# Patient Record
Sex: MALE | Race: WHITE | NOT HISPANIC OR LATINO | Employment: OTHER | URBAN - METROPOLITAN AREA
[De-identification: names, ages, dates, MRNs, and addresses within clinical notes are randomized per-mention and may not be internally consistent; named-entity substitution may affect disease eponyms.]

---

## 2017-01-10 ENCOUNTER — TELEPHONE (OUTPATIENT)
Dept: ORTHOPEDICS | Facility: CLINIC | Age: 77
End: 2017-01-10

## 2017-01-10 NOTE — TELEPHONE ENCOUNTER
----- Message from Pippa Ordonez sent at 1/10/2017  9:39 AM CST -----  Contact: self@ home   Pt is calling to verify his surgery time/ arrival.

## 2017-01-11 ENCOUNTER — SURGERY (OUTPATIENT)
Age: 77
End: 2017-01-11

## 2017-01-11 ENCOUNTER — ANESTHESIA (OUTPATIENT)
Dept: SURGERY | Facility: HOSPITAL | Age: 77
DRG: 470 | End: 2017-01-11
Payer: MEDICARE

## 2017-01-11 PROBLEM — M16.11 PRIMARY OSTEOARTHRITIS OF RIGHT HIP: Status: ACTIVE | Noted: 2017-01-11

## 2017-01-11 PROCEDURE — 27200688 HC TRAY, SPINAL-HYPER/ ISOBARIC: Performed by: ANESTHESIOLOGY

## 2017-01-11 PROCEDURE — D9220A PRA ANESTHESIA: Mod: CRNA,,, | Performed by: NURSE ANESTHETIST, CERTIFIED REGISTERED

## 2017-01-11 PROCEDURE — 25000003 PHARM REV CODE 250: Performed by: NURSE ANESTHETIST, CERTIFIED REGISTERED

## 2017-01-11 PROCEDURE — D9220A PRA ANESTHESIA: Mod: ANES,,, | Performed by: ANESTHESIOLOGY

## 2017-01-11 PROCEDURE — 27800517 HC TRAY,EPIDURAL-CONTINUOUS: Performed by: ANESTHESIOLOGY

## 2017-01-11 PROCEDURE — 27200710 HC EPIDURAL INFUSION PUMP SET: Performed by: ANESTHESIOLOGY

## 2017-01-11 PROCEDURE — 25000003 PHARM REV CODE 250: Performed by: NURSE PRACTITIONER

## 2017-01-11 PROCEDURE — 63600175 PHARM REV CODE 636 W HCPCS: Performed by: NURSE ANESTHETIST, CERTIFIED REGISTERED

## 2017-01-11 RX ORDER — LIDOCAINE HCL/PF 100 MG/5ML
SYRINGE (ML) INTRAVENOUS
Status: DISCONTINUED | OUTPATIENT
Start: 2017-01-11 | End: 2017-01-11

## 2017-01-11 RX ORDER — PHENYLEPHRINE HYDROCHLORIDE 10 MG/ML
INJECTION INTRAVENOUS
Status: DISCONTINUED | OUTPATIENT
Start: 2017-01-11 | End: 2017-01-11

## 2017-01-11 RX ORDER — FENTANYL CITRATE 50 UG/ML
INJECTION, SOLUTION INTRAMUSCULAR; INTRAVENOUS
Status: DISCONTINUED | OUTPATIENT
Start: 2017-01-11 | End: 2017-01-11

## 2017-01-11 RX ORDER — SODIUM CHLORIDE 9 MG/ML
INJECTION, SOLUTION INTRAVENOUS CONTINUOUS PRN
Status: DISCONTINUED | OUTPATIENT
Start: 2017-01-11 | End: 2017-01-11

## 2017-01-11 RX ORDER — DEXAMETHASONE SODIUM PHOSPHATE 4 MG/ML
INJECTION, SOLUTION INTRA-ARTICULAR; INTRALESIONAL; INTRAMUSCULAR; INTRAVENOUS; SOFT TISSUE
Status: DISCONTINUED | OUTPATIENT
Start: 2017-01-11 | End: 2017-01-11

## 2017-01-11 RX ORDER — MIDAZOLAM HYDROCHLORIDE 1 MG/ML
INJECTION, SOLUTION INTRAMUSCULAR; INTRAVENOUS
Status: DISCONTINUED | OUTPATIENT
Start: 2017-01-11 | End: 2017-01-11

## 2017-01-11 RX ORDER — PROPOFOL 10 MG/ML
VIAL (ML) INTRAVENOUS CONTINUOUS PRN
Status: DISCONTINUED | OUTPATIENT
Start: 2017-01-11 | End: 2017-01-11

## 2017-01-11 RX ORDER — KETAMINE HCL IN 0.9 % NACL 50 MG/5 ML
SYRINGE (ML) INTRAVENOUS
Status: DISCONTINUED | OUTPATIENT
Start: 2017-01-11 | End: 2017-01-11

## 2017-01-11 RX ADMIN — PROPOFOL 100 MCG/KG/MIN: 10 INJECTION, EMULSION INTRAVENOUS at 08:01

## 2017-01-11 RX ADMIN — SODIUM CHLORIDE, SODIUM ACETATE ANHYDROUS, SODIUM GLUCONATE, POTASSIUM CHLORIDE, AND MAGNESIUM CHLORIDE: 526; 222; 502; 37; 30 INJECTION, SOLUTION INTRAVENOUS at 09:01

## 2017-01-11 RX ADMIN — SODIUM CHLORIDE: 0.9 INJECTION, SOLUTION INTRAVENOUS at 07:01

## 2017-01-11 RX ADMIN — Medication 25 MG: at 08:01

## 2017-01-11 RX ADMIN — FENTANYL CITRATE 50 MCG: 50 INJECTION, SOLUTION INTRAMUSCULAR; INTRAVENOUS at 08:01

## 2017-01-11 RX ADMIN — DEXAMETHASONE SODIUM PHOSPHATE 8 MG: 4 INJECTION, SOLUTION INTRAMUSCULAR; INTRAVENOUS at 08:01

## 2017-01-11 RX ADMIN — TRANEXAMIC ACID 3000 MG: 100 INJECTION, SOLUTION INTRAVENOUS at 09:01

## 2017-01-11 RX ADMIN — EPHEDRINE SULFATE 5 MG: 50 INJECTION, SOLUTION INTRAMUSCULAR; INTRAVENOUS; SUBCUTANEOUS at 09:01

## 2017-01-11 RX ADMIN — EPHEDRINE SULFATE 10 MG: 50 INJECTION, SOLUTION INTRAMUSCULAR; INTRAVENOUS; SUBCUTANEOUS at 08:01

## 2017-01-11 RX ADMIN — MIDAZOLAM HYDROCHLORIDE 1 MG: 1 INJECTION, SOLUTION INTRAMUSCULAR; INTRAVENOUS at 07:01

## 2017-01-11 RX ADMIN — PHENYLEPHRINE HYDROCHLORIDE 200 MCG: 10 INJECTION INTRAVENOUS at 09:01

## 2017-01-11 RX ADMIN — Medication 2 G: at 08:01

## 2017-01-11 RX ADMIN — SODIUM CHLORIDE, SODIUM ACETATE ANHYDROUS, SODIUM GLUCONATE, POTASSIUM CHLORIDE, AND MAGNESIUM CHLORIDE: 526; 222; 502; 37; 30 INJECTION, SOLUTION INTRAVENOUS at 08:01

## 2017-01-11 RX ADMIN — EPHEDRINE SULFATE 25 MG: 50 INJECTION, SOLUTION INTRAMUSCULAR; INTRAVENOUS; SUBCUTANEOUS at 08:01

## 2017-01-11 RX ADMIN — PHENYLEPHRINE HYDROCHLORIDE 100 MCG: 10 INJECTION INTRAVENOUS at 09:01

## 2017-01-11 RX ADMIN — LIDOCAINE HYDROCHLORIDE 100 MG: 20 INJECTION, SOLUTION INTRAVENOUS at 08:01

## 2017-01-12 PROCEDURE — 01996 DLY HOSP MGMT EDRL RX ADMIN: CPT | Mod: GC,,, | Performed by: ANESTHESIOLOGY

## 2017-01-13 ENCOUNTER — TELEPHONE (OUTPATIENT)
Dept: ORTHOPEDICS | Facility: CLINIC | Age: 77
End: 2017-01-13

## 2017-01-13 NOTE — TELEPHONE ENCOUNTER
----- Message from Chelsea Patel LPN sent at 1/13/2017  1:51 PM CST -----  Contact: [facility] ([facility])      ----- Message -----     From: Singh Carmona     Sent: 1/13/2017  12:01 PM       To: Gar[facility] Londono Staff    [facility] called to request clarification on wound care orders. [phone]

## 2017-01-13 NOTE — TELEPHONE ENCOUNTER
Returned call to [facility]. Clarified orders for wound care. No need to change the dressing once it's been removed.

## 2017-01-16 ENCOUNTER — PATIENT OUTREACH (OUTPATIENT)
Dept: ADMINISTRATIVE | Facility: CLINIC | Age: 77
End: 2017-01-16
Payer: MEDICARE

## 2017-01-16 NOTE — PROGRESS NOTES
The patient does not have a scheduled HOSFU appointment within 7-14 days post hospital discharge date 1/12/17. Message sent to Physician staff to assist with HOSFU appointment scheduling.

## 2017-01-16 NOTE — PATIENT INSTRUCTIONS
Understanding Osteoarthritis of the Hip    A joint is a place where two bones meet. The hip is a ball-and-socket joint. It is formed where the ball at the top of the thighbone (femur) rests in the socket in the pelvic bone. The hip joint allows the leg to move freely in many directions.  Hip osteoarthritis is a condition where parts of the hip joint wear out. It can lead to pain, stiffness, and limited movement.   What is osteoarthritis?  All joints contain a smooth tissue called cartilage. Cartilage cushions the ends of bones, helping them glide against each other. Hip osteoarthritis occurs when cartilage in the hip joint begins to break down and wear away. The ball and socket bones may then become exposed and rub together. The cartilage may become rough and pitted and may begin to wear away. This prevents smooth movement of the joint and can lead to pain.  Causes of osteoarthritis of the hip  Causes can include:  · Wear and tear from normal use of the hip over time  · Overuse of the hip during sports or work activities  · Being overweight. This increases stress on the hip joint.  · Injury to the hip  · Infection of the hip joint  Symptoms of osteoarthritis of the hip  The main symptom of hip osteoarthritis is pain. The pain is most often felt in the groin area. It may also travel down the leg to the knee or to the back of the hip. The pain usually gets worse with activity, such as walking or climbing stairs. The pain may get better with rest. The joint may also be stiff first thing in the morning or after periods of sitting or lying down. Stiffness usually gets better with movement.  Treating osteoarthritis of the hip  Osteoarthritis is a long-term condition. Treatment usually focuses on managing symptoms. Treatment may include:  · Over-the-counter or prescription medicines taken by mouth to help relieve pain and swelling  · Injections of medicine into the joint to help relieve symptoms for a time  · A  weight-loss plan if you are overweight  · A plan of physical therapy and exercises to improve strength and flexibility around the joint  · Cold or heat packs help relieve pain and stiffness  · Assistive devices that help with movement, such as a cane or a walker  · Assistive devices that make activities of daily life easier, such as raised toilet seats or shower bars  If other treatments dont do enough to relieve severe symptoms, you may need surgery to replace the joint. This surgery replaces the hip joint with an artificial joint. It can help relieve pain and stiffness and improve function of the hip.     When to call your healthcare provider  Call your healthcare provider right away if you have any of these:  · Fever of 100.4°F (38°C) or higher, or as directed  · Symptoms that dont get better with prescribed medicines or get worse  · New symptoms   © 6194-9885 [employer]. [address] [place] [address], [place] [address]. All rights reserved. This information is not intended as a substitute for professional medical care. Always follow your healthcare professional's instructions.

## 2017-01-17 ENCOUNTER — TELEPHONE (OUTPATIENT)
Dept: ORTHOPEDICS | Facility: CLINIC | Age: 77
End: 2017-01-17

## 2017-01-17 NOTE — TELEPHONE ENCOUNTER
----- Message from Annel Garcia sent at 1/17/2017 11:27 AM CST -----  Contact: Pt can be reached at [phone]  Pt is calling to request appt that was accidentally cancelled for 1/20/17 if possible. Please contact pt for confirmation.      Thank you!

## 2017-01-17 NOTE — TELEPHONE ENCOUNTER
Spoke with pt and informed him that he did not need appt on 01/20, due to him having his post op appt on 01/26 with [facility].

## 2017-01-26 ENCOUNTER — HOSPITAL ENCOUNTER (OUTPATIENT)
Dept: RADIOLOGY | Facility: HOSPITAL | Age: 77
Discharge: HOME | End: 2017-01-26
Attending: NURSE PRACTITIONER
Payer: MEDICARE

## 2017-01-26 ENCOUNTER — OFFICE VISIT (OUTPATIENT)
Dept: ORTHOPEDICS | Facility: CLINIC | Age: 77
End: 2017-01-26
Payer: MEDICARE

## 2017-01-26 VITALS
WEIGHT: 214.94 LBS | TEMPERATURE: 97 F | BODY MASS INDEX: 32.58 KG/M2 | HEIGHT: 68 IN | HEART RATE: 70 BPM | SYSTOLIC BLOOD PRESSURE: 129 MMHG | DIASTOLIC BLOOD PRESSURE: 81 MMHG | RESPIRATION RATE: 19 BRPM

## 2017-01-26 DIAGNOSIS — Z96.641 S/P HIP REPLACEMENT, RIGHT: ICD-10-CM

## 2017-01-26 DIAGNOSIS — Z96.641 S/P HIP REPLACEMENT, RIGHT: Primary | ICD-10-CM

## 2017-01-26 PROCEDURE — 99024 POSTOP FOLLOW-UP VISIT: CPT | Mod: S$GLB,,, | Performed by: NURSE PRACTITIONER

## 2017-01-26 PROCEDURE — 99999 PR PBB SHADOW E&M-EST. PATIENT-LVL IV: CPT | Mod: PBBFAC,,, | Performed by: NURSE PRACTITIONER

## 2017-01-26 PROCEDURE — 73502 X-RAY EXAM HIP UNI 2-3 VIEWS: CPT | Mod: 26,RT,, | Performed by: RADIOLOGY

## 2017-01-26 PROCEDURE — 73502 X-RAY EXAM HIP UNI 2-3 VIEWS: CPT | Mod: TC,RT

## 2017-01-26 NOTE — PROGRESS NOTES
Jacksonborosunshine Camarillo presents for initial post-operative visit following a right total hip arthroplasty performed by Dr. Navarro on 1/11/2017.  Tolerating pain medication well.     Exam:  Patient is ambulating well with walker.   Incision is clean and dry without drainage or erythema.      Initial post-operative radiographs reviewed today revealing satisfactory position of the prosthesis.    A/P  2 weeks s/p right total hip replacement   - Patient advised to keep the incision clean and dry for the next 24 hours after which he may wash the area with antibacterial soap in the shower, but is advised not to submerge until the incision is completely healed.  - Continue aspirin for 1 month from surgery.  - Pain medication: no longer taking  - Follow up in 4 weeks with Dr. Navarro. Pt will call clinic immediately with problems/concerns.

## 2017-02-23 ENCOUNTER — OFFICE VISIT (OUTPATIENT)
Dept: ORTHOPEDICS | Facility: CLINIC | Age: 77
End: 2017-02-23
Payer: MEDICARE

## 2017-02-23 ENCOUNTER — HOSPITAL ENCOUNTER (OUTPATIENT)
Dept: RADIOLOGY | Facility: HOSPITAL | Age: 77
Discharge: HOME | End: 2017-02-23
Attending: ORTHOPAEDIC SURGERY
Payer: MEDICARE

## 2017-02-23 VITALS
WEIGHT: 212.19 LBS | RESPIRATION RATE: 18 BRPM | BODY MASS INDEX: 32.16 KG/M2 | HEART RATE: 69 BPM | TEMPERATURE: 97 F | SYSTOLIC BLOOD PRESSURE: 133 MMHG | DIASTOLIC BLOOD PRESSURE: 76 MMHG | HEIGHT: 68 IN

## 2017-02-23 DIAGNOSIS — M25.551 RIGHT HIP PAIN: ICD-10-CM

## 2017-02-23 DIAGNOSIS — M25.551 RIGHT HIP PAIN: Primary | ICD-10-CM

## 2017-02-23 PROCEDURE — 73502 X-RAY EXAM HIP UNI 2-3 VIEWS: CPT | Mod: TC,RT

## 2017-02-23 PROCEDURE — 73502 X-RAY EXAM HIP UNI 2-3 VIEWS: CPT | Mod: 26,RT,, | Performed by: RADIOLOGY

## 2017-02-23 PROCEDURE — 99999 PR PBB SHADOW E&M-EST. PATIENT-LVL III: CPT | Mod: PBBFAC,,, | Performed by: ORTHOPAEDIC SURGERY

## 2017-02-23 PROCEDURE — 99024 POSTOP FOLLOW-UP VISIT: CPT | Mod: S$GLB,,, | Performed by: ORTHOPAEDIC SURGERY

## 2017-02-23 RX ORDER — ASPIRIN 81 MG/1
81 TABLET ORAL DAILY
COMMUNITY
End: 2022-06-17 | Stop reason: HOSPADM

## 2017-02-23 NOTE — PROGRESS NOTES
Herber Camarillo presents for post-operative evaluation.  He is status-post  right Hip arthroplasty.  The wound is healing well with no signs of erythema or warmth.  There is no drainage.  No clinical signs or symptoms of infection are present.  .  ROM full.    Post-operative radiographs were obtained today and show satisfactory position of the prosthesis.    We will continue post-operative physical therapy.    Follow-up in 6 weeks.

## 2017-02-23 NOTE — MR AVS SNAPSHOT
[facility]  [address]  [facility] [address]  Phone: [phone]                  Herber Camarillo   2017 10:00 AM   Office Visit    Description:  Male : 1940   Provider:  Hany Navarro MD   Department:  [facility]           Reason for Visit     Post-op Evaluation           Diagnoses this Visit        Comments    Right hip pain    -  Primary            To Do List           Goals (5 Years of Data)     None      [facility]     [facility] -  Assistance  Registered nurses in the [facility] provide clinical advisement, health education, appointment booking, and other advisory services.  Call for this free service at [phone].             Medications           Message regarding Medications     Verify the changes and/or additions to your medication regime listed below are the same as discussed with your clinician today.  If any of these changes or additions are incorrect, please notify your healthcare provider.        STOP taking these medications     oxycodone-acetaminophen (PERCOCET)  mg per tablet Take 1 tablet by mouth every 4 (four) hours as needed.    oxycodone-acetaminophen (PERCOCET)  mg per tablet Take 1 tablet by mouth every 4 (four) hours as needed for Pain.    promethazine (PHENERGAN) 12.5 MG Tab Take 1 tablet (12.5 mg total) by mouth every 6 (six) hours as needed (Nausea).    docusate sodium (COLACE) 100 MG capsule Take 1 capsule (100 mg total) by mouth 2 (two) times daily as needed for Constipation.    diclofenac (VOLTAREN) 50 MG EC tablet Take 1 tablet (50 mg total) by mouth 2 (two) times daily as needed.           Verify that the below list of medications is an accurate representation of the medications you are currently taking.  If none reported, the list may be blank. If incorrect, please contact your healthcare provider. Carry this list with you in case of emergency.           Current  "Medications     allopurinol (ZYLOPRIM) 300 MG tablet Take 1 tablet (300 mg total) by mouth once daily.    aspirin (ECOTRIN) 81 MG EC tablet Take 81 mg by mouth once daily.    atorvastatin (LIPITOR) 20 MG tablet Take 1 tablet (20 mg total) by mouth once daily.    b complex vitamins tablet Take 1 tablet by mouth once daily.    metoprolol succinate (TOPROL-XL) 25 MG 24 hr tablet Take 1 tablet (25 mg total) by mouth once daily.    nitroGLYCERIN (NITROSTAT) 0.4 MG SL tablet Place 1 tablet (0.4 mg total) under the tongue every 5 (five) minutes as needed for Chest pain. ONE UNDER TONGUE FOR ANGINA MAY REPEAT IN 5 MIN IF IT STILL CONTINUES, CALL 911 AND TAKE AN ASPIRIN    omega-3 fatty acids-vitamin E (FISH OIL) 1,000 mg Cap Take by mouth.    naproxen sodium (ANAPROX) 220 MG tablet Take 220 mg by mouth 2 (two) times daily with meals.           Clinical Reference Information           Your Vitals Were     BP Pulse Temp Resp Height Weight    133/76 (BP Location: Right arm, Patient Position: Sitting, BP Method: Automatic) 69 97.1 °F (36.2 °C) (Oral) 18 5' 8" (1.727 m) 96.2 kg (212 lb 3.1 oz)    BMI                32.26 kg/m2          Blood Pressure          Most Recent Value    BP  133/76      Allergies as of 2/23/2017     Novocain [Procaine]      Immunizations Administered on Date of Encounter - 2/23/2017     None      Orders Placed During Today's Visit     Future Labs/Procedures Expected by Expires    X-Ray Hip 2 View Right  2/23/2017 2/23/2018      Language Assistance Services     ATTENTION: Language assistance services are available, free of charge. Please call [phone].      ATENCIÓN: Si habla bessiehetal, tiene a hackett disposición servicios gratuitos de asistencia lingüística. Llame al [phone].     GIGI Ý: N?u b?n nói Ti?ng Vi?t, có các d?ch v? h? tr? ngôn ng? mi?n phí dành cho b?n. G?i s? [phone].         [facility] complies with applicable Federal civil rights laws and does not discriminate on " the basis of race, color, national origin, age, disability, or sex.

## 2017-03-27 RX ORDER — ALLOPURINOL 300 MG/1
300 TABLET ORAL DAILY
Qty: 90 TABLET | Refills: 3 | Status: SHIPPED | OUTPATIENT
Start: 2017-03-27 | End: 2017-11-21 | Stop reason: SDUPTHER

## 2017-04-06 ENCOUNTER — OFFICE VISIT (OUTPATIENT)
Dept: ORTHOPEDICS | Facility: CLINIC | Age: 77
End: 2017-04-06
Payer: MEDICARE

## 2017-04-06 ENCOUNTER — HOSPITAL ENCOUNTER (OUTPATIENT)
Dept: RADIOLOGY | Facility: HOSPITAL | Age: 77
Discharge: HOME | End: 2017-04-06
Attending: ORTHOPAEDIC SURGERY
Payer: MEDICARE

## 2017-04-06 VITALS — HEIGHT: 68 IN | BODY MASS INDEX: 33.43 KG/M2 | WEIGHT: 220.56 LBS

## 2017-04-06 DIAGNOSIS — M25.551 RIGHT HIP PAIN: ICD-10-CM

## 2017-04-06 PROCEDURE — 73502 X-RAY EXAM HIP UNI 2-3 VIEWS: CPT | Mod: 26,RT,, | Performed by: RADIOLOGY

## 2017-04-06 PROCEDURE — 99999 PR PBB SHADOW E&M-EST. PATIENT-LVL II: CPT | Mod: PBBFAC,,, | Performed by: ORTHOPAEDIC SURGERY

## 2017-04-06 PROCEDURE — 73502 X-RAY EXAM HIP UNI 2-3 VIEWS: CPT | Mod: TC,RT

## 2017-04-06 PROCEDURE — 99024 POSTOP FOLLOW-UP VISIT: CPT | Mod: S$GLB,,, | Performed by: ORTHOPAEDIC SURGERY

## 2017-04-06 RX ORDER — AMOXICILLIN 500 MG/1
2000 TABLET, FILM COATED ORAL ONCE
Qty: 4 TABLET | Refills: 0 | Status: SHIPPED | OUTPATIENT
Start: 2017-04-06 | End: 2017-04-06

## 2017-04-06 NOTE — PROGRESS NOTES
Peters P Marquise presents for post-operative evaluation.  He is status-post  fritz.  The wound is healing well with no signs of erythema or warmth.  There is no drainage.  No clinical signs or symptoms of infection are present. .  ROM near normal. Getting better at tieing shoes.    Post-operative radiographs were obtained today and show satisfactory position of the prosthesis.    We will continue post-operative physical therapy.    Follow-up in 6 weeks.

## 2017-04-20 ENCOUNTER — OFFICE VISIT (OUTPATIENT)
Dept: OPTOMETRY | Facility: CLINIC | Age: 77
End: 2017-04-20
Payer: MEDICARE

## 2017-04-20 DIAGNOSIS — H02.834 DERMATOCHALASIS OF BOTH UPPER EYELIDS: ICD-10-CM

## 2017-04-20 DIAGNOSIS — H52.4 MYOPIA WITH PRESBYOPIA OF BOTH EYES: ICD-10-CM

## 2017-04-20 DIAGNOSIS — H52.13 MYOPIA WITH PRESBYOPIA OF BOTH EYES: ICD-10-CM

## 2017-04-20 DIAGNOSIS — H02.831 DERMATOCHALASIS OF BOTH UPPER EYELIDS: ICD-10-CM

## 2017-04-20 DIAGNOSIS — H25.13 NUCLEAR SCLEROSIS, BILATERAL: Primary | ICD-10-CM

## 2017-04-20 PROCEDURE — 92014 COMPRE OPH EXAM EST PT 1/>: CPT | Mod: S$GLB,,, | Performed by: OPTOMETRIST

## 2017-04-20 PROCEDURE — 99999 PR PBB SHADOW E&M-EST. PATIENT-LVL II: CPT | Mod: PBBFAC,,, | Performed by: OPTOMETRIST

## 2017-04-20 NOTE — PROGRESS NOTES
HPI     Last eye exam was 3/30/15 with Dr. Aguirre.  Patient states decrease in near vision with readers. Wanted to know if he   could use OTC readers vs rx readers. Didn't fill distance rx. Has trouble   keeping eyes open towards then end of the day-lids droopy.  Patient denies diplopia, headaches, flashes/floaters, and pain.         Last edited by Shayna Yanes on 4/20/2017 10:41 AM.     ROS     Positive for: Eyes    Negative for: Constitutional, Gastrointestinal, Neurological, Skin,   Genitourinary, Musculoskeletal, HENT, Endocrine, Cardiovascular,   Respiratory, Psychiatric, Allergic/Imm, Heme/Lymph    Last edited by Afia Aguirre, OD on 4/20/2017 11:52 AM. (History)        Assessment /Plan     For exam results, see Encounter Report.    Nuclear sclerosis, bilateral    Dermatochalasis of both upper eyelids    Myopia with presbyopia of both eyes            1.  Educated on cataracts and affects on vision.  Patient happy with vision.   2.  Patient reports lids bothersome towards the end of the day but does not want surgery.  Monitor.  3.  Educated pt ok to use +1.00 OTC readers.  Eye health normal OU.        RTC 1 year for routine exam.

## 2017-05-19 RX ORDER — METOPROLOL SUCCINATE 25 MG/1
25 TABLET, EXTENDED RELEASE ORAL DAILY
Qty: 90 TABLET | Refills: 3 | Status: SHIPPED | OUTPATIENT
Start: 2017-05-19 | End: 2018-10-05 | Stop reason: SDUPTHER

## 2017-05-23 ENCOUNTER — PATIENT MESSAGE (OUTPATIENT)
Dept: INTERNAL MEDICINE | Facility: CLINIC | Age: 77
End: 2017-05-23

## 2017-05-24 ENCOUNTER — TELEPHONE (OUTPATIENT)
Dept: INTERNAL MEDICINE | Facility: CLINIC | Age: 77
End: 2017-05-24

## 2017-05-25 ENCOUNTER — TELEPHONE (OUTPATIENT)
Dept: INTERNAL MEDICINE | Facility: CLINIC | Age: 77
End: 2017-05-25

## 2017-05-25 ENCOUNTER — PATIENT MESSAGE (OUTPATIENT)
Dept: INTERNAL MEDICINE | Facility: CLINIC | Age: 77
End: 2017-05-25

## 2017-06-02 ENCOUNTER — TELEPHONE (OUTPATIENT)
Dept: ORTHOPEDICS | Facility: CLINIC | Age: 77
End: 2017-06-02

## 2017-06-02 NOTE — TELEPHONE ENCOUNTER
Spoke with ShardaMarquise and informed him that since his sx was January he was passed the post op dian and that his appt that is scheduled on 6/26 is just a post sx f/u. Marquise verbalized understanding.

## 2017-06-02 NOTE — TELEPHONE ENCOUNTER
----- Message from Anna Jerome sent at 6/2/2017  1:26 PM CDT -----  Contact: self   Pt called to schedule an appt with the dr and he wanted to know if this would be a post op appt. His appt is scheduled for June 26th. Please call pt regarding this at [phone]

## 2017-06-26 ENCOUNTER — OFFICE VISIT (OUTPATIENT)
Dept: ORTHOPEDICS | Facility: CLINIC | Age: 77
End: 2017-06-26
Payer: MEDICARE

## 2017-06-26 VITALS — HEIGHT: 68 IN | WEIGHT: 213 LBS | BODY MASS INDEX: 32.28 KG/M2

## 2017-06-26 DIAGNOSIS — M25.551 BILATERAL HIP PAIN: Primary | ICD-10-CM

## 2017-06-26 DIAGNOSIS — M25.552 BILATERAL HIP PAIN: Primary | ICD-10-CM

## 2017-06-26 PROCEDURE — 99999 PR PBB SHADOW E&M-EST. PATIENT-LVL II: CPT | Mod: PBBFAC,,, | Performed by: ORTHOPAEDIC SURGERY

## 2017-06-26 PROCEDURE — 1159F MED LIST DOCD IN RCRD: CPT | Mod: S$GLB,,, | Performed by: ORTHOPAEDIC SURGERY

## 2017-06-26 PROCEDURE — 1125F AMNT PAIN NOTED PAIN PRSNT: CPT | Mod: S$GLB,,, | Performed by: ORTHOPAEDIC SURGERY

## 2017-06-26 PROCEDURE — 99212 OFFICE O/P EST SF 10 MIN: CPT | Mod: S$GLB,,, | Performed by: ORTHOPAEDIC SURGERY

## 2017-06-26 NOTE — PROGRESS NOTES
CC:   3 months out frpm fritz  HPI:  Doing well. Minimal pain. 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     PAST SURGICAL HISTORY:   Past Surgical History:   Procedure Laterality Date    CARDIAC CATHETERIZATION  01/2000    CHOLECYSTECTOMY      HERNIA REPAIR      HIP SURGERY Right 01/11/2017    THR    PROSTATE SURGERY      SHOULDER OPEN ROTATOR CUFF REPAIR      TONSILLECTOMY       FAMILY HISTORY:   Family History   Problem Relation Age of Onset    Heart disease Father     Cancer Sister      Lung    Heart disease Brother     Heart disease Brother     Heart disease Mother     Amblyopia Neg Hx     Blindness Neg Hx     Cataracts Neg Hx     Diabetes Neg Hx     Glaucoma Neg Hx     Hypertension Neg Hx     Macular degeneration Neg Hx     Retinal detachment Neg Hx     Strabismus Neg Hx     Stroke Neg Hx     Thyroid disease Neg Hx     Melanoma Neg Hx     Psoriasis Neg Hx     Lupus Neg Hx     Eczema Neg Hx     Acne Neg Hx     Asthma Neg Hx     Emphysema Neg Hx      SOCIAL HISTORY:   Social History     Social History    Marital status:      Spouse name: N/A    Number of children: N/A    Years of education: N/A     Occupational History    Retired Retired     Social History Main Topics    Smoking status: Never Smoker    Smokeless tobacco: Never Used    Alcohol use No    Drug use: No    Sexual activity: Not on file     Other Topics Concern    Not on file     Social History Narrative    Originally from [place]; 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 Herber Camarillo       MEDICATIONS:   Current Outpatient Prescriptions:     allopurinol (ZYLOPRIM) 300 MG tablet, Take 1 tablet (300 mg total) by mouth once daily., Disp: 90 tablet, Rfl: 3    aspirin (ECOTRIN) 81 MG EC tablet, Take 81 mg by mouth once daily., Disp: , Rfl:     atorvastatin (LIPITOR) 20 MG tablet, Take 1 tablet (20 mg total) by mouth once daily., Disp: 90 tablet, Rfl: 3    b complex vitamins tablet, Take 1 tablet by mouth once daily., Disp: , Rfl:     metoprolol succinate (TOPROL-XL) 25 MG 24 hr tablet, Take 1 tablet (25 mg total) by mouth once daily., Disp: 90 tablet, Rfl: 3    naproxen sodium (ANAPROX) 220 MG tablet, Take 220 mg by mouth 2 (two) times daily with meals., Disp: , Rfl:     nitroGLYCERIN (NITROSTAT) 0.4 MG SL tablet, Place 1 tablet (0.4 mg total) under the tongue every 5 (five) minutes as needed for Chest pain. ONE UNDER TONGUE FOR ANGINA MAY REPEAT IN 5 MIN IF IT STILL CONTINUES, CALL 911 AND TAKE AN ASPIRIN, Disp: 25 tablet, Rfl: 12    omega-3 fatty acids-vitamin E (FISH OIL) 1,000 mg Cap, Take by mouth., Disp: , Rfl:   ALLERGIES:   Review of patient's allergies indicates:   Allergen Reactions    Novocain [procaine] Other (See Comments)     Other reaction(s): shortnes of breath       VITAL SIGNS: Ht 5' 8" (1.727 m)   Wt 96.6 kg (213 lb)   BMI 32.39 kg/m²      Review of Systems   Constitution: Negative. Negative for chills, fever and night sweats.   HENT: Negative for congestion and headaches.    Eyes: Negative for blurred vision, left vision loss and right vision loss.   Cardiovascular: Negative for chest pain and syncope.   Respiratory: Negative for cough and shortness of breath.    Endocrine: Negative for polydipsia, polyphagia and polyuria.   Hematologic/Lymphatic: Negative for bleeding problem. Does not bruise/bleed easily.   Skin: Negative for dry skin, itching and rash.   Musculoskeletal: Negative for falls and muscle weakness. "   Gastrointestinal: Negative for abdominal pain and bowel incontinence.   Genitourinary: Negative for bladder incontinence and nocturia.   Neurological: Negative for disturbances in coordination, loss of balance and seizures.   Psychiatric/Behavioral: Negative for depression. The patient does not have insomnia.    Allergic/Immunologic: Negative for hives and persistent infections.       Physical Exam   Constitutional: Oriented to person, place, and time. Appears well-developed and well-nourished.   HENT:   Head: Normocephalic and atraumatic.   Nose: Nose normal.   Eyes: No scleral icterus.   Neck: Normal range of motion. Neck supple.   Cardiovascular: Normal rate and regular rhythm.    Pulses:       Radial pulses are 2+ on the right side, and 2+ on the left side.   Pulmonary/Chest: Effort normal and breath sounds normal.   Abdominal: Soft.   Neurological: Alert and oriented to person, place, and time.   Skin: Skin is warm.   Psychiatric: Normal mood and affect.     Incisions c/d/i  nvi    Xrays:  Of bilateral hips ordered and reviewed and my interpretation is as follows: He has a well-placed right-sided total hip replacement.  Signs of loosening.  Assessment:  No diagnosis found.    Plan:   continue weightbearing as tolerated.  Follow-up in 6 months.       No Follow-up on file.

## 2017-08-22 RX ORDER — ATORVASTATIN CALCIUM 20 MG/1
20 TABLET, FILM COATED ORAL DAILY
Qty: 90 TABLET | Refills: 3 | Status: SHIPPED | OUTPATIENT
Start: 2017-08-22 | End: 2018-05-15 | Stop reason: SDUPTHER

## 2017-09-15 ENCOUNTER — PATIENT MESSAGE (OUTPATIENT)
Dept: INTERNAL MEDICINE | Facility: CLINIC | Age: 77
End: 2017-09-15

## 2017-09-15 ENCOUNTER — OFFICE VISIT (OUTPATIENT)
Dept: CARDIOLOGY | Facility: CLINIC | Age: 77
End: 2017-09-15
Payer: MEDICARE

## 2017-09-15 VITALS
SYSTOLIC BLOOD PRESSURE: 144 MMHG | BODY MASS INDEX: 33.31 KG/M2 | HEIGHT: 68 IN | DIASTOLIC BLOOD PRESSURE: 71 MMHG | HEART RATE: 59 BPM | WEIGHT: 219.81 LBS

## 2017-09-15 DIAGNOSIS — Z00.00 PHYSICAL EXAM, ANNUAL: Primary | ICD-10-CM

## 2017-09-15 DIAGNOSIS — C61 PROSTATE CANCER: ICD-10-CM

## 2017-09-15 DIAGNOSIS — I25.10 CORONARY ARTERY DISEASE INVOLVING NATIVE CORONARY ARTERY OF NATIVE HEART WITHOUT ANGINA PECTORIS: Primary | ICD-10-CM

## 2017-09-15 DIAGNOSIS — R53.83 FATIGUE, UNSPECIFIED TYPE: ICD-10-CM

## 2017-09-15 DIAGNOSIS — E55.9 VITAMIN D DEFICIENCY DISEASE: ICD-10-CM

## 2017-09-15 DIAGNOSIS — E66.09 NON MORBID OBESITY DUE TO EXCESS CALORIES: ICD-10-CM

## 2017-09-15 DIAGNOSIS — E78.2 MIXED HYPERLIPIDEMIA: ICD-10-CM

## 2017-09-15 DIAGNOSIS — E79.89 URIC ACID CRYSTALLOPATHY: ICD-10-CM

## 2017-09-15 DIAGNOSIS — I70.0 ATHEROSCLEROSIS OF AORTA: ICD-10-CM

## 2017-09-15 DIAGNOSIS — R73.02 GLUCOSE INTOLERANCE (IMPAIRED GLUCOSE TOLERANCE): ICD-10-CM

## 2017-09-15 DIAGNOSIS — Z12.5 ENCOUNTER FOR SCREENING FOR MALIGNANT NEOPLASM OF PROSTATE: ICD-10-CM

## 2017-09-15 DIAGNOSIS — I51.89 DIASTOLIC DYSFUNCTION: ICD-10-CM

## 2017-09-15 PROCEDURE — 99499 UNLISTED E&M SERVICE: CPT | Mod: S$GLB,,, | Performed by: INTERNAL MEDICINE

## 2017-09-15 PROCEDURE — 99999 PR PBB SHADOW E&M-EST. PATIENT-LVL III: CPT | Mod: PBBFAC,,, | Performed by: INTERNAL MEDICINE

## 2017-09-15 PROCEDURE — 1126F AMNT PAIN NOTED NONE PRSNT: CPT | Mod: S$GLB,,, | Performed by: INTERNAL MEDICINE

## 2017-09-15 PROCEDURE — 3008F BODY MASS INDEX DOCD: CPT | Mod: S$GLB,,, | Performed by: INTERNAL MEDICINE

## 2017-09-15 PROCEDURE — 1159F MED LIST DOCD IN RCRD: CPT | Mod: S$GLB,,, | Performed by: INTERNAL MEDICINE

## 2017-09-15 PROCEDURE — 99214 OFFICE O/P EST MOD 30 MIN: CPT | Mod: S$GLB,,, | Performed by: INTERNAL MEDICINE

## 2017-09-15 NOTE — PROGRESS NOTES
Subjective:    Patient ID:  Herber Camarillo is a 77 y.o. male who presents for follow-up of Atherosclerosis of aorta and Coronary Artery Disease      HPI     The patient is a 77 year male followed by Dr Morin with CAD post NSTEME?PCI to LAD 2000. He is post R THR las January. He is now doing well and has no chest or WILKINS.  Lab Results   Component Value Date     01/11/2017    K 4.5 01/11/2017     01/11/2017    CO2 26 01/11/2017    BUN 11 01/11/2017    CREATININE 0.9 01/11/2017     01/11/2017    HGBA1C 5.1 12/16/2016    AST 28 12/16/2016    ALT 33 12/16/2016    ALBUMIN 3.8 12/16/2016    PROT 7.2 12/16/2016    BILITOT 1.2 (H) 12/16/2016    WBC 6.89 12/16/2016    WBC 6.89 12/16/2016    HGB 17.0 12/16/2016    HGB 17.0 12/16/2016    HCT 51.2 12/16/2016    HCT 51.2 12/16/2016    MCV 94 12/16/2016    MCV 94 12/16/2016     12/16/2016     12/16/2016    INR 1.0 12/16/2016    PSA 0.04 12/16/2016    TSH 1.987 12/16/2016         Lab Results   Component Value Date    CHOL 122 12/16/2016    HDL 39 (L) 12/16/2016    TRIG 118 12/16/2016       Lab Results   Component Value Date    LDLCALC 59.4 (L) 12/16/2016       Past Medical History:   Diagnosis Date    Arthritis     CAD (coronary artery disease) 10/30/2012    Cataract     Fever blister     Glucose intolerance (impaired glucose tolerance) 7/24/2013    Heart attack     Hyperlipidemia 10/30/2012    Joint pain     Keloid cicatrix     Lung nodule, solitary 4/30/2015    Non morbid obesity due to excess calories 10/20/2015    Obesity     Overweight(278.02) 9/5/2013    Prostate cancer     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       Current Outpatient Prescriptions:     allopurinol (ZYLOPRIM) 300 MG tablet, Take 1 tablet (300 mg total) by mouth once daily., Disp: 90 tablet, Rfl: 3    aspirin (ECOTRIN) 81 MG EC tablet, Take 81 mg by mouth once daily., Disp: , Rfl:     atorvastatin (LIPITOR) 20 MG tablet, Take 1 tablet (20 mg total) by mouth once  daily., Disp: 90 tablet, Rfl: 3    b complex vitamins tablet, Take 1 tablet by mouth once daily., Disp: , Rfl:     metoprolol succinate (TOPROL-XL) 25 MG 24 hr tablet, Take 1 tablet (25 mg total) by mouth once daily., Disp: 90 tablet, Rfl: 3    nitroGLYCERIN (NITROSTAT) 0.4 MG SL tablet, Place 1 tablet (0.4 mg total) under the tongue every 5 (five) minutes as needed for Chest pain. ONE UNDER TONGUE FOR ANGINA MAY REPEAT IN 5 MIN IF IT STILL CONTINUES, CALL 911 AND TAKE AN ASPIRIN, Disp: 25 tablet, Rfl: 12    omega-3 fatty acids-vitamin E (FISH OIL) 1,000 mg Cap, Take by mouth., Disp: , Rfl:         Review of Systems   Constitution: Negative for decreased appetite, diaphoresis, fever, weakness, malaise/fatigue, weight gain and weight loss.   HENT: Negative for congestion, ear discharge, ear pain and nosebleeds.    Eyes: Negative for blurred vision, double vision and visual disturbance.   Cardiovascular: Negative for chest pain, claudication, cyanosis, dyspnea on exertion, irregular heartbeat, leg swelling, near-syncope, orthopnea, palpitations, paroxysmal nocturnal dyspnea and syncope.   Respiratory: Negative for cough, hemoptysis, shortness of breath, sleep disturbances due to breathing, snoring, sputum production and wheezing.    Endocrine: Negative for polydipsia, polyphagia and polyuria.   Hematologic/Lymphatic: Negative for adenopathy and bleeding problem. Does not bruise/bleed easily.   Skin: Negative for color change, nail changes, poor wound healing and rash.   Musculoskeletal: Negative for muscle cramps and muscle weakness.   Gastrointestinal: Negative for abdominal pain, anorexia, change in bowel habit, hematochezia, nausea and vomiting.   Genitourinary: Negative for dysuria, frequency and hematuria.   Neurological: Negative for brief paralysis, difficulty with concentration, excessive daytime sleepiness, dizziness, focal weakness, headaches, light-headedness, seizures and vertigo.  "  Psychiatric/Behavioral: Negative for altered mental status and depression.   Allergic/Immunologic: Negative for persistent infections.        Objective:BP (!) 144/71 (BP Location: Left arm, Patient Position: Sitting, BP Method: X-Large (Automatic))   Pulse (!) 59   Ht 5' 8" (1.727 m)   Wt 99.7 kg (219 lb 12.8 oz)   BMI 33.42 kg/m²           Physical Exam   Constitutional: He is oriented to person, place, and time. He appears well-developed and well-nourished.   obese   HENT:   Head: Normocephalic.   Right Ear: External ear normal.   Left Ear: External ear normal.   Nose: Nose normal.   Inspection of lips, teeth and gums normal   Eyes: EOM are normal. Pupils are equal, round, and reactive to light. No scleral icterus.   Neck: Normal range of motion. Neck supple. No JVD present. No tracheal deviation present. No thyromegaly present.   Cardiovascular: Normal rate, regular rhythm and intact distal pulses.  Exam reveals no gallop and no friction rub.    No murmur heard.  Pulses:       Dorsalis pedis pulses are 2+ on the right side, and 2+ on the left side.        Posterior tibial pulses are 2+ on the right side, and 2+ on the left side.   Pulmonary/Chest: Effort normal and breath sounds normal.   Abdominal: Bowel sounds are normal. He exhibits no distension. There is no hepatosplenomegaly. There is no tenderness. There is no guarding.   Musculoskeletal: Normal range of motion. He exhibits no edema or tenderness.   Lymphadenopathy:   Palpation of neck and groin lymph nodes normal   Neurological: He is alert and oriented to person, place, and time. No cranial nerve deficit. He exhibits normal muscle tone. Coordination normal.   Skin: Skin is dry.   Palpation of skin normal   Psychiatric: His behavior is normal. Judgment and thought content normal.         Assessment:       1. Coronary artery disease involving native coronary artery of native heart without angina pectoris    2. Diastolic dysfunction    3. Mixed " hyperlipidemia    4. Atherosclerosis of aorta: see CT scan 7/15    5. Non morbid obesity due to excess calories         Plan:       Herber was seen today for atherosclerosis of aorta and coronary artery disease.    Diagnoses and all orders for this visit:    Coronary artery disease involving native coronary artery of native heart without angina pectoris  -     CBC auto differential; Future; Expected date: 09/15/2018  -     Lipid panel; Future; Expected date: 09/15/2018  -     Comprehensive metabolic panel; Future; Expected date: 09/15/2018    Diastolic dysfunction    Mixed hyperlipidemia  -     Lipid panel; Future; Expected date: 09/15/2018    Atherosclerosis of aorta: see CT scan 7/15    Non morbid obesity due to excess calories  -     Comprehensive metabolic panel; Future; Expected date: 09/15/2018

## 2017-11-21 RX ORDER — ALLOPURINOL 300 MG/1
TABLET ORAL
Qty: 90 TABLET | Refills: 3 | Status: SHIPPED | OUTPATIENT
Start: 2017-11-21 | End: 2018-09-14 | Stop reason: SDUPTHER

## 2017-12-04 ENCOUNTER — PATIENT MESSAGE (OUTPATIENT)
Dept: INTERNAL MEDICINE | Facility: CLINIC | Age: 77
End: 2017-12-04

## 2017-12-04 ENCOUNTER — LAB VISIT (OUTPATIENT)
Dept: LAB | Facility: HOSPITAL | Age: 77
End: 2017-12-04
Attending: INTERNAL MEDICINE
Payer: MEDICARE

## 2017-12-04 DIAGNOSIS — R53.83 FATIGUE, UNSPECIFIED TYPE: ICD-10-CM

## 2017-12-04 DIAGNOSIS — E55.9 VITAMIN D DEFICIENCY DISEASE: ICD-10-CM

## 2017-12-04 DIAGNOSIS — E78.2 MIXED HYPERLIPIDEMIA: ICD-10-CM

## 2017-12-04 DIAGNOSIS — Z12.5 ENCOUNTER FOR SCREENING FOR MALIGNANT NEOPLASM OF PROSTATE: ICD-10-CM

## 2017-12-04 DIAGNOSIS — R73.02 GLUCOSE INTOLERANCE (IMPAIRED GLUCOSE TOLERANCE): ICD-10-CM

## 2017-12-04 DIAGNOSIS — E79.89 URIC ACID CRYSTALLOPATHY: ICD-10-CM

## 2017-12-04 DIAGNOSIS — C61 PROSTATE CANCER: ICD-10-CM

## 2017-12-04 LAB
25(OH)D3+25(OH)D2 SERPL-MCNC: 24 NG/ML
ALBUMIN SERPL BCP-MCNC: 3.5 G/DL
ALP SERPL-CCNC: 94 U/L
ALT SERPL W/O P-5'-P-CCNC: 49 U/L
ANION GAP SERPL CALC-SCNC: 5 MMOL/L
AST SERPL-CCNC: 36 U/L
BASOPHILS # BLD AUTO: 0.05 K/UL
BASOPHILS NFR BLD: 0.7 %
BILIRUB SERPL-MCNC: 0.9 MG/DL
BUN SERPL-MCNC: 16 MG/DL
CALCIUM SERPL-MCNC: 9 MG/DL
CHLORIDE SERPL-SCNC: 106 MMOL/L
CHOLEST SERPL-MCNC: 137 MG/DL
CHOLEST/HDLC SERPL: 3.8 {RATIO}
CO2 SERPL-SCNC: 28 MMOL/L
COMPLEXED PSA SERPL-MCNC: 0.03 NG/ML
CREAT SERPL-MCNC: 1 MG/DL
DIFFERENTIAL METHOD: ABNORMAL
EOSINOPHIL # BLD AUTO: 0.1 K/UL
EOSINOPHIL NFR BLD: 1.9 %
ERYTHROCYTE [DISTWIDTH] IN BLOOD BY AUTOMATED COUNT: 12.2 %
EST. GFR  (AFRICAN AMERICAN): >60 ML/MIN/1.73 M^2
EST. GFR  (NON AFRICAN AMERICAN): >60 ML/MIN/1.73 M^2
ESTIMATED AVG GLUCOSE: 94 MG/DL
GLUCOSE SERPL-MCNC: 99 MG/DL
HBA1C MFR BLD HPLC: 4.9 %
HCT VFR BLD AUTO: 48.7 %
HDLC SERPL-MCNC: 36 MG/DL
HDLC SERPL: 26.3 %
HGB BLD-MCNC: 16.7 G/DL
IMM GRANULOCYTES # BLD AUTO: 0.04 K/UL
IMM GRANULOCYTES NFR BLD AUTO: 0.5 %
LDLC SERPL CALC-MCNC: 74.6 MG/DL
LYMPHOCYTES # BLD AUTO: 2.3 K/UL
LYMPHOCYTES NFR BLD: 30.3 %
MCH RBC QN AUTO: 31.8 PG
MCHC RBC AUTO-ENTMCNC: 34.3 G/DL
MCV RBC AUTO: 93 FL
MONOCYTES # BLD AUTO: 0.8 K/UL
MONOCYTES NFR BLD: 10.9 %
NEUTROPHILS # BLD AUTO: 4.2 K/UL
NEUTROPHILS NFR BLD: 55.7 %
NONHDLC SERPL-MCNC: 101 MG/DL
NRBC BLD-RTO: 0 /100 WBC
PLATELET # BLD AUTO: 207 K/UL
PMV BLD AUTO: 10.7 FL
POTASSIUM SERPL-SCNC: 4.3 MMOL/L
PROT SERPL-MCNC: 6.7 G/DL
RBC # BLD AUTO: 5.25 M/UL
SODIUM SERPL-SCNC: 139 MMOL/L
TRIGL SERPL-MCNC: 132 MG/DL
URATE SERPL-MCNC: 6.3 MG/DL
WBC # BLD AUTO: 7.55 K/UL

## 2017-12-04 PROCEDURE — 80053 COMPREHEN METABOLIC PANEL: CPT

## 2017-12-04 PROCEDURE — 82306 VITAMIN D 25 HYDROXY: CPT

## 2017-12-04 PROCEDURE — 80061 LIPID PANEL: CPT

## 2017-12-04 PROCEDURE — 36415 COLL VENOUS BLD VENIPUNCTURE: CPT

## 2017-12-04 PROCEDURE — 84153 ASSAY OF PSA TOTAL: CPT

## 2017-12-04 PROCEDURE — 84550 ASSAY OF BLOOD/URIC ACID: CPT

## 2017-12-04 PROCEDURE — 83036 HEMOGLOBIN GLYCOSYLATED A1C: CPT

## 2017-12-04 PROCEDURE — 85025 COMPLETE CBC W/AUTO DIFF WBC: CPT

## 2017-12-11 ENCOUNTER — PATIENT MESSAGE (OUTPATIENT)
Dept: INTERNAL MEDICINE | Facility: CLINIC | Age: 77
End: 2017-12-11

## 2017-12-11 ENCOUNTER — HOSPITAL ENCOUNTER (OUTPATIENT)
Dept: RADIOLOGY | Facility: HOSPITAL | Age: 77
Discharge: HOME | End: 2017-12-11
Attending: INTERNAL MEDICINE
Payer: MEDICARE

## 2017-12-11 ENCOUNTER — OFFICE VISIT (OUTPATIENT)
Dept: INTERNAL MEDICINE | Facility: CLINIC | Age: 77
End: 2017-12-11
Payer: MEDICARE

## 2017-12-11 VITALS
HEART RATE: 62 BPM | SYSTOLIC BLOOD PRESSURE: 125 MMHG | HEIGHT: 68 IN | BODY MASS INDEX: 33.58 KG/M2 | DIASTOLIC BLOOD PRESSURE: 70 MMHG | OXYGEN SATURATION: 97 % | WEIGHT: 221.56 LBS

## 2017-12-11 DIAGNOSIS — E78.2 MIXED HYPERLIPIDEMIA: ICD-10-CM

## 2017-12-11 DIAGNOSIS — R10.9 RIGHT FLANK PAIN: ICD-10-CM

## 2017-12-11 DIAGNOSIS — M16.11 PRIMARY OSTEOARTHRITIS OF RIGHT HIP: ICD-10-CM

## 2017-12-11 DIAGNOSIS — I70.0 ATHEROSCLEROSIS OF AORTA: ICD-10-CM

## 2017-12-11 DIAGNOSIS — R91.1 LUNG NODULE, SOLITARY: ICD-10-CM

## 2017-12-11 DIAGNOSIS — R74.8 ELEVATED LIVER ENZYMES: ICD-10-CM

## 2017-12-11 DIAGNOSIS — R07.81 PLEURITIC CHEST PAIN: ICD-10-CM

## 2017-12-11 DIAGNOSIS — E55.9 VITAMIN D DEFICIENCY DISEASE: ICD-10-CM

## 2017-12-11 DIAGNOSIS — C61 PROSTATE CANCER: ICD-10-CM

## 2017-12-11 DIAGNOSIS — Z00.00 ANNUAL PHYSICAL EXAM: Primary | ICD-10-CM

## 2017-12-11 DIAGNOSIS — I25.10 CORONARY ARTERY DISEASE INVOLVING NATIVE CORONARY ARTERY OF NATIVE HEART WITHOUT ANGINA PECTORIS: ICD-10-CM

## 2017-12-11 PROCEDURE — 99397 PER PM REEVAL EST PAT 65+ YR: CPT | Mod: S$GLB,,, | Performed by: INTERNAL MEDICINE

## 2017-12-11 PROCEDURE — 99499 UNLISTED E&M SERVICE: CPT | Mod: S$GLB,,, | Performed by: INTERNAL MEDICINE

## 2017-12-11 PROCEDURE — 71100 X-RAY EXAM RIBS UNI 2 VIEWS: CPT | Mod: 26,,, | Performed by: RADIOLOGY

## 2017-12-11 PROCEDURE — 99999 PR PBB SHADOW E&M-EST. PATIENT-LVL V: CPT | Mod: PBBFAC,,, | Performed by: INTERNAL MEDICINE

## 2017-12-11 PROCEDURE — 71100 X-RAY EXAM RIBS UNI 2 VIEWS: CPT | Mod: TC

## 2017-12-11 RX ORDER — VIT C/E/ZN/COPPR/LUTEIN/ZEAXAN 250MG-90MG
2000 CAPSULE ORAL DAILY
Qty: 60 CAPSULE | Refills: 12 | Status: SHIPPED | OUTPATIENT
Start: 2017-12-11 | End: 2018-06-26

## 2017-12-11 NOTE — PROGRESS NOTES
Subjective:       Patient ID: Herber Camarillo is a 77 y.o. male.    Chief Complaint: Annual Exam    Annual exam    Here with his wife.     Got a flu shot in November.  Shingle vaccines reviewed.     Medical problems have been stable.     Recall he has a history of prostate cancer. He was discharged from the urology clinic in 2011.     Lung nodule discussed, no need for follow up- stable and never smoked.   He was seen in pulmonary in 2015 and told no further follow-up needed.  He denies any symptoms whatsoever in terms of cough, wheezing, hemoptysis or shortness of breath.    Some pain in the hip- trochanteric bursitis R.  Has had some PT  times, slightly better.  Had a THR.  Doing well.    Chronic discomfort very irregularly in the right side, margin of lower rib.  No flank pain.  No hematuria.  Occasional bloating.  Does not have a gallbladder.  No fever, chills, sweats, cough, wheezing, hemoptysis or unexplained weight loss.  Has had this for almost 20 years and numerous x-rays have been negative or normal.  Reviewed.  He does not want to pursue another follow-up CT scan for lung nodule but does agree to do some plain x-rays.      Review of Systems   Constitutional: Negative for activity change and unexpected weight change.   HENT: Negative for hearing loss, rhinorrhea and trouble swallowing.    Eyes: Negative for discharge and visual disturbance.   Respiratory: Negative for chest tightness and wheezing.         Some slight pleuritic pain x years R side   Cardiovascular: Positive for chest pain. Negative for palpitations.        See above  Chest wall sx above R posterior ribs- infrequent   Gastrointestinal: Negative for blood in stool, constipation, diarrhea and vomiting.   Endocrine: Negative for polydipsia and polyuria.   Genitourinary: Negative for difficulty urinating, hematuria and urgency.   Musculoskeletal: Positive for arthralgias and joint swelling. Negative for neck pain.        Hip pain better since  surgery   Neurological: Negative for weakness and headaches.   Psychiatric/Behavioral: Negative for confusion and dysphoric mood.       Objective:      Physical Exam   Constitutional: He is oriented to person, place, and time. He appears well-developed and well-nourished.   HENT:   Head: Normocephalic and atraumatic.   Right Ear: External ear normal.   Left Ear: External ear normal.   Eyes: Conjunctivae and EOM are normal.   Neck: Normal range of motion. Neck supple. No thyromegaly present.   Cardiovascular: Normal rate and regular rhythm.    No murmur heard.  Pulmonary/Chest: Effort normal and breath sounds normal. No respiratory distress. He has no wheezes.   Abdominal: Soft. He exhibits no distension. There is no tenderness.   No flank pain  No rash  No masses   Musculoskeletal: He exhibits no edema or tenderness.   Lymphadenopathy:     He has no cervical adenopathy.   Neurological: He is alert and oriented to person, place, and time. No cranial nerve deficit.   Skin: Skin is warm and dry.   Psychiatric: He has a normal mood and affect. His behavior is normal.       Assessment:       1. Annual physical exam    2. Atherosclerosis of aorta: see CT scan 7/15    3. Lung nodule, solitary .7 cm 7/15; stable 12/16    4. Coronary artery disease involving native coronary artery of native heart without angina pectoris    5. Mixed hyperlipidemia    6. Prostate cancer    7. Primary osteoarthritis of right hip    8. Vitamin D deficiency disease    9. Elevated liver enzymes    10. Pleuritic chest pain    11. Right flank pain        Plan:         Annual physical exam    Atherosclerosis of aorta: see CT scan 7/15: Continue regimen    Lung nodule, solitary .7 cm 7/15; stable 12/16  -     Cancel: CT Chest Without Contrast; Future; Expected date: 12/11/2017- he declines    Coronary artery disease involving native coronary artery of native heart without angina pectoris: Keep cardiology follow-up    Mixed hyperlipidemia  -     Lipid  panel; Future; Expected date: 06/13/2018  -     Comprehensive metabolic panel; Future; Expected date: 06/13/2018    Prostate cancer: PSA stable, will monitor.  Discharge from urology    Primary osteoarthritis of right hip  -     Ambulatory referral to Orthopedics    Vitamin D deficiency disease: Over-the-counter vitamin D 2000 units daily  -     Vitamin D; Future; Expected date: 06/09/2018    Elevated liver enzymes: Minimal, may be fatty liver.  Ultrasound and review.  Repeat labs in 6 months.  Weight loss recommendations discussed    Pleuritic chest pain  -     X-Ray Ribs 2 View Right  -     X-Ray Chest PA And Lateral; Future; Expected date: 12/11/2017    Right flank pain  -     US Abdomen Complete; Future; Expected date: 12/11/2017    Other orders  -     cholecalciferol, vitamin D3, 1,000 unit capsule; Take 2 capsules (2,000 Units total) by mouth once daily.  Dispense: 60 capsule; Refill: 12    I will review all studies and determine further tx depending on findings

## 2017-12-11 NOTE — PATIENT INSTRUCTIONS
Prevention Guidelines, Men Ages 65 and Older  Screening tests and vaccines are an important part of managing your health. Health counseling is essential, too. Below are guidelines for these, for men ages 65 and older. Talk with your healthcare provider to make sure youre up-to-date on what you need.  Screening Who needs it How often   Abdominal aortic aneurysm Men ages 65 to 75 who have ever smoked 1 ultrasound   Alcohol misuse All men in this age group At routine exams   Blood pressure All men in this age group Every 2 years if your blood pressure is less than 120/80 mm Hg; yearly if your systolic blood pressure is 120 to 139 mm Hg, or your diastolic blood pressure reading is 80 to 89 mm Hg   Colorectal cancer All men in this age group Flexible sigmoidoscopy every 5 years, or colonoscopy every 10 years, or double-contrast barium enema every 5 years; yearly fecal occult blood test or fecal immunochemical test; or a stool DNA test as often as your healthcare provider advises; talk with your healthcare provider about which tests are best for you and when you no longer need colonoscopies (generally after age 75)   Depression All men in this age group At routine exams   Type 2 diabetes or prediabetes All adults beginning at age 45 and adults without symptoms at any age who are overweight or obese and have 1 or more other risk factors for diabetes At least every 3 years (yearly if your blood sugar has already begun to rise)   Hepatitis C Men at increased risk for infection - talk with your healthcare provider At routine exams   High cholesterol or triglycerides All men in this age group At least every 5 years   HIV Men at increased risk for infection - talk with your healthcare provider At routine exams   Lung cancer Adults ages 55 to 80 who have smoked Yearly screening in smokers with 30 pack-year history of smoking or who quit within 15 years   Obesity All men in this age group At routine exams   Prostate cancer All  men in this age group, talk to healthcare provider about risks and benefits of digital rectal exam (BRONWYN) and prostate-specific antigen (PSA) screening1 At routine exams   Syphilis Men at increased risk for infection - talk with your healthcare provider At routine exams   Tuberculosis Men at increased risk for infection - talk with your healthcare provider Ask your healthcare provider   Vision All men in this age group Every 1 to 2 years; if you have a chronic health condition, ask your healthcare provider if you needs exams more often   Vaccine Who needs it How often   Chickenpox (varicella) All men in this age group who have no record of this infection or vaccine 2 doses; second dose should be given at least 4 weeks after the first dose   Hepatitis A Men at increased risk for infection - talk with your healthcare provider 2 doses given at least 6 months apart   Hepatitis B Men at increased risk for infection - talk with your healthcare provider 3 doses over 6 months; second dose should be given 1 month after the first dose; the third dose should be given at least 2 months after the second dose and at least 4 months after the first dose   Haemophilus influenzae Type B (HIB) Men at increased risk for infection - talk with your healthcare provider 1 to 3 doses   Influenza (flu) All men in this age group  Once a year   Meningococcal Men at increased risk for infection - talk with your healthcare provider 1 or more doses   Pneumococcal conjugate vaccine (PCV13) and pneumococcal polysaccharide vaccine (PPSV23) All men in this age group 1 dose of each vaccine   Tetanus/diphtheria/  pertussis (Td/Tdap) booster All men in this age group Td every 10 years, or Tdap if you will have contact with a child younger than 12 months old   Zoster All men in this age group 1 dose   Counseling Who needs it How often   Diet and exercise Men who are overweight or obese When diagnosed, and then at routine exams   Fall prevention (exercise,  vitamin D supplements) All men in this age group At routine exams   Sexually transmitted infection Men at increased risk for infection - talk with your healthcare provider At routine exams   Use of daily aspirin Men ages 45 to 79 at risk for cardiovascular health problems At routine exams   Use of tobacco and the health effects it can cause All men in this age group Every visit   [address] [place] [address] [facility]   Date Last Reviewed: 2/1/2017  © 8338-6674 [employer]. [address] [place] [address], [place] [address]. All rights reserved. This information is not intended as a substitute for professional medical care. Always follow your healthcare professional's instructions.        GERD (Adult)    The esophagus is a tube that carries food from the mouth to the stomach. A valve at the lower end of the esophagus prevents stomach acid from flowing upward. When this valve doesn't work properly, stomach contents may repeatedly flow back up (reflux) into the esophagus. This is called gastroesophageal reflux disease (GERD). GERD can irritate the esophagus. It can cause problems with swallowing or breathing. In severe cases, GERD can cause recurrent pneumonia or other serious problems.  Symptoms of reflux include burning, pressure or sharp pain in the upper abdomen or mid to lower chest. The pain can spread to the neck, back, or shoulder. There may be belching, an acid taste in the back of the throat, chronic cough, or sore throat or hoarseness. GERD symptoms often occur during the day after a big meal. They can also occur at night when lying down.   Home care  Lifestyle changes can help reduce symptoms. If needed, medicines may be prescribed. Symptoms often improve with treatment, but if treatment is stopped, the symptoms often return after a few months. So most persons with GERD will need to continue treatment.  Lifestyle changes  · Limit or avoid fatty, fried, and spicy foods, as well as coffee, chocolate,  "mint, and foods with high acid content such as tomatoes and citrus fruit and juices (orange, grapefruit, lemon).  · Dont eat large meals, especially at night. Frequent, smaller meals are best. Do not lie down right after eating. And dont eat anything 3 hours before going to bed.  · Avoid drinking alcohol and smoking. As much as possible, stay away from second hand smoke.  · If you are overweight, losing weight will reduce symptoms.   · Avoid wearing tight clothing around your stomach area.  · If your symptoms occur during sleep, use a foam wedge to elevate your upper body (not just your head.) Or, place 4" blocks under the head of your bed.  Medicines  If needed, medicines can help relieve the symptoms of GERD and prevent damage to the esophagus. Discuss a medicine plan with your healthcare provider. This may include one or more of the following medicines:  · Antacids to help neutralize the normal acids in your stomach.  · Acid blockers (H2 blockers) to decrease acid production.  · Acid inhibitors (PPIs) to decrease acid production in a different way than the blockers. They may work better, but can take a little longer to take effect.  Take an antacid 30-60 minutes after eating and at bedtime, but not at the same time as an acid blocker.  Try not to take medicines such as ibuprofen and aspirin. If you are taking aspirin for your heart or other medical reasons, talk to your healthcare provider about stopping it.  Follow-up care  Follow up with your healthcare provider or as advised by our staff.  When to seek medical advice  Call your healthcare provider if any of the following occur:  · Stomach pain gets worse or moves to the lower right abdomen (appendix area)  · Chest pain appears or gets worse, or spreads to the back, neck, shoulder, or arm  · Frequent vomiting (cant keep down liquids)  · Blood in the stool or vomit (red or black in color)  · Feeling weak or dizzy  · Fever of 100.4ºF (38ºC) or higher, or as " directed by your healthcare provider  Date Last Reviewed: 6/23/2015  © 3871-4411 [employer]. [address] [place] [address], [place] [address]. All rights reserved. This information is not intended as a substitute for professional medical care. Always follow your healthcare professional's instructions.

## 2017-12-21 ENCOUNTER — HOSPITAL ENCOUNTER (OUTPATIENT)
Dept: RADIOLOGY | Facility: HOSPITAL | Age: 77
Discharge: HOME | End: 2017-12-21
Attending: INTERNAL MEDICINE
Payer: MEDICARE

## 2017-12-21 DIAGNOSIS — R10.9 RIGHT FLANK PAIN: ICD-10-CM

## 2017-12-21 PROCEDURE — 76700 US EXAM ABDOM COMPLETE: CPT | Mod: 26,,, | Performed by: RADIOLOGY

## 2017-12-21 PROCEDURE — 76700 US EXAM ABDOM COMPLETE: CPT | Mod: TC

## 2017-12-23 ENCOUNTER — PATIENT MESSAGE (OUTPATIENT)
Dept: INTERNAL MEDICINE | Facility: CLINIC | Age: 77
End: 2017-12-23

## 2018-01-09 ENCOUNTER — OFFICE VISIT (OUTPATIENT)
Dept: ORTHOPEDICS | Facility: CLINIC | Age: 78
End: 2018-01-09
Payer: MEDICARE

## 2018-01-09 ENCOUNTER — HOSPITAL ENCOUNTER (OUTPATIENT)
Dept: RADIOLOGY | Facility: HOSPITAL | Age: 78
Discharge: HOME | End: 2018-01-09
Attending: NURSE PRACTITIONER
Payer: MEDICARE

## 2018-01-09 VITALS — HEIGHT: 68 IN | BODY MASS INDEX: 33.95 KG/M2 | WEIGHT: 224 LBS

## 2018-01-09 DIAGNOSIS — M25.562 LEFT KNEE PAIN, UNSPECIFIED CHRONICITY: ICD-10-CM

## 2018-01-09 DIAGNOSIS — M25.551 RIGHT HIP PAIN: ICD-10-CM

## 2018-01-09 DIAGNOSIS — M25.561 RIGHT KNEE PAIN, UNSPECIFIED CHRONICITY: ICD-10-CM

## 2018-01-09 DIAGNOSIS — M17.0 PRIMARY OSTEOARTHRITIS OF BOTH KNEES: ICD-10-CM

## 2018-01-09 DIAGNOSIS — M25.561 RIGHT KNEE PAIN, UNSPECIFIED CHRONICITY: Primary | ICD-10-CM

## 2018-01-09 PROCEDURE — 73562 X-RAY EXAM OF KNEE 3: CPT | Mod: 26,59,RT, | Performed by: RADIOLOGY

## 2018-01-09 PROCEDURE — 99213 OFFICE O/P EST LOW 20 MIN: CPT | Mod: 25,S$GLB,, | Performed by: NURSE PRACTITIONER

## 2018-01-09 PROCEDURE — 73564 X-RAY EXAM KNEE 4 OR MORE: CPT | Mod: TC,LT

## 2018-01-09 PROCEDURE — 73502 X-RAY EXAM HIP UNI 2-3 VIEWS: CPT | Mod: TC,RT

## 2018-01-09 PROCEDURE — 73564 X-RAY EXAM KNEE 4 OR MORE: CPT | Mod: 26,LT,, | Performed by: RADIOLOGY

## 2018-01-09 PROCEDURE — 20610 DRAIN/INJ JOINT/BURSA W/O US: CPT | Mod: 50,S$GLB,, | Performed by: NURSE PRACTITIONER

## 2018-01-09 PROCEDURE — 99999 PR PBB SHADOW E&M-EST. PATIENT-LVL III: CPT | Mod: PBBFAC,,, | Performed by: NURSE PRACTITIONER

## 2018-01-09 PROCEDURE — 73502 X-RAY EXAM HIP UNI 2-3 VIEWS: CPT | Mod: 26,RT,, | Performed by: RADIOLOGY

## 2018-01-09 RX ORDER — TRIAMCINOLONE ACETONIDE 40 MG/ML
80 INJECTION, SUSPENSION INTRA-ARTICULAR; INTRAMUSCULAR
Status: COMPLETED | OUTPATIENT
Start: 2018-01-09 | End: 2018-01-09

## 2018-01-09 RX ADMIN — TRIAMCINOLONE ACETONIDE 80 MG: 40 INJECTION, SUSPENSION INTRA-ARTICULAR; INTRAMUSCULAR at 01:01

## 2018-01-09 NOTE — PROGRESS NOTES
CC: right knee pain    HPI: Pt with bilateral knee pain right>left for the past several months. He had a right hip replacement by Dr. Navarro last year. He has not been seen for his knees in the past. The pain is medial and aching. He likes to work in the yard, but he has had weakness of the knee while working and he is afraid of falling. He has taken otc medication for the pain without relief. He is ambulating without assistive device. There is not a limp.    ROS  General: denies fever and chills  Resp: no c/o sob  CVS: no c/o cp  MSK: c/o bilateral knee pain which is aching and medial and worse with activity. There is some instability, but no reported falls    PE  General: AAOx3, pleasant and cooperative  Resp: respirations even and unlabored  MSK: bilateral knee exam  0 degrees extension  120 degrees flexion  No warmth or erythema   - effusion    Xray:  Reviewed by me: DJD with significant narrowing of the medial tibiofemoral joint space is identified.  Narrowing of the patellofemoral joint space also noted.  No fracture or dislocation.  No bone destruction identified    Assessment:  Bilateral knee djd    Plan:  Cortisone injections to bilateral knees today  RICE  Xray of right hip reveals no fracture or dislocation with well situated prosthesis- f/u in one year  Tylenol prn  F/u as needed    Knee Injection Procedure Note    Pre-operative Diagnosis: bilateral knee degenerative arthritis    Post-operative Diagnosis: same    Indications: bilateral knee pain    Anesthesia: none    Procedure Details     Verbal consent was obtained for the procedure. The injection site was identified and the skin was prepared with alcohol. The bilateral knee was injected from an anterolateral approach with 1 ml of Kenalog and 5 ml Lidocaine under sterile technique using a 22 gauge needle. The needle was removed and the area cleansed and dressed.    Complications:  None; patient tolerated the procedure well.    he was advised to rest  the knee today, using ice and elevation as needed for comfort and swelling. he did receive immediate relief of the knee pain. he was told this would be short lived and is secondary to the lidocaine. he may have an increase in discomfort tonight followed by steady improvement over the next several days. It may take 1-3 weeks following the injection to get the full benefit of the medication.

## 2018-01-23 ENCOUNTER — PES CALL (OUTPATIENT)
Dept: ADMINISTRATIVE | Facility: CLINIC | Age: 78
End: 2018-01-23

## 2018-03-21 ENCOUNTER — PES CALL (OUTPATIENT)
Dept: ADMINISTRATIVE | Facility: CLINIC | Age: 78
End: 2018-03-21

## 2018-05-16 ENCOUNTER — TELEPHONE (OUTPATIENT)
Dept: DERMATOLOGY | Facility: CLINIC | Age: 78
End: 2018-05-16

## 2018-05-16 RX ORDER — ATORVASTATIN CALCIUM 20 MG/1
20 TABLET, FILM COATED ORAL DAILY
Qty: 90 TABLET | Refills: 3 | Status: SHIPPED | OUTPATIENT
Start: 2018-05-16 | End: 2019-03-18 | Stop reason: SDUPTHER

## 2018-05-16 NOTE — TELEPHONE ENCOUNTER
----- Message from Ayden Keller sent at 5/16/2018  3:57 PM CDT -----  Contact: Patient   Patient Requesting Appointment.     Reason:Past pt of Dr Gann's, now considered new, would like to continue care with provider    Name of Caller:pt  Symptoms:requesting skin check   Communication Preference: phone# [phone]  Additional Information:

## 2018-05-31 ENCOUNTER — OFFICE VISIT (OUTPATIENT)
Dept: OPTOMETRY | Facility: CLINIC | Age: 78
End: 2018-05-31
Payer: MEDICARE

## 2018-05-31 DIAGNOSIS — H52.13 MYOPIA WITH PRESBYOPIA OF BOTH EYES: ICD-10-CM

## 2018-05-31 DIAGNOSIS — H52.4 MYOPIA WITH PRESBYOPIA OF BOTH EYES: ICD-10-CM

## 2018-05-31 DIAGNOSIS — H04.123 DRY EYE SYNDROME OF BOTH EYES: ICD-10-CM

## 2018-05-31 DIAGNOSIS — H25.13 NUCLEAR SCLEROSIS, BILATERAL: Primary | ICD-10-CM

## 2018-05-31 PROCEDURE — 99499 UNLISTED E&M SERVICE: CPT | Mod: S$PBB,,, | Performed by: OPTOMETRIST

## 2018-05-31 PROCEDURE — 99999 PR PBB SHADOW E&M-EST. PATIENT-LVL II: CPT | Mod: PBBFAC,,, | Performed by: OPTOMETRIST

## 2018-05-31 PROCEDURE — 92014 COMPRE OPH EXAM EST PT 1/>: CPT | Mod: S$GLB,,, | Performed by: OPTOMETRIST

## 2018-05-31 NOTE — PROGRESS NOTES
HPI     Last eye exam was 4/20/17 with Dr. Aguirre.  Patient states vision has gotten a little worse since last exam. Uses   +3.50 OTC readers for small print.  Not interested in rx glasses-no distance vision complaints. Sometimes   feels like something dragging underneath RUL and makes OD sore-going on   for about a year.   Patient denies diplopia, headaches, flashes/floaters, and pain.        Last edited by Shayna Yanes on 5/31/2018 10:20 AM. (History)            Assessment /Plan     For exam results, see Encounter Report.    Nuclear sclerosis, bilateral    Dry eye syndrome of both eyes    Myopia with presbyopia of both eyes                1.  Educated on cataracts and affects on vision.  Patient happy with vision.  Monitor.  2.  Educated pt foreign body sensation and burning due to dry eye.  Recommend using artificial tears at least 2x/day OU.  3.   Continue w/ current rx.  Recommend +2.00 OTC readers.            RTC 1 year for routine exam.

## 2018-06-05 ENCOUNTER — OFFICE VISIT (OUTPATIENT)
Dept: DERMATOLOGY | Facility: CLINIC | Age: 78
End: 2018-06-05
Payer: MEDICARE

## 2018-06-05 ENCOUNTER — PES CALL (OUTPATIENT)
Dept: ADMINISTRATIVE | Facility: CLINIC | Age: 78
End: 2018-06-05

## 2018-06-05 DIAGNOSIS — L21.9 SEBORRHEA: ICD-10-CM

## 2018-06-05 DIAGNOSIS — Z12.83 SCREENING EXAM FOR SKIN CANCER: ICD-10-CM

## 2018-06-05 DIAGNOSIS — B07.9 VIRAL WARTS, UNSPECIFIED TYPE: ICD-10-CM

## 2018-06-05 DIAGNOSIS — L28.1 PRURIGO NODULARIS: Primary | ICD-10-CM

## 2018-06-05 PROCEDURE — 99213 OFFICE O/P EST LOW 20 MIN: CPT | Mod: 25,S$GLB,, | Performed by: DERMATOLOGY

## 2018-06-05 PROCEDURE — 99999 PR PBB SHADOW E&M-EST. PATIENT-LVL II: CPT | Mod: PBBFAC,,, | Performed by: DERMATOLOGY

## 2018-06-05 PROCEDURE — 17110 DESTRUCTION B9 LES UP TO 14: CPT | Mod: S$GLB,,, | Performed by: DERMATOLOGY

## 2018-06-05 RX ORDER — CLOBETASOL PROPIONATE 0.5 MG/G
OINTMENT TOPICAL
Qty: 60 G | Refills: 1 | Status: SHIPPED | OUTPATIENT
Start: 2018-06-05 | End: 2019-01-15

## 2018-06-05 NOTE — PROGRESS NOTES
Subjective:       Patient ID:  Herber Camarillo is a 77 y.o. male who presents for   Chief Complaint   Patient presents with    Skin Check     Patient presents today for skin check. No history of skin cancer. Denies concerning lesions today. Reports a skin tag on his leg that he would like removed, gets irritated by his leg brace. He also notes some erythema and scale of his scalp, nasolabial folds, and ears. He is using dandruff shampoo and it is helping.          Review of Systems   Skin: Positive for rash and dry skin. Negative for daily sunscreen use and activity-related sunscreen use.   Hematologic/Lymphatic: Bruises/bleeds easily.        Objective:    Physical Exam   Constitutional: He appears well-developed and well-nourished. No distress.   Neurological: He is alert and oriented to person, place, and time. He is not disoriented.   Psychiatric: He has a normal mood and affect.   Skin:   Areas Examined (abnormalities noted in diagram):   Scalp / Hair Palpated and Inspected  Head / Face Inspection Performed  Neck Inspection Performed  Chest / Axilla Inspection Performed  Abdomen Inspection Performed  Back Inspection Performed  RUE Inspected  LUE Inspection Performed  RLE Inspected  LLE Inspection Performed  Nails and Digits Inspection Performed                           Diagram Legend     Erythematous scaling macule/papule c/w actinic keratosis       Vascular papule c/w angioma      Pigmented verrucoid papule/plaque c/w seborrheic keratosis      Yellow umbilicated papule c/w sebaceous hyperplasia      Irregularly shaped tan macule c/w lentigo     1-2 mm smooth white papules consistent with Milia      Movable subcutaneous cyst with punctum c/w epidermal inclusion cyst      Subcutaneous movable cyst c/w pilar cyst      Firm pink to brown papule c/w dermatofibroma      Pedunculated fleshy papule(s) c/w skin tag(s)      Evenly pigmented macule c/w junctional nevus     Mildly variegated pigmented, slightly  irregular-bordered macule c/w mildly atypical nevus      Flesh colored to evenly pigmented papule c/w intradermal nevus       Pink pearly papule/plaque c/w basal cell carcinoma      Erythematous hyperkeratotic cursted plaque c/w SCC      Surgical scar with no sign of skin cancer recurrence      Open and closed comedones      Inflammatory papules and pustules      Verrucoid papule consistent consistent with wart     Erythematous eczematous patches and plaques     Dystrophic onycholytic nail with subungual debris c/w onychomycosis     Umbilicated papule    Erythematous-base heme-crusted tan verrucoid plaque consistent with inflamed seborrheic keratosis     Erythematous Silvery Scaling Plaque c/w Psoriasis     See annotation      Assessment / Plan:        Prurigo nodularis - forearms -   -     clobetasol 0.05% (TEMOVATE) 0.05 % Oint; AAA bid to itchy spots on arms x 2 weeks  Dispense: 60 g; Refill: 1  Repeated picking, rubbing, and scratching of the skin can exacerbate the condition and lead to pigmentary changes and scarring.  The patient was urged to stop these behaviors.    Screening exam for skin cancer  Total body skin examination performed today including at least 12 points as noted in physical examination. No lesions suspicious for malignancy noted.    Seborrhea  -Patient instructed to use an OTC medicated shampoo containing any of the following active ingredients: Selenium sulfide, Zinc Pyrithione, Tar, Salicylic acid, Ketoconazole). Patient should rotate the active ingredients to avoid building tolerance. Patient should allow shampoo to sit on scalp at least 5 minutes prior to rinsing and should wash hair a minimum of 2 times/week.     Viral warts posterior left knee  Cryosurgery procedure note:  Verbal consent from the patient is obtained including, but not limited to, risk of hypopigmentation/hyperpigmentation, scar, recurrence of lesion. Liquid nitrogen cryosurgery is applied to 1 lesions to produce a  freeze injury. The patient is aware that blisters may form and is instructed on wound care with gentle cleansing and use of vaseline ointment to keep moist until healed. The patient is supplied a handout on cryosurgery and is instructed to call if lesions do not completely resolve.             Follow-up if symptoms worsen or fail to improve.

## 2018-06-26 ENCOUNTER — OFFICE VISIT (OUTPATIENT)
Dept: INTERNAL MEDICINE | Facility: CLINIC | Age: 78
End: 2018-06-26
Payer: MEDICARE

## 2018-06-26 VITALS
SYSTOLIC BLOOD PRESSURE: 120 MMHG | DIASTOLIC BLOOD PRESSURE: 70 MMHG | HEIGHT: 68 IN | WEIGHT: 218.25 LBS | BODY MASS INDEX: 33.08 KG/M2

## 2018-06-26 DIAGNOSIS — I51.89 DIASTOLIC DYSFUNCTION: ICD-10-CM

## 2018-06-26 DIAGNOSIS — R91.1 LUNG NODULE, SOLITARY: ICD-10-CM

## 2018-06-26 DIAGNOSIS — E67.8 MULTIPLE VITAMIN EXCESS DISEASE: ICD-10-CM

## 2018-06-26 DIAGNOSIS — I70.0 ATHEROSCLEROSIS OF AORTA: ICD-10-CM

## 2018-06-26 DIAGNOSIS — Z12.5 ENCOUNTER FOR SCREENING FOR MALIGNANT NEOPLASM OF PROSTATE: ICD-10-CM

## 2018-06-26 DIAGNOSIS — C61 PROSTATE CANCER: ICD-10-CM

## 2018-06-26 DIAGNOSIS — H61.23 BILATERAL IMPACTED CERUMEN: ICD-10-CM

## 2018-06-26 DIAGNOSIS — E66.09 NON MORBID OBESITY DUE TO EXCESS CALORIES: ICD-10-CM

## 2018-06-26 DIAGNOSIS — I25.10 CORONARY ARTERY DISEASE INVOLVING NATIVE CORONARY ARTERY OF NATIVE HEART WITHOUT ANGINA PECTORIS: ICD-10-CM

## 2018-06-26 DIAGNOSIS — E78.2 MIXED HYPERLIPIDEMIA: ICD-10-CM

## 2018-06-26 DIAGNOSIS — Z00.00 ANNUAL PHYSICAL EXAM: Primary | ICD-10-CM

## 2018-06-26 DIAGNOSIS — E79.89 URIC ACID CRYSTALLOPATHY: ICD-10-CM

## 2018-06-26 PROCEDURE — 99397 PER PM REEVAL EST PAT 65+ YR: CPT | Mod: S$GLB,,, | Performed by: INTERNAL MEDICINE

## 2018-06-26 PROCEDURE — 99499 UNLISTED E&M SERVICE: CPT | Mod: S$PBB,,, | Performed by: INTERNAL MEDICINE

## 2018-06-26 PROCEDURE — 99999 PR PBB SHADOW E&M-EST. PATIENT-LVL III: CPT | Mod: PBBFAC,,, | Performed by: INTERNAL MEDICINE

## 2018-06-26 NOTE — PATIENT INSTRUCTIONS
Advance Medical Directive    An advance medical directive is a form that lets you plan ahead for the care youd want if you could no longer express your wishes. This statement outlines the medical treatment youd want or names the person youd wish to make healthcare decisions for you. Be aware that laws vary from state to state, and it may be worthwhile to talk with an .  Writing down your wishes  · An advance directive is important whether youre young or old. Injury or illness can strike at any age.  · Decide what is important to you and the kind of treatment youd want, or not want to have.  · Some states allow only one kind of advance directive. Some let you do both a Durable Power of  for Health Care and a Living Will. Some states put both kinds on the same form.  A Durable Power of  for Health Care  · This form lets you name someone else to be your agent.  · This person can decide on treatment for you only when you cant speak for yourself.  · You do not need to be at the end of your life. He or she could speak for you if you were in a coma but were likely to recover.  A Living Will  · This form lets you list the care you want at the end of your life.  · A living will applies only if you wont live without medical treatment. It would apply if you had advanced cancer, a massive stroke, or other serious illness from which you will not recover.  · It takes effect only when you can no longer express your wishes yourself.  Date Last Reviewed: 2/13/2016  © 4586-8443 [employer]. [address] [place] [address], [place] [address]. All rights reserved. This information is not intended as a substitute for professional medical care. Always follow your healthcare professional's instructions.        An Agents Role for Durable Power of     Its impossible to know which medical treatment choices you might face in the future. What if you aren't able to make these decisions for  yourself? A durable power of  for health care lets you name an agent to carry out your wishes. This happens only if you cant express your wishes yourself.  An agents duty  Your agent respects your wishes in the following ways:   · Your agents duty is to see that your wishes are followed.  · If your wishes arent known, your agent should try to decide what you want.  · Your agents choices come before anyone elses wishes for you.  · A durable power of  for health care does not give your agent control over your money. Your agent also cant be made to pay your bills.  Find out what your agent can do  Restrictions on what an agent can and cant do vary by state. Check your state laws. In most states your agent can:  · Choose or refuse life-sustaining and other medical treatment on your behalf.  · Consent to and then stop treatment if your condition doesnt improve.  · Access and release your medical records.  · Request an autopsy and donate your organs, unless youve stated otherwise on your advance directive.  Find out whether your state allows your agent to do the following:  · Refuse or withdraw life-enhancing care.  · Refuse or stop tube feeding or other life-sustaining care--even if you havent stated on your advance directive that you dont want these treatments.  · Order sterilization or .  Date Last Reviewed: 2016  © 5498-0409 "[employer]. [address] [place] [address], [place] [address]. All rights reserved. This information is not intended as a substitute for professional medical care. Always follow your healthcare professional's instructions.        Life Support    If you understand how specific treatments may affect your quality of life, you can decide which ones youd choose or refuse. You may want to talk to your healthcare provider about the possible benefits and risks of treatments. The chance of good results from these therapies varies based on each individual  clinical situation and can be very difficult to predict. Medical treatment, if your life is in danger, falls into three main categories.  Life supporting  · This care keeps your heart and lungs going when they can no longer work on their own.  · CPR restarts your heart and lungs if they stop working.  · A respirator (or ventilator) keeps you breathing. Air is pumped into your lungs through a tube thats put into your windpipe.  Life sustaining  · This care keeps you alive longer when you have an illness that cant be cured.  · Tube feeding or TPN (total parenteral nutrition) provides food and fluids through a tube or IV. It is given if you cant chew or swallow on your own.  · Dialysis is a kidney machine that cleans your blood when your kidneys can no longer work on their own.  Life enhancing  · This care controls pain and discomfort, such as nausea or difficulty breathing. This type of care is not designed to prolong your life, but to enhance quality of life. Nothing is done to keep you alive longer.  · Hospice care is comfort care. It might provide food and fluids by mouth or help with bathing. Hospice care is given during the last stages of a terminal illness.  · Strong pain medicine can be given to help keep you comfortable.  Do Not Resuscitate  Would you want CPR if your heart stops while youre a patient in a hospital or nursing home? If not, talk to your healthcare provider about issuing a DNR (Do-Not-Resuscitate) order.  Be aware  DNRs and advance directives may not apply during anesthesia, in emergency rooms, or when emergency medical teams respond to a 911 call. Ask your healthcare provider how you can make sure your wishes will be followed. Also, a DNR will not prevent you from getting other kinds of needed medical care such as treatment for pain, or bleeding.   Date Last Reviewed: 2/26/2016  © 0433-3023 [employer]. [address] [place] [address], [place] [address]. All rights reserved. This  information is not intended as a substitute for professional medical care. Always follow your healthcare professional's instructions.    EMIGDIO

## 2018-06-26 NOTE — PROGRESS NOTES
"Subjective:       Patient ID: Herber Camarillo is a 77 y.o. male.    Chief Complaint: Annual Exam    Per patient, "annual" again- just seen for same in December.    Here with wife.    Some DJD changes,knee primarily.  Has seen Ortho and gotten shots.    Labs acceptable    D high normal, OK to stop.    Patient Active Problem List:     Uric acid crystallopathy     Mixed hyperlipidemia     Prostate cancer     Diastolic dysfunction     Atherosclerosis of aorta: see CT scan 7/15     Lung nodule, solitary .7 cm 7/15; stable 12/16     Coronary artery disease involving native coronary artery of native heart without angina pectoris     Non morbid obesity due to excess calories     Primary osteoarthritis of right hip        Review of Systems   Constitutional: Negative for activity change and unexpected weight change.   HENT: Negative for hearing loss, rhinorrhea and trouble swallowing.    Eyes: Positive for visual disturbance. Negative for discharge.        Has had an eye exam recently, no findings other than dry eyes   Respiratory: Negative for chest tightness and wheezing.    Cardiovascular: Negative for chest pain and palpitations.   Gastrointestinal: Negative for blood in stool, constipation, diarrhea and vomiting.   Endocrine: Negative for polydipsia and polyuria.   Genitourinary: Negative for difficulty urinating, hematuria and urgency.   Musculoskeletal: Positive for arthralgias. Negative for joint swelling and neck pain.   Neurological: Negative for weakness and headaches.   Psychiatric/Behavioral: Negative for confusion and dysphoric mood.       Objective:      Physical Exam   Constitutional: He is oriented to person, place, and time. He appears well-developed and well-nourished.   HENT:   Head: Normocephalic and atraumatic.   Right Ear: External ear normal.   Left Ear: External ear normal.   Wax both canals   Eyes: Conjunctivae and EOM are normal. Pupils are equal, round, and reactive to light.   Neck: Normal range of " motion. Neck supple. No thyromegaly present.   Cardiovascular: Normal rate and regular rhythm.    No murmur heard.  Pulmonary/Chest: Effort normal and breath sounds normal. No respiratory distress. He has no wheezes.   Abdominal: Soft. He exhibits no distension. There is no tenderness.   Musculoskeletal: He exhibits no edema or tenderness.   Lymphadenopathy:     He has no cervical adenopathy.   Neurological: He is alert and oriented to person, place, and time. No cranial nerve deficit.   Skin: Skin is warm and dry.   Psychiatric: He has a normal mood and affect. His behavior is normal.       Assessment:       1. Annual physical exam    2. Atherosclerosis of aorta: see CT scan 7/15    3. Coronary artery disease involving native coronary artery of native heart without angina pectoris    4. Lung nodule, solitary .7 cm 7/15; stable 12/16    5. Diastolic dysfunction    6. Mixed hyperlipidemia    7. Prostate cancer    8. Non morbid obesity due to excess calories    9. Uric acid crystallopathy    10. Multiple vitamin excess disease    11. Encounter for screening for malignant neoplasm of prostate     12. Bilateral impacted cerumen        Plan:         Annual physical exam    Atherosclerosis of aorta: see CT scan 7/15: no sx; continue regimen  -     Comprehensive metabolic panel; Future; Expected date: 10/24/2018  -     Lipid panel; Future; Expected date: 10/24/2018    Coronary artery disease involving native coronary artery of native heart without angina pectoris  -     Ambulatory referral to Cardiology    Lung nodule, solitary .7 cm 7/15; stable 12/16: never smoker, no sx    Diastolic dysfunction: keep Cardiology follow up; no sx    Mixed hyperlipidemia: continue regimen    Prostate cancer  -     PSA, Screening; Future; Expected date: 10/24/2018  -     CBC auto differential; Future; Expected date: 10/24/2018    Non morbid obesity due to excess calories: Exercise, eating modification and lifestyle issues reviewed.  10-15 #  weight loss in next year recommended.  Patient declines dietician or bariatric appt    Uric acid crystallopathy  -     Uric acid; Future; Expected date: 10/24/2018    Multiple vitamin excess disease  -     Vitamin D; Future; Expected date: 10/24/2018    Encounter for screening for malignant neoplasm of prostate   -     PSA, Screening; Future; Expected date: 10/24/2018    Bilateral impacted cerumen: otc recommendations discussed  -     Ambulatory consult to ENT    Keep DERM follow up    Screenings up to date  No falls, no fall risk  No dementia or depression  Immunizations reviewed- Shingrix when available  Advance directives discussed, handouts given    I will review all studies and determine further tx depending on findings

## 2018-07-19 ENCOUNTER — OFFICE VISIT (OUTPATIENT)
Dept: CARDIOLOGY | Facility: CLINIC | Age: 78
End: 2018-07-19
Payer: MEDICARE

## 2018-07-19 VITALS
SYSTOLIC BLOOD PRESSURE: 126 MMHG | DIASTOLIC BLOOD PRESSURE: 61 MMHG | WEIGHT: 221.13 LBS | HEART RATE: 56 BPM | HEIGHT: 68 IN | BODY MASS INDEX: 33.51 KG/M2

## 2018-07-19 DIAGNOSIS — E66.09 NON MORBID OBESITY DUE TO EXCESS CALORIES: ICD-10-CM

## 2018-07-19 DIAGNOSIS — E78.5 HYPERLIPIDEMIA, UNSPECIFIED HYPERLIPIDEMIA TYPE: ICD-10-CM

## 2018-07-19 DIAGNOSIS — I25.10 CORONARY ARTERY DISEASE INVOLVING NATIVE CORONARY ARTERY OF NATIVE HEART WITHOUT ANGINA PECTORIS: Primary | ICD-10-CM

## 2018-07-19 DIAGNOSIS — I70.0 ATHEROSCLEROSIS OF AORTA: ICD-10-CM

## 2018-07-19 DIAGNOSIS — I51.89 DIASTOLIC DYSFUNCTION: ICD-10-CM

## 2018-07-19 PROCEDURE — 99499 UNLISTED E&M SERVICE: CPT | Mod: S$GLB,,, | Performed by: INTERNAL MEDICINE

## 2018-07-19 PROCEDURE — 99999 PR PBB SHADOW E&M-EST. PATIENT-LVL III: CPT | Mod: PBBFAC,,, | Performed by: INTERNAL MEDICINE

## 2018-07-19 PROCEDURE — 99214 OFFICE O/P EST MOD 30 MIN: CPT | Mod: S$GLB,,, | Performed by: INTERNAL MEDICINE

## 2018-07-19 NOTE — LETTER
July 19, 2018      Fidelina Loyola MD  [address]  [facility] [address]           [facility] - Cardiology  [address]  [facility] [place] [address]  Phone: [phone]          Patient: Herber Camarillo   MR Number: [id number]   YOB: 1940   Date of Visit: 7/19/2018       Dear Dr. Fidelina Loyola:    Thank you for referring Herber Camarillo to me for evaluation. Attached you will find relevant portions of my assessment and plan of care.    If you have questions, please do not hesitate to call me. I look forward to following Herber Camarillo along with you.    Sincerely,    Lauri Nino MD    Enclosure  CC:  No Recipients    If you would like to receive this communication electronically, please contact [email or web address] or [phone] to request more information on [employer] access.    For providers and/or their staff who would like to refer a patient to [facility], please contact us through our one-stop-shop provider referral line, [facility] , at [phone].    If you feel you have received this communication in error or would no longer like to receive these types of communications, please e-mail [email or web address]

## 2018-07-19 NOTE — PROGRESS NOTES
Subjective:    Patient ID:  Herber Camarillo is a 77 y.o. male who presents for follow-up of CAD    HPI     The patient is a 77 year male followed  post NSTEMI [?PCI to LAD] 2000. . He continues to do well and remains active and reports to chest pain or WILKINS  Lab Results   Component Value Date     06/13/2018    K 4.1 06/13/2018     06/13/2018    CO2 26 06/13/2018    BUN 13 06/13/2018    CREATININE 0.89 06/13/2018     06/13/2018    HGBA1C 4.9 12/04/2017    AST 32 06/13/2018    ALT 46 (H) 06/13/2018    ALBUMIN 3.9 06/13/2018    PROT 6.9 06/13/2018    BILITOT 0.9 06/13/2018    WBC 7.55 12/04/2017    HGB 16.7 12/04/2017    HCT 48.7 12/04/2017    MCV 93 12/04/2017     12/04/2017    INR 1.0 12/16/2016    PSA 0.03 12/04/2017    TSH 1.987 12/16/2016         Lab Results   Component Value Date    CHOL 116 (L) 06/13/2018    HDL 34 (L) 06/13/2018    TRIG 111 06/13/2018       Lab Results   Component Value Date    LDLCALC 59.8 (L) 06/13/2018       Past Medical History:   Diagnosis Date    Arthritis     CAD (coronary artery disease) 10/30/2012    Cataract     Dermatochalasis of both upper eyelids     Fever blister     Glucose intolerance (impaired glucose tolerance) 7/24/2013    Heart attack     Hyperlipidemia 10/30/2012    Joint pain     Keloid cicatrix     Lung nodule, solitary 4/30/2015    Non morbid obesity due to excess calories 10/20/2015    Obesity     Overweight(278.02) 9/5/2013    Prostate cancer     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       Current Outpatient Prescriptions:     allopurinol (ZYLOPRIM) 300 MG tablet, TAKE 1 TABLET ONE TIME DAILY, Disp: 90 tablet, Rfl: 3    aspirin (ECOTRIN) 81 MG EC tablet, Take 81 mg by mouth once daily., Disp: , Rfl:     atorvastatin (LIPITOR) 20 MG tablet, Take 1 tablet (20 mg total) by mouth once daily., Disp: 90 tablet, Rfl: 3    b complex vitamins tablet, Take 1 tablet by mouth once daily., Disp: , Rfl:     clobetasol 0.05% (TEMOVATE) 0.05 %  Oint, AAA bid to itchy spots on arms x 2 weeks, Disp: 60 g, Rfl: 1    GLUCOSAMINE HCL ORAL, Take by mouth once daily., Disp: , Rfl:     metoprolol succinate (TOPROL-XL) 25 MG 24 hr tablet, Take 1 tablet (25 mg total) by mouth once daily., Disp: 90 tablet, Rfl: 3    nitroGLYCERIN (NITROSTAT) 0.4 MG SL tablet, Place 1 tablet (0.4 mg total) under the tongue every 5 (five) minutes as needed for Chest pain. ONE UNDER TONGUE FOR ANGINA MAY REPEAT IN 5 MIN IF IT STILL CONTINUES, CALL 911 AND TAKE AN ASPIRIN, Disp: 25 tablet, Rfl: 12    omega-3 fatty acids-vitamin E (FISH OIL) 1,000 mg Cap, Take by mouth., Disp: , Rfl:         Review of Systems   Constitution: Negative for decreased appetite, diaphoresis, fever, weakness, malaise/fatigue, weight gain and weight loss.   HENT: Negative for congestion, ear discharge, ear pain and nosebleeds.    Eyes: Negative for blurred vision, double vision and visual disturbance.   Cardiovascular: Negative for chest pain, claudication, cyanosis, dyspnea on exertion, irregular heartbeat, leg swelling, near-syncope, orthopnea, palpitations, paroxysmal nocturnal dyspnea and syncope.   Respiratory: Negative for cough, hemoptysis, shortness of breath, sleep disturbances due to breathing, snoring, sputum production and wheezing.    Endocrine: Negative for polydipsia, polyphagia and polyuria.   Hematologic/Lymphatic: Negative for adenopathy and bleeding problem. Does not bruise/bleed easily.   Skin: Negative for color change, nail changes, poor wound healing and rash.   Musculoskeletal: Negative for muscle cramps and muscle weakness.   Gastrointestinal: Negative for abdominal pain, anorexia, change in bowel habit, hematochezia, nausea and vomiting.   Genitourinary: Negative for dysuria, frequency and hematuria.   Neurological: Negative for brief paralysis, difficulty with concentration, excessive daytime sleepiness, dizziness, focal weakness, headaches, light-headedness, seizures and vertigo.  "  Psychiatric/Behavioral: Negative for altered mental status and depression.   Allergic/Immunologic: Negative for persistent infections.        Objective:/61 (BP Location: Left arm, Patient Position: Sitting, BP Method: Large (Automatic))   Pulse (!) 56   Ht 5' 8" (1.727 m)   Wt 100.3 kg (221 lb 1.9 oz)   BMI 33.62 kg/m²           Physical Exam   Constitutional: He is oriented to person, place, and time. He appears well-developed and well-nourished.   HENT:   Head: Normocephalic.   Right Ear: External ear normal.   Left Ear: External ear normal.   Nose: Nose normal.   Inspection of lips, teeth and gums normal   Eyes: EOM are normal. Pupils are equal, round, and reactive to light. No scleral icterus.   Neck: Normal range of motion. Neck supple. No JVD present. No tracheal deviation present. No thyromegaly present.   Cardiovascular: Normal rate, regular rhythm and intact distal pulses.  Exam reveals no gallop and no friction rub.    No murmur heard.  Pulses:       Carotid pulses are 2+ on the right side, and 2+ on the left side.       Dorsalis pedis pulses are 2+ on the right side, and 2+ on the left side.        Posterior tibial pulses are 2+ on the right side, and 2+ on the left side.   Pulmonary/Chest: Effort normal and breath sounds normal.   Abdominal: Bowel sounds are normal. He exhibits no distension. There is no hepatosplenomegaly. There is no tenderness. There is no guarding.   Musculoskeletal: Normal range of motion. He exhibits no edema or tenderness.   Lymphadenopathy:   Palpation of neck and groin lymph nodes normal   Neurological: He is alert and oriented to person, place, and time. No cranial nerve deficit. He exhibits normal muscle tone. Coordination normal.   Skin: Skin is dry.   Palpation of skin normal   Psychiatric: His behavior is normal. Judgment and thought content normal.         Assessment:       1. Coronary artery disease involving native coronary artery of native heart without " angina pectoris    2. Diastolic dysfunction    3. Atherosclerosis of aorta: see CT scan 7/15    4. Non morbid obesity due to excess calories    5. Hyperlipidemia, unspecified hyperlipidemia type         Plan:       Herber was seen today for coronary artery disease involving native coronary artery of .    Diagnoses and all orders for this visit:    Coronary artery disease involving native coronary artery of native heart without angina pectoris  -     CBC auto differential; Future; Expected date: 07/19/2019  -     Comprehensive metabolic panel; Future; Expected date: 07/20/2018    Diastolic dysfunction    Atherosclerosis of aorta: see CT scan 7/15    Non morbid obesity due to excess calories    Hyperlipidemia, unspecified hyperlipidemia type  -     Lipid panel; Future; Expected date: 07/19/2019    Other orders  -     GLUCOSAMINE HCL ORAL; Take by mouth once daily.

## 2018-08-08 ENCOUNTER — PATIENT MESSAGE (OUTPATIENT)
Dept: DERMATOLOGY | Facility: CLINIC | Age: 78
End: 2018-08-08

## 2018-08-10 DIAGNOSIS — L60.3 NAIL DYSTROPHY: Primary | ICD-10-CM

## 2018-08-10 RX ORDER — CICLOPIROX 80 MG/ML
SOLUTION TOPICAL DAILY
Qty: 6.6 ML | Refills: 3 | Status: SHIPPED | OUTPATIENT
Start: 2018-08-10 | End: 2019-01-15

## 2018-09-14 ENCOUNTER — TELEPHONE (OUTPATIENT)
Dept: INTERNAL MEDICINE | Facility: CLINIC | Age: 78
End: 2018-09-14

## 2018-09-14 RX ORDER — ALLOPURINOL 300 MG/1
300 TABLET ORAL DAILY
Qty: 90 TABLET | Refills: 3 | Status: SHIPPED | OUTPATIENT
Start: 2018-09-14 | End: 2019-07-01 | Stop reason: SDUPTHER

## 2018-09-17 ENCOUNTER — PATIENT MESSAGE (OUTPATIENT)
Dept: INTERNAL MEDICINE | Facility: CLINIC | Age: 78
End: 2018-09-17

## 2018-09-18 ENCOUNTER — TELEPHONE (OUTPATIENT)
Dept: INTERNAL MEDICINE | Facility: CLINIC | Age: 78
End: 2018-09-18

## 2018-09-18 NOTE — TELEPHONE ENCOUNTER
----- Message from Fadia Gann sent at 9/17/2018  5:35 PM CDT -----  Contact: PT Portal REquest  Appointment Request From: Herber Camarillo    With Provider: Fidelina Loyola MD [[facility] - Internal Medicine]    Preferred Date Range: 1/12/2019 - 1/14/2019    Preferred Times: Any time    Reason for visit: Dr. Goldsmith    Comments:  10 am.

## 2018-09-24 ENCOUNTER — CLINICAL SUPPORT (OUTPATIENT)
Dept: FAMILY MEDICINE | Facility: CLINIC | Age: 78
End: 2018-09-24
Payer: MEDICARE

## 2018-09-24 DIAGNOSIS — Z23 FLU VACCINE NEED: Primary | ICD-10-CM

## 2018-09-24 PROCEDURE — G0008 ADMIN INFLUENZA VIRUS VAC: HCPCS | Mod: ,,, | Performed by: FAMILY MEDICINE

## 2018-09-24 PROCEDURE — 99999 PR PBB SHADOW E&M-EST. PATIENT-LVL I: CPT | Mod: PBBFAC,,,

## 2018-09-24 PROCEDURE — 99211 OFF/OP EST MAY X REQ PHY/QHP: CPT | Mod: PBBFAC,PN

## 2018-09-24 PROCEDURE — G0008 ADMIN INFLUENZA VIRUS VAC: HCPCS | Mod: PBBFAC

## 2018-09-24 PROCEDURE — 90662 IIV NO PRSV INCREASED AG IM: CPT | Mod: PBBFAC,PN

## 2018-10-05 ENCOUNTER — PATIENT MESSAGE (OUTPATIENT)
Dept: CARDIOLOGY | Facility: CLINIC | Age: 78
End: 2018-10-05

## 2018-10-05 RX ORDER — METOPROLOL SUCCINATE 25 MG/1
25 TABLET, EXTENDED RELEASE ORAL DAILY
Qty: 90 TABLET | Refills: 3 | Status: SHIPPED | OUTPATIENT
Start: 2018-10-05 | End: 2019-07-01 | Stop reason: SDUPTHER

## 2018-10-11 ENCOUNTER — PES CALL (OUTPATIENT)
Dept: ADMINISTRATIVE | Facility: CLINIC | Age: 78
End: 2018-10-11

## 2018-11-30 ENCOUNTER — OFFICE VISIT (OUTPATIENT)
Dept: PODIATRY | Facility: CLINIC | Age: 78
End: 2018-11-30
Payer: MEDICARE

## 2018-11-30 VITALS
HEART RATE: 65 BPM | WEIGHT: 221 LBS | SYSTOLIC BLOOD PRESSURE: 130 MMHG | BODY MASS INDEX: 31.64 KG/M2 | HEIGHT: 70 IN | DIASTOLIC BLOOD PRESSURE: 78 MMHG

## 2018-11-30 DIAGNOSIS — M77.52 BURSITIS OF LEFT FOOT: Primary | ICD-10-CM

## 2018-11-30 PROCEDURE — 99999 PR PBB SHADOW E&M-EST. PATIENT-LVL III: CPT | Mod: PBBFAC,,, | Performed by: PODIATRIST

## 2018-11-30 PROCEDURE — 99203 OFFICE O/P NEW LOW 30 MIN: CPT | Mod: 25,S$GLB,, | Performed by: PODIATRIST

## 2018-11-30 PROCEDURE — 1101F PT FALLS ASSESS-DOCD LE1/YR: CPT | Mod: CPTII,S$GLB,, | Performed by: PODIATRIST

## 2018-11-30 PROCEDURE — 20605 DRAIN/INJ JOINT/BURSA W/O US: CPT | Mod: LT,S$GLB,, | Performed by: PODIATRIST

## 2018-11-30 RX ADMIN — DEXAMETHASONE SODIUM PHOSPHATE 4 MG: 4 INJECTION, SOLUTION INTRA-ARTICULAR; INTRALESIONAL; INTRAMUSCULAR; INTRAVENOUS; SOFT TISSUE at 05:11

## 2018-12-04 RX ORDER — DEXAMETHASONE SODIUM PHOSPHATE 4 MG/ML
4 INJECTION, SOLUTION INTRA-ARTICULAR; INTRALESIONAL; INTRAMUSCULAR; INTRAVENOUS; SOFT TISSUE ONCE
Status: COMPLETED | OUTPATIENT
Start: 2018-12-07 | End: 2018-11-30

## 2018-12-04 NOTE — PROGRESS NOTES
Subjective:      Patient ID: Herber Camarillo is a 78 y.o. male.    Chief Complaint: Foot Pain (lt foot)    Herber is a 78 y.o. male who presents to the podiatry clinic  with complaint of  left foot pain. Onset of the symptoms was several days ago. Precipitating event: none known. Current symptoms include: ability to bear weight, but with some pain. Aggravating factors: any weight bearing and shoes. Symptoms have gradually worsened. Patient has had no prior foot problems. Evaluation to date: none. Treatment to date: none. Patients rates pain 6/10 on pain scale.        Review of Systems   Constitution: Negative for chills, fever and malaise/fatigue.   HENT: Negative for hearing loss.    Cardiovascular: Negative for claudication.   Respiratory: Negative for shortness of breath.    Skin: Negative for flushing and rash.   Musculoskeletal: Positive for joint pain. Negative for myalgias.   Neurological: Negative for loss of balance, numbness, paresthesias and sensory change.   Psychiatric/Behavioral: Negative for altered mental status.           Objective:      Physical Exam   Constitutional: He is oriented to person, place, and time. He appears well-developed and well-nourished.   Cardiovascular:   Pulses:       Dorsalis pedis pulses are 2+ on the right side, and 2+ on the left side.        Posterior tibial pulses are 2+ on the right side, and 2+ on the left side.   no edema noted to b/L LEs   Musculoskeletal:        Right knee: He exhibits no swelling and no ecchymosis.        Left knee: He exhibits no swelling and no ecchymosis.        Right ankle: He exhibits normal range of motion, no swelling, no ecchymosis and normal pulse. No lateral malleolus, no medial malleolus and no head of 5th metatarsal tenderness found. Achilles tendon exhibits no pain, no defect and normal Teixeira's test results.        Left ankle: He exhibits normal range of motion, no swelling, no ecchymosis and normal pulse. No lateral malleolus, no medial  malleolus and no head of 5th metatarsal tenderness found. Achilles tendon exhibits no pain and normal Teixeira's test results.        Right lower leg: He exhibits no tenderness, no bony tenderness, no swelling, no edema and no deformity.        Left lower leg: He exhibits no tenderness, no swelling and no edema.        Right foot: There is normal range of motion and no deformity.        Left foot: There is normal range of motion and no deformity.   Prominent 5th met head/Tailor's bunion deformity noted to left lower extremities with fluctuance      Feet:   Right Foot:   Protective Sensation: 5 sites tested. 5 sites sensed.   Left Foot:   Protective Sensation: 5 sites tested. 5 sites sensed.   Neurological: He is alert and oriented to person, place, and time.   Gross sensation intact to b/L lower extremities   Skin: Skin is warm. Capillary refill takes more than 3 seconds. No abrasion, no bruising, no burn and no ecchymosis noted.   No open lesions noted to b/L lower extremities.      Psychiatric: He has a normal mood and affect. His speech is normal and behavior is normal. He is attentive.             Assessment:       Encounter Diagnosis   Name Primary?    Bursitis of left foot Yes         Plan:       Herber was seen today for foot pain.    Diagnoses and all orders for this visit:    Bursitis of left foot    Other orders  -     dexamethasone injection 4 mg      I counseled the patient on his conditions, their implications and medical management.      Pt advised that the has a small bursa surrounding his tailor's bunion.   Shoe modification advised for the pt. Pt was advised to obtain shoes will accommodate foot deformities.   Pt advised on RICE and OTC NSAIDs for associated pain.     Injection consisting of a 1:1 mixture of 1% lidocaine plain and dexamethasone given into the left 5th met head bursa . Pt tolerated injection well and voiced relief afterwards.   Call or return to clinic prn if these symptoms worsen or  fail to improve as anticipated.    .

## 2018-12-18 ENCOUNTER — PES CALL (OUTPATIENT)
Dept: ADMINISTRATIVE | Facility: CLINIC | Age: 78
End: 2018-12-18

## 2019-01-15 ENCOUNTER — HOSPITAL ENCOUNTER (OUTPATIENT)
Dept: RADIOLOGY | Facility: HOSPITAL | Age: 79
Discharge: HOME | End: 2019-01-15
Attending: INTERNAL MEDICINE
Payer: MEDICARE

## 2019-01-15 ENCOUNTER — OFFICE VISIT (OUTPATIENT)
Dept: INTERNAL MEDICINE | Facility: CLINIC | Age: 79
End: 2019-01-15
Payer: MEDICARE

## 2019-01-15 ENCOUNTER — PATIENT MESSAGE (OUTPATIENT)
Dept: INTERNAL MEDICINE | Facility: CLINIC | Age: 79
End: 2019-01-15

## 2019-01-15 VITALS
WEIGHT: 218.25 LBS | BODY MASS INDEX: 32.33 KG/M2 | HEIGHT: 69 IN | DIASTOLIC BLOOD PRESSURE: 80 MMHG | SYSTOLIC BLOOD PRESSURE: 120 MMHG

## 2019-01-15 DIAGNOSIS — I25.10 CORONARY ARTERY DISEASE INVOLVING NATIVE CORONARY ARTERY OF NATIVE HEART WITHOUT ANGINA PECTORIS: ICD-10-CM

## 2019-01-15 DIAGNOSIS — C61 PROSTATE CANCER: ICD-10-CM

## 2019-01-15 DIAGNOSIS — M25.551 PAIN OF RIGHT HIP JOINT: ICD-10-CM

## 2019-01-15 DIAGNOSIS — R10.9 CHRONIC RIGHT FLANK PAIN: ICD-10-CM

## 2019-01-15 DIAGNOSIS — E66.09 NON MORBID OBESITY DUE TO EXCESS CALORIES: ICD-10-CM

## 2019-01-15 DIAGNOSIS — E79.89 URIC ACID CRYSTALLOPATHY: ICD-10-CM

## 2019-01-15 DIAGNOSIS — I70.0 ATHEROSCLEROSIS OF AORTA: ICD-10-CM

## 2019-01-15 DIAGNOSIS — M54.9 BACK PAIN, UNSPECIFIED BACK LOCATION, UNSPECIFIED BACK PAIN LATERALITY, UNSPECIFIED CHRONICITY: ICD-10-CM

## 2019-01-15 DIAGNOSIS — Z12.11 COLON CANCER SCREENING: ICD-10-CM

## 2019-01-15 DIAGNOSIS — R91.1 LUNG NODULE, SOLITARY: ICD-10-CM

## 2019-01-15 DIAGNOSIS — E78.5 HYPERLIPIDEMIA, UNSPECIFIED HYPERLIPIDEMIA TYPE: ICD-10-CM

## 2019-01-15 DIAGNOSIS — G89.29 CHRONIC RIGHT FLANK PAIN: ICD-10-CM

## 2019-01-15 DIAGNOSIS — Z00.00 ANNUAL PHYSICAL EXAM: Primary | ICD-10-CM

## 2019-01-15 LAB
BILIRUB UR QL STRIP: NEGATIVE
CLARITY UR REFRACT.AUTO: CLEAR
COLOR UR AUTO: YELLOW
GLUCOSE UR QL STRIP: NEGATIVE
HGB UR QL STRIP: NEGATIVE
KETONES UR QL STRIP: NEGATIVE
LEUKOCYTE ESTERASE UR QL STRIP: NEGATIVE
NITRITE UR QL STRIP: NEGATIVE
PH UR STRIP: 5 [PH] (ref 5–8)
PROT UR QL STRIP: NEGATIVE
SP GR UR STRIP: 1.02 (ref 1–1.03)
URN SPEC COLLECT METH UR: NORMAL

## 2019-01-15 PROCEDURE — 99499 RISK ADDL DX/OHS AUDIT: ICD-10-PCS | Mod: HCNC,S$GLB,, | Performed by: INTERNAL MEDICINE

## 2019-01-15 PROCEDURE — 73502 X-RAY EXAM HIP UNI 2-3 VIEWS: CPT | Mod: TC,RT

## 2019-01-15 PROCEDURE — 72070 X-RAY EXAM THORAC SPINE 2VWS: CPT | Mod: 26,,, | Performed by: RADIOLOGY

## 2019-01-15 PROCEDURE — 71100 X-RAY EXAM RIBS UNI 2 VIEWS: CPT | Mod: TC,RT

## 2019-01-15 PROCEDURE — 71100 XR RIBS 2 VIEW RIGHT: ICD-10-PCS | Mod: 26,RT,, | Performed by: RADIOLOGY

## 2019-01-15 PROCEDURE — 99499 UNLISTED E&M SERVICE: CPT | Mod: HCNC,S$GLB,, | Performed by: INTERNAL MEDICINE

## 2019-01-15 PROCEDURE — 72070 XR THORACIC SPINE AP LATERAL: ICD-10-PCS | Mod: 26,,, | Performed by: RADIOLOGY

## 2019-01-15 PROCEDURE — 99397 PR PREVENTIVE VISIT,EST,65 & OVER: ICD-10-PCS | Mod: S$GLB,,, | Performed by: INTERNAL MEDICINE

## 2019-01-15 PROCEDURE — 72100 X-RAY EXAM L-S SPINE 2/3 VWS: CPT | Mod: TC

## 2019-01-15 PROCEDURE — 71100 X-RAY EXAM RIBS UNI 2 VIEWS: CPT | Mod: 26,RT,, | Performed by: RADIOLOGY

## 2019-01-15 PROCEDURE — 72100 X-RAY EXAM L-S SPINE 2/3 VWS: CPT | Mod: 26,,, | Performed by: RADIOLOGY

## 2019-01-15 PROCEDURE — 73502 XR HIP 2 VIEW RIGHT: ICD-10-PCS | Mod: 26,RT,, | Performed by: RADIOLOGY

## 2019-01-15 PROCEDURE — 99397 PER PM REEVAL EST PAT 65+ YR: CPT | Mod: S$GLB,,, | Performed by: INTERNAL MEDICINE

## 2019-01-15 PROCEDURE — 81003 URINALYSIS AUTO W/O SCOPE: CPT

## 2019-01-15 PROCEDURE — 99999 PR PBB SHADOW E&M-EST. PATIENT-LVL V: CPT | Mod: PBBFAC,,, | Performed by: INTERNAL MEDICINE

## 2019-01-15 PROCEDURE — 99999 PR PBB SHADOW E&M-EST. PATIENT-LVL V: ICD-10-PCS | Mod: PBBFAC,,, | Performed by: INTERNAL MEDICINE

## 2019-01-15 PROCEDURE — 72100 XR LUMBAR SPINE AP AND LATERAL: ICD-10-PCS | Mod: 26,,, | Performed by: RADIOLOGY

## 2019-01-15 PROCEDURE — 73502 X-RAY EXAM HIP UNI 2-3 VIEWS: CPT | Mod: 26,RT,, | Performed by: RADIOLOGY

## 2019-01-15 PROCEDURE — 72070 X-RAY EXAM THORAC SPINE 2VWS: CPT | Mod: TC

## 2019-01-15 RX ORDER — CELECOXIB 100 MG/1
100 CAPSULE ORAL 2 TIMES DAILY PRN
Qty: 60 CAPSULE | Refills: 0 | Status: SHIPPED | OUTPATIENT
Start: 2019-01-15 | End: 2019-04-03

## 2019-01-15 NOTE — PROGRESS NOTES
"Subjective:       Patient ID: Herber Camarillo is a 78 y.o. male.    Chief Complaint: Annual Exam    Per patient, "annual" again- just seen for same in June 2018.     Here with wife.     Some DJD changes,knee primarily.   Also some ongoing back sx.   We have done several xrays including rib xrays and a bone scan, abdominal u/s unrevealing.  Continued pain along the margin of the ribs on the right, in back to mid axillary area.  Never smoked.      Prostate cancer discharged from Urology 2011.  IFG, CAD, OA.  Lung nodule .7 7/15, stable 12/16.  Uric acid .  Obesity.  ASCVD- [facility] in Cardiology.   R THR;  cholecystectomy    Has seen Ortho and gotten shots for his knees.     Labs acceptable; all reviewed.    Patient Active Problem List:     Uric acid crystallopathy     Hyperlipidemia     Prostate cancer: discharged from the urology clinic in 2011.     Diastolic dysfunction     Atherosclerosis of aorta: see CT scan 7/15     Lung nodule, solitary .7 cm 7/15; stable 12/16     Coronary artery disease involving native coronary artery of native heart without angina pectoris     Non morbid obesity due to excess calories     Primary osteoarthritis of right hip        Review of Systems   Constitutional: Negative for activity change and unexpected weight change.   HENT: Negative for hearing loss, rhinorrhea and trouble swallowing.    Eyes: Negative for discharge and visual disturbance.   Respiratory: Negative for chest tightness and wheezing.    Cardiovascular: Negative for chest pain and palpitations.   Gastrointestinal: Negative for blood in stool, constipation, diarrhea and vomiting.   Endocrine: Negative for polydipsia and polyuria.   Genitourinary: Negative for difficulty urinating, hematuria and urgency.   Musculoskeletal: Positive for arthralgias and back pain. Negative for joint swelling and neck pain.   Neurological: Negative for weakness and headaches.   Psychiatric/Behavioral: Negative for confusion and dysphoric mood.     "   Objective:      Physical Exam   Constitutional: He is oriented to person, place, and time. He appears well-developed and well-nourished.   HENT:   Head: Normocephalic and atraumatic.   Right Ear: External ear normal.   Left Ear: External ear normal.   Eyes: Conjunctivae and EOM are normal.   Neck: Normal range of motion. Neck supple. No thyromegaly present.   Cardiovascular: Normal rate and regular rhythm.   No murmur heard.  Pulmonary/Chest: Effort normal and breath sounds normal. No respiratory distress. He has no wheezes.   Abdominal: Soft. He exhibits no distension. There is no tenderness.   Obese   Musculoskeletal: He exhibits no edema or tenderness.   No pain over cervical, thoracic or lumbar spine  Points to ribs as source of pain although not painful now   Lymphadenopathy:     He has no cervical adenopathy.   Neurological: He is alert and oriented to person, place, and time. No cranial nerve deficit.   Skin: Skin is warm and dry.   Psychiatric: He has a normal mood and affect. His behavior is normal.       Assessment:       1. Annual physical exam    2. Lung nodule, solitary .7 cm 7/15; stable 12/16    3. Atherosclerosis of aorta: see CT scan 7/15    4. Coronary artery disease involving native coronary artery of native heart without angina pectoris    5. Hyperlipidemia, unspecified hyperlipidemia type    6. Non morbid obesity due to excess calories    7. Prostate cancer: discharged from the urology clinic in 2011.    8. Uric acid crystallopathy    9. Back pain, unspecified back location, unspecified back pain laterality, unspecified chronicity    10. Pain of right hip joint    11. Colon cancer screening    12. Chronic right flank pain        Plan:         Herber was seen today for annual exam.    Diagnoses and all orders for this visit:    Annual physical exam    Lung nodule, solitary .7 cm 7/15; stable 12/16  -     CT Chest Without Contrast    Atherosclerosis of aorta: see CT scan 7/15: continue regimen;  Cardiology follow up    Coronary artery disease involving native coronary artery of native heart without angina pectoris: continue regimen; Cardiology follow up    Hyperlipidemia, unspecified hyperlipidemia type: continue regimen; Cardiology follow up    Non morbid obesity due to excess calories:   He is obese.  I recommended exercise, diet and attempt at a 10-15 # weight loss in the next year. He declines dietician appointment.    Prostate cancer: discharged from the urology clinic in 2011.   PSA acceptable 1/19    Uric acid crystallopathy: stable on regimen    Back pain, unspecified back location, unspecified back pain laterality, unspecified chronicity  -     X-Ray Thoracic Spine AP Lateral; Future  -     X-Ray Lumbar Spine Ap And Lateral  -     X-Ray Ribs 2 View Right    Pain of right hip joint  -     X-Ray Hip 2 or 3 views Right; Future    Colon cancer screening  -     Fecal Immunochemical Test (iFOBT); Future    Chronic right flank pain  -     CT Chest Without Contrast  -     Urinalysis    Other orders  -     celecoxib (CELEBREX) 100 MG capsule; Take 1 capsule (100 mg total) by mouth 2 (two) times daily as needed for Pain.    Declines colonoscopy  I will review all studies and determine further tx depending on findings  Shingrix when available

## 2019-01-15 NOTE — PATIENT INSTRUCTIONS
Prevention Guidelines, Men Ages 65 and Older  Screening tests and vaccines are an important part of managing your health. Health counseling is essential, too. Below are guidelines for these, for men ages 65 and older. Talk with your healthcare provider to make sure youre up-to-date on what you need.  Screening Who needs it How often   Abdominal aortic aneurysm Men ages 65 to 75 who have ever smoked 1 ultrasound   Alcohol misuse All men in this age group At routine exams   Blood pressure All men in this age group Every 2 years if your blood pressure is less than 120/80 mm Hg; yearly if your systolic blood pressure is 120 to 139 mm Hg, or your diastolic blood pressure reading is 80 to 89 mm Hg   Colorectal cancer All men in this age group Flexible sigmoidoscopy every 5 years, or colonoscopy every 10 years, or double-contrast barium enema every 5 years; yearly fecal occult blood test or fecal immunochemical test; or a stool DNA test as often as your healthcare provider advises; talk with your healthcare provider about which tests are best for you and when you no longer need colonoscopies (generally after age 75)   Depression All men in this age group At routine exams   Type 2 diabetes or prediabetes All adults beginning at age 45 and adults without symptoms at any age who are overweight or obese and have 1 or more other risk factors for diabetes At least every 3 years (yearly if your blood sugar has already begun to rise)   Hepatitis C Men at increased risk for infection - talk with your healthcare provider At routine exams   High cholesterol or triglycerides All men in this age group At least every 5 years   HIV Men at increased risk for infection - talk with your healthcare provider At routine exams   Lung cancer Adults ages 55 to 80 who have smoked Yearly screening in smokers with 30 pack-year history of smoking or who quit within 15 years   Obesity All men in this age group At routine exams   Prostate cancer All  men in this age group, talk to healthcare provider about risks and benefits of digital rectal exam (BRONWYN) and prostate-specific antigen (PSA) screening1 At routine exams   Syphilis Men at increased risk for infection - talk with your healthcare provider At routine exams   Tuberculosis Men at increased risk for infection - talk with your healthcare provider Ask your healthcare provider   Vision All men in this age group Every 1 to 2 years; if you have a chronic health condition, ask your healthcare provider if you needs exams more often   Vaccine Who needs it How often   Chickenpox (varicella) All men in this age group who have no record of this infection or vaccine 2 doses; second dose should be given at least 4 weeks after the first dose   Hepatitis A Men at increased risk for infection - talk with your healthcare provider 2 doses given at least 6 months apart   Hepatitis B Men at increased risk for infection - talk with your healthcare provider 3 doses over 6 months; second dose should be given 1 month after the first dose; the third dose should be given at least 2 months after the second dose and at least 4 months after the first dose   Haemophilus influenzae Type B (HIB) Men at increased risk for infection - talk with your healthcare provider 1 to 3 doses   Influenza (flu) All men in this age group  Once a year   Meningococcal Men at increased risk for infection - talk with your healthcare provider 1 or more doses   Pneumococcal conjugate vaccine (PCV13) and pneumococcal polysaccharide vaccine (PPSV23) All men in this age group 1 dose of each vaccine   Tetanus/diphtheria/  pertussis (Td/Tdap) booster All men in this age group Td every 10 years, or Tdap if you will have contact with a child younger than 12 months old   Zoster All men in this age group 1 dose   Counseling Who needs it How often   Diet and exercise Men who are overweight or obese When diagnosed, and then at routine exams   Fall prevention (exercise,  vitamin D supplements) All men in this age group At routine exams   Sexually transmitted infection Men at increased risk for infection - talk with your healthcare provider At routine exams   Use of daily aspirin Men ages 45 to 79 at risk for cardiovascular health problems At routine exams   Use of tobacco and the health effects it can cause All men in this age group Every visit   [address] [place] [address] [facility]   Date Last Reviewed: 2/1/2017  © 2790-9730 [employer]. [address] [place] [address], [place] [address]. All rights reserved. This information is not intended as a substitute for professional medical care. Always follow your healthcare professional's instructions.    [employer]

## 2019-01-16 ENCOUNTER — PATIENT MESSAGE (OUTPATIENT)
Dept: INTERNAL MEDICINE | Facility: CLINIC | Age: 79
End: 2019-01-16

## 2019-01-18 ENCOUNTER — LAB VISIT (OUTPATIENT)
Dept: LAB | Facility: HOSPITAL | Age: 79
End: 2019-01-18
Attending: INTERNAL MEDICINE
Payer: MEDICARE

## 2019-01-18 ENCOUNTER — PATIENT MESSAGE (OUTPATIENT)
Dept: INTERNAL MEDICINE | Facility: CLINIC | Age: 79
End: 2019-01-18

## 2019-01-18 DIAGNOSIS — Z12.11 COLON CANCER SCREENING: ICD-10-CM

## 2019-01-18 LAB — HEMOCCULT STL QL IA: NEGATIVE

## 2019-01-18 PROCEDURE — 82274 ASSAY TEST FOR BLOOD FECAL: CPT

## 2019-01-25 ENCOUNTER — TELEPHONE (OUTPATIENT)
Dept: INTERNAL MEDICINE | Facility: CLINIC | Age: 79
End: 2019-01-25

## 2019-01-29 ENCOUNTER — PATIENT MESSAGE (OUTPATIENT)
Dept: INTERNAL MEDICINE | Facility: CLINIC | Age: 79
End: 2019-01-29

## 2019-02-13 ENCOUNTER — PATIENT MESSAGE (OUTPATIENT)
Dept: INTERNAL MEDICINE | Facility: CLINIC | Age: 79
End: 2019-02-13

## 2019-02-13 ENCOUNTER — HOSPITAL ENCOUNTER (OUTPATIENT)
Dept: RADIOLOGY | Facility: HOSPITAL | Age: 79
Discharge: HOME | End: 2019-02-13
Attending: INTERNAL MEDICINE
Payer: MEDICARE

## 2019-02-13 PROCEDURE — 71250 CT CHEST WITHOUT CONTRAST: ICD-10-PCS | Mod: 26,HCNC,, | Performed by: RADIOLOGY

## 2019-02-13 PROCEDURE — 71250 CT THORAX DX C-: CPT | Mod: TC,HCNC

## 2019-02-13 PROCEDURE — 71250 CT THORAX DX C-: CPT | Mod: 26,HCNC,, | Performed by: RADIOLOGY

## 2019-03-04 ENCOUNTER — OFFICE VISIT (OUTPATIENT)
Dept: DERMATOLOGY | Facility: CLINIC | Age: 79
End: 2019-03-04
Payer: MEDICARE

## 2019-03-04 DIAGNOSIS — L82.1 SEBORRHEIC KERATOSES: ICD-10-CM

## 2019-03-04 DIAGNOSIS — D48.9 NEOPLASM OF UNCERTAIN BEHAVIOR: ICD-10-CM

## 2019-03-04 DIAGNOSIS — R22.9 SUBCUTANEOUS NODULE: Primary | ICD-10-CM

## 2019-03-04 DIAGNOSIS — D22.9 MULTIPLE BENIGN NEVI: ICD-10-CM

## 2019-03-04 DIAGNOSIS — L21.9 SEBORRHEIC DERMATITIS: ICD-10-CM

## 2019-03-04 DIAGNOSIS — Z12.83 SCREENING EXAM FOR SKIN CANCER: ICD-10-CM

## 2019-03-04 PROCEDURE — 1101F PR PT FALLS ASSESS DOC 0-1 FALLS W/OUT INJ PAST YR: ICD-10-PCS | Mod: HCNC,CPTII,S$GLB, | Performed by: DERMATOLOGY

## 2019-03-04 PROCEDURE — 99213 OFFICE O/P EST LOW 20 MIN: CPT | Mod: HCNC,S$GLB,, | Performed by: DERMATOLOGY

## 2019-03-04 PROCEDURE — 99213 PR OFFICE/OUTPT VISIT, EST, LEVL III, 20-29 MIN: ICD-10-PCS | Mod: HCNC,S$GLB,, | Performed by: DERMATOLOGY

## 2019-03-04 PROCEDURE — 99999 PR PBB SHADOW E&M-EST. PATIENT-LVL III: ICD-10-PCS | Mod: PBBFAC,HCNC,, | Performed by: DERMATOLOGY

## 2019-03-04 PROCEDURE — 1101F PT FALLS ASSESS-DOCD LE1/YR: CPT | Mod: HCNC,CPTII,S$GLB, | Performed by: DERMATOLOGY

## 2019-03-04 PROCEDURE — 99999 PR PBB SHADOW E&M-EST. PATIENT-LVL III: CPT | Mod: PBBFAC,HCNC,, | Performed by: DERMATOLOGY

## 2019-03-04 RX ORDER — CLOBETASOL PROPIONATE 0.5 MG/G
AEROSOL, FOAM TOPICAL 2 TIMES DAILY
Qty: 100 G | Refills: 1 | Status: SHIPPED | OUTPATIENT
Start: 2019-03-04 | End: 2019-07-26

## 2019-03-04 NOTE — PROGRESS NOTES
Subjective:       Patient ID:  Herber Camarillo is a 78 y.o. male who presents for   Chief Complaint   Patient presents with    Lesion     79 yo man here for evaluation of lesion on left cheek for the last year.  Complains that it feels rough and is pruritic.  No previous treatment.    Also endorses a bump on left lateral nose that has been present for years. Asymptomatic. Not enlarging.   Lesion on left lateral periorbital rim.  Asymptomatic. No prior treatment. Unsure how long it has been present.   Denies history of skin cancer.   Has bump right chin x 5 years, not sure if it is growing. No hx of any oral malignancy. Hx of prostate cancer and lung nodule.            Lesion  - Initial  Affected locations: left cheek  Duration: 1 year  Signs / symptoms: itching and rough  Severity: moderate  Timing: constant  Aggravated by: nothing  Relieving factors/Treatments tried: nothing  Improvement on treatment: no relief        Review of Systems   Constitutional: Negative for fever, chills, fatigue and malaise.   Skin: Positive for wears hat. Negative for daily sunscreen use and recent sunburn.   Hematologic/Lymphatic: Bruises/bleeds easily.        Objective:    Physical Exam   Constitutional: He appears well-developed and well-nourished.   Neurological: He is alert and oriented to person, place, and time.   Psychiatric: He has a normal mood and affect.   Skin:   Areas Examined (abnormalities noted in diagram):   Scalp / Hair Palpated and Inspected  Head / Face Inspection Performed  Neck Inspection Performed  Chest / Axilla Inspection Performed  Abdomen Inspection Performed  Back Inspection Performed  RUE Inspected  LUE Inspection Performed                   Diagram Legend     Erythematous scaling macule/papule c/w actinic keratosis       Vascular papule c/w angioma      Pigmented verrucoid papule/plaque c/w seborrheic keratosis      Yellow umbilicated papule c/w sebaceous hyperplasia      Irregularly shaped tan macule c/w  lentigo     1-2 mm smooth white papules consistent with Milia      Movable subcutaneous cyst with punctum c/w epidermal inclusion cyst      Subcutaneous movable cyst c/w pilar cyst      Firm pink to brown papule c/w dermatofibroma      Pedunculated fleshy papule(s) c/w skin tag(s)      Evenly pigmented macule c/w junctional nevus     Mildly variegated pigmented, slightly irregular-bordered macule c/w mildly atypical nevus      Flesh colored to evenly pigmented papule c/w intradermal nevus       Pink pearly papule/plaque c/w basal cell carcinoma      Erythematous hyperkeratotic cursted plaque c/w SCC      Surgical scar with no sign of skin cancer recurrence      Open and closed comedones      Inflammatory papules and pustules      Verrucoid papule consistent consistent with wart     Erythematous eczematous patches and plaques     Dystrophic onycholytic nail with subungual debris c/w onychomycosis     Umbilicated papule    Erythematous-base heme-crusted tan verrucoid plaque consistent with inflamed seborrheic keratosis     Erythematous Silvery Scaling Plaque c/w Psoriasis     See annotation      Assessment / Plan:        Subcutaneous nodule - right submental area - likely cyst but given area would advise removal to ensure not lymph node -  -     Ambulatory Referral to ENT    Seborrheic dermatitis - scalp   Continue Tgel and OTC selsun blue  -     clobetasol (OLUX) 0.05 % Foam; Apply topically 2 (two) times daily. For itching in scalp and ears  Dispense: 100 g; Refill: 1    Screening exam for skin cancer  Upper body skin examination performed today including at least 6 points as noted in physical examination.lesions suspicious for malignancy noted.    Multiple benign nevi  Discussed ABCDE's of nevi.  Monitor for new mole or moles that are becoming bigger, darker, irritated, or developing irregular borders.     Neoplasm of uncertain behavior  - left lateral canthus - yellow pink papule  I advised biopsy today patient  declines  He will return if still present after several weeks  Discouraged him from picking this area as that could make worse    Seborrheic keratosis left cheek - pt area of concern  These are benign inherited growths without a malignant potential. Reassurance given to patient. No treatment is necessary.              Follow-up in about 4 weeks (around 4/1/2019), or if symptoms worsen or fail to improve.

## 2019-03-19 RX ORDER — ATORVASTATIN CALCIUM 20 MG/1
TABLET, FILM COATED ORAL
Qty: 90 TABLET | Refills: 3 | Status: SHIPPED | OUTPATIENT
Start: 2019-03-19 | End: 2019-07-01 | Stop reason: SDUPTHER

## 2019-04-03 ENCOUNTER — OFFICE VISIT (OUTPATIENT)
Dept: PAIN MEDICINE | Facility: CLINIC | Age: 79
End: 2019-04-03
Payer: MEDICARE

## 2019-04-03 VITALS — SYSTOLIC BLOOD PRESSURE: 116 MMHG | BODY MASS INDEX: 31.9 KG/M2 | DIASTOLIC BLOOD PRESSURE: 74 MMHG | WEIGHT: 216 LBS

## 2019-04-03 DIAGNOSIS — M79.662 PAIN IN BOTH LOWER LEGS: Primary | ICD-10-CM

## 2019-04-03 DIAGNOSIS — M79.661 PAIN IN BOTH LOWER LEGS: Primary | ICD-10-CM

## 2019-04-03 DIAGNOSIS — M79.18 MYOFASCIAL MUSCLE PAIN: ICD-10-CM

## 2019-04-03 PROCEDURE — 99999 PR PBB SHADOW E&M-EST. PATIENT-LVL III: ICD-10-PCS | Mod: PBBFAC,HCNC,, | Performed by: ANESTHESIOLOGY

## 2019-04-03 PROCEDURE — 99999 PR PBB SHADOW E&M-EST. PATIENT-LVL III: CPT | Mod: PBBFAC,HCNC,, | Performed by: ANESTHESIOLOGY

## 2019-04-03 PROCEDURE — 99214 PR OFFICE/OUTPT VISIT, EST, LEVL IV, 30-39 MIN: ICD-10-PCS | Mod: HCNC,S$GLB,, | Performed by: ANESTHESIOLOGY

## 2019-04-03 PROCEDURE — 99214 OFFICE O/P EST MOD 30 MIN: CPT | Mod: HCNC,S$GLB,, | Performed by: ANESTHESIOLOGY

## 2019-04-03 PROCEDURE — 99499 UNLISTED E&M SERVICE: CPT | Mod: HCNC,S$GLB,, | Performed by: ANESTHESIOLOGY

## 2019-04-03 PROCEDURE — 1101F PR PT FALLS ASSESS DOC 0-1 FALLS W/OUT INJ PAST YR: ICD-10-PCS | Mod: HCNC,CPTII,S$GLB, | Performed by: ANESTHESIOLOGY

## 2019-04-03 PROCEDURE — 99499 RISK ADDL DX/OHS AUDIT: ICD-10-PCS | Mod: HCNC,S$GLB,, | Performed by: ANESTHESIOLOGY

## 2019-04-03 PROCEDURE — 1101F PT FALLS ASSESS-DOCD LE1/YR: CPT | Mod: HCNC,CPTII,S$GLB, | Performed by: ANESTHESIOLOGY

## 2019-04-03 RX ORDER — MELOXICAM 7.5 MG/1
7.5 TABLET ORAL DAILY PRN
Qty: 30 TABLET | Refills: 1 | Status: SHIPPED | OUTPATIENT
Start: 2019-04-03 | End: 2019-05-03

## 2019-04-03 NOTE — PATIENT INSTRUCTIONS
Quadriceps Stretch (Flexibility)    1. Stand up straight and hold onto a wall, sturdy chair, railing, or table with your right hand.  2. Bend your left leg at the knee behind you, and grab your ankle with your left hand. Pull your left heel toward your buttocks. Dont arch your back.  3. Hold for 30 to 60 seconds. Repeat 2 times.  4. Switch legs and repeat.  Date Last Reviewed: 5/1/2016  © 3465-0212 [employer]. [address] [place] [address]. All rights reserved. This information is not intended as a substitute for professional medical care. Always follow your healthcare professional's instructions.        Leg Muscle Stretches: Knee Flexion    The following flexibility exercise may be suggested by your therapist. Repeat as many times as instructed. Stop the exercise if it causes pain, and discuss it with your physical therapist or healthcare provider:  · Sit with your legs extended, foot flexed. Place a towel around one heel. Hold one end of the towel in each hand.  · Pull the towel toward you, sliding your heel toward your rear end. Keep your heel in contact with the mat. Be sure the pull is gentle and don't bounce.   · You should feel a stretch across the front of your knee. Hold for 10 to 15 seconds. Then slowly slide your foot back out.  · Repeat 5 times.  Date Last Reviewed: 10/15/2015  © 5199-4833 [employer]. [address] [place] [address]. All rights reserved. This information is not intended as a substitute for professional medical care. Always follow your healthcare professional's instructions.        Hamstring Stretch (with Towel)    To start, lie on your back with your knees bent and feet flat on the floor. Dont press your neck or lower back to the floor. Breathe deeply. You should feel comfortable and relaxed in this position.  · Put a towel behind one knee or calf.  · Use the towel to pull the leg toward your chest, keeping the leg straight or  slightly bent.  · Hold for 30-60 seconds. Then lower the leg.  · Repeat 2 times.  · Switch legs.   For your safety, check with your healthcare provider before starting an exercise program.   Date Last Reviewed: 8/16/2015  © 7087-8796 [employer]. [address] [place] [address]. All rights reserved. This information is not intended as a substitute for professional medical care. Always follow your healthcare professional's instructions.

## 2019-04-03 NOTE — PROGRESS NOTES
Chronic Pain - Established     SUBJECTIVE:    Herber Camarillo is a 79 y/o male with hx of Right LIBBY and CAD who presents to the clinic for the evaluation of leg pain. The pain started 4-6 weeks ago and symptoms have been persistent. No trauma or inciting event. The pain is located in the anterior and posterior aspect of the thighs and calves, bilaterally.  He denies low back pain. The leg pain is described as a stiffness and is rated as 8/10.   Symptoms interfere with daily activity. The pain is exacerbated by prolonged standing.  The pain is mitigated by heat and sitting in recliner. He has tried OTC Motrin with minimal improvement.    Patient denies bowel/bladder incontinence, significant motor weakness and loss of sensations.    Physical Therapy/Home Exercise: no      Pain Disability Index Review:  Last 3 PDI Scores 4/3/2019   Pain Disability Index (PDI) 26       Pain Medications:    - Aleve OTC as needed     report: Reviewed    Imaging:     RIGHT HIP X-RAY (1/2019):    FINDINGS:  There is a right LIBBY in place good alignment and no complication.    THORACIC X-RAY (1/2019):    FINDINGS:  Bones are fairly well mineralized.  Vertebral body heights and alignment is satisfactory.  Anterior osteophytes noted.  No compression fractures.    LUMBAR X-RAY (1/2019):    FINDINGS:  Bones are slightly demineralized.  Anterior osteophytes noted.  No compression fracture.  Facet arthropathy lower lumbar levels.  Vascular calcifications in the aorta.  Postop right hip replacement.    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     Past Surgical History:   Procedure Laterality Date    CARDIAC CATHETERIZATION  01/2000    CHOLECYSTECTOMY      FLUORO GUIDANCE FOR JOINTS/Intra-articular Right hip injection Right 9/23/2016    [facility]    HERNIA REPAIR      HIP SURGERY Right 01/11/2017    [facility]    PROSTATE SURGERY      REPLACEMENT-HIP-TOTAL WITH NAVIGATION Right 1/11/2017    [facility]    SHOULDER OPEN ROTATOR CUFF REPAIR      TONSILLECTOMY       Social History     Socioeconomic History    Marital status:      Spouse name: Not on file    Number of children: Not on file    Years of education: Not on file    Highest education level: Not on file   Occupational History    Occupation: Retired     Employer: RETIRED   Social Needs    Financial resource strain: Not on file    Food insecurity:     Worry: Not on file     Inability: Not on file    Transportation needs:     Medical: Not on file     Non-medical: Not on file   Tobacco Use    Smoking status: Never Smoker    Smokeless tobacco: Never Used   Substance and Sexual Activity    Alcohol use: No     Alcohol/week: 0.0 oz    Drug use: No    Sexual activity: Not on file   Lifestyle    Physical activity:     Days per week: Not on file     Minutes per session: Not on file    Stress: Not on file   Relationships    Social connections:     Talks on phone: Not on file     Gets together: Not on file     Attends Orthodoxy service: Not on file     Active member of club or organization: Not on file     Attends meetings of clubs or organizations: Not on file     Relationship status: Not on file   Other Topics Concern    Not on file   Social History Narrative    Originally from [place]; retired from [employer] in sales; distant cousin of former David Camarillo     Family History   Problem Relation Age of Onset    Heart disease Father      Cancer Sister         Lung    Heart disease Brother     Heart disease Brother     Heart disease Mother     Hyperlipidemia Daughter     No Known Problems Son     No Known Problems Daughter     Amblyopia Neg Hx     Blindness Neg Hx     Cataracts Neg Hx     Diabetes Neg Hx     Glaucoma Neg Hx     Hypertension Neg Hx     Macular degeneration Neg Hx     Retinal detachment Neg Hx     Strabismus Neg Hx     Stroke Neg Hx     Thyroid disease Neg Hx     Melanoma Neg Hx     Psoriasis Neg Hx     Lupus Neg Hx     Eczema Neg Hx     Acne Neg Hx     Asthma Neg Hx     Emphysema Neg Hx        Review of patient's allergies indicates:   Allergen Reactions    Novocain [procaine] Other (See Comments)     Other reaction(s): shortnes of breath       Current Outpatient Medications   Medication Sig    allopurinol (ZYLOPRIM) 300 MG tablet Take 1 tablet (300 mg total) by mouth once daily.    aspirin (ECOTRIN) 81 MG EC tablet Take 81 mg by mouth once daily.    atorvastatin (LIPITOR) 20 MG tablet TAKE 1 TABLET EVERY DAY    b complex vitamins tablet Take 1 tablet by mouth once daily.    metoprolol succinate (TOPROL-XL) 25 MG 24 hr tablet Take 1 tablet (25 mg total) by mouth once daily.    nitroGLYCERIN (NITROSTAT) 0.4 MG SL tablet Place 1 tablet (0.4 mg total) under the tongue every 5 (five) minutes as needed for Chest pain. ONE UNDER TONGUE FOR ANGINA MAY REPEAT IN 5 MIN IF IT STILL CONTINUES, CALL 911 AND TAKE AN ASPIRIN    omega-3 fatty acids-vitamin E (FISH OIL) 1,000 mg Cap Take by mouth.    clobetasol (OLUX) 0.05 % Foam Apply topically 2 (two) times daily. For itching in scalp and ears    meloxicam (MOBIC) 7.5 MG tablet Take 1 tablet (7.5 mg total) by mouth daily as needed for Pain (take with food).     No current facility-administered medications for this visit.        REVIEW OF SYSTEMS:  GENERAL: No weight loss, malaise or fevers.  HEENT: Negative for frequent or significant headaches.  RESPIRATORY:  Negative for wheezing or shortness of breath.  CARDIOVASCULAR: Negative for chest pain or palpitations.  GI: No blood in stools or black stools or change in bowel habits.  : Negative for kidney stones, urinary tract infections, or incontinence.  MUSCULOSKELETAL: See HPI  SKIN: Negative for rash or itching.  PSYCH: Negative for sleep disturbance, mood disorder and recent psychosocial stressors.    HEMATOLOGY/LYMPHOLOGY Negative for prolonged bleeding, bruising easily or swollen nodes.  NEURO:  No history of seizures or tremors.    OBJECTIVE:    /74   Wt 98 kg (216 lb)   BMI 31.90 kg/m²     PHYSICAL EXAMINATION:  GENERAL: Well appearing, in no acute distress.  PSYCH:  Mood and affect is appropriate.  Awake, alert, and oriented x 3.  SKIN: Skin color, texture, turgor normal, no rashes or lesions  HEENT: Normocephalic, atraumatic.  EOM intact.  CV: Posterior tibial pulses are 2+.  RESP:  Respirations are unlabored.  MSK:  No atrophy or tone abnormalities are noted.      Neck: No obvious deformity or signs of trauma.  Normal cervical spine range of motion.    Back: Straight leg raising in the sitting position is negative for radicular pain. No pain to palpation over the lumbar spine and paraspinous muscles.  Negative for pain with facet loading and back extension/rotation. Normal range of motion without pain reproduction.    Buttocks:  No pain to palpation over the PSIS.     Extremities:  Peripheral joint ROM is full and pain free without obvious instability or laxity in all four extremities. No edema or skin discolorations noted. Tenderness to deep palpation throughout the thighs and calf muscles.    Gait:  Gait is normal    NEUR:  No loss of sensation is noted.     Strength testing:    Right hip flexion: 5/5  Left hip flexion: 5/5  Right knee extension: 5/5  Left knee extension: 5/5  Right knee flexion: 5/5  Left knee flexion: 5/5  Right ankle dorsiflexion: 5/5  Left ankle dorsiflexion: 5/5        ASSESSMENT:  78 y.o. male with subacute bilateral leg pain, most consistent with myofascial pain.     1. Pain in both lower legs    2. Myofascial muscle pain          PLAN:     - I have stressed the importance of physical activity and a home exercise plan to help with pain and improve health.  - List of leg stretches provided to the patient.  - Order lower extremity venous U/S to rule out DVT.  - Rx Mobic 7.5 mg daily as needed for pain.  - Return to clinic with any new or worsening symptoms, or if symptoms persist.    The above plan and management options were discussed at length with patient. Patient is in agreement with the above and verbalized understanding. It will be communicated with the referring physician via electronic record, fax, or mail.    Kj Sandhu III  04/03/2019

## 2019-04-23 ENCOUNTER — PATIENT MESSAGE (OUTPATIENT)
Dept: CARDIOLOGY | Facility: CLINIC | Age: 79
End: 2019-04-23

## 2019-05-10 ENCOUNTER — OFFICE VISIT (OUTPATIENT)
Dept: ORTHOPEDICS | Facility: CLINIC | Age: 79
End: 2019-05-10
Payer: MEDICARE

## 2019-05-10 VITALS — WEIGHT: 216.63 LBS | BODY MASS INDEX: 32.08 KG/M2 | HEIGHT: 69 IN

## 2019-05-10 DIAGNOSIS — M17.0 PRIMARY OSTEOARTHRITIS OF BOTH KNEES: Primary | ICD-10-CM

## 2019-05-10 PROCEDURE — 20610 PR DRAIN/INJECT LARGE JOINT/BURSA: ICD-10-PCS | Mod: 50,HCNC,S$GLB, | Performed by: NURSE PRACTITIONER

## 2019-05-10 PROCEDURE — 99999 PR PBB SHADOW E&M-EST. PATIENT-LVL III: ICD-10-PCS | Mod: PBBFAC,HCNC,, | Performed by: NURSE PRACTITIONER

## 2019-05-10 PROCEDURE — 1101F PR PT FALLS ASSESS DOC 0-1 FALLS W/OUT INJ PAST YR: ICD-10-PCS | Mod: HCNC,CPTII,S$GLB, | Performed by: NURSE PRACTITIONER

## 2019-05-10 PROCEDURE — 99213 OFFICE O/P EST LOW 20 MIN: CPT | Mod: 25,HCNC,S$GLB, | Performed by: NURSE PRACTITIONER

## 2019-05-10 PROCEDURE — 99213 PR OFFICE/OUTPT VISIT, EST, LEVL III, 20-29 MIN: ICD-10-PCS | Mod: 25,HCNC,S$GLB, | Performed by: NURSE PRACTITIONER

## 2019-05-10 PROCEDURE — 1101F PT FALLS ASSESS-DOCD LE1/YR: CPT | Mod: HCNC,CPTII,S$GLB, | Performed by: NURSE PRACTITIONER

## 2019-05-10 PROCEDURE — 99999 PR PBB SHADOW E&M-EST. PATIENT-LVL III: CPT | Mod: PBBFAC,HCNC,, | Performed by: NURSE PRACTITIONER

## 2019-05-10 PROCEDURE — 20610 DRAIN/INJ JOINT/BURSA W/O US: CPT | Mod: 50,HCNC,S$GLB, | Performed by: NURSE PRACTITIONER

## 2019-05-10 RX ORDER — TRIAMCINOLONE ACETONIDE 40 MG/ML
80 INJECTION, SUSPENSION INTRA-ARTICULAR; INTRAMUSCULAR ONCE
Status: COMPLETED | OUTPATIENT
Start: 2019-05-10 | End: 2019-05-10

## 2019-05-10 RX ADMIN — TRIAMCINOLONE ACETONIDE 80 MG: 40 INJECTION, SUSPENSION INTRA-ARTICULAR; INTRAMUSCULAR at 03:05

## 2019-05-10 NOTE — PROGRESS NOTES
CC: Pain of the Left Knee and Pain of the Right Knee      HPI: Pt with c/o bilateral knee pain for the past few weeks. The pain is aching and medial and worse with increased activity. He has known djd and has been seen in the past for cortisone injections with good relief. His last injections were in January 2018. He had good relief until now and he would like to repeat injections today. He is very active and ambulates without assistive device. There is not a limp.    ROS  General: denies fever and chills  Resp: no c/o sob  CVS: no c/o cp  MSK: c/o bilateral knee pain    PE  General: AAOx3, pleasant and cooperative  Resp: respirations even and unlabored  MSK: bilateral knee exam  0 degrees extension  120 degrees flexion  No warmth or erythema   - effusion    Assessment:  Bilateral knee djd    Plan:  Cortisone injections bilateral knees today  RICE  nsaids or tylenol prn  F/u as needed    Knee Injection Procedure Note    Diagnosis: bilateral knee degenerative arthritis  Indications: bilateral knee pain  Procedure Details: Verbal consent was obtained for the procedure. The injection site was identified and the skin was prepared with alcohol. The bilateral knee was injected from an anterolateral approach with 1 ml of Kenalog and 4 ml Lidocaine under sterile technique using a 22 gauge needle. The needle was removed and the area cleansed and dressed.  Complications:  Patient tolerated the procedure well.    he was advised to rest the knee today, using ice and elevation as needed for comfort and swelling.Immediate relief of the knee pain may be short lived and secondary to the lidocaine. he may have an increase in discomfort tonight followed by steady improvement over the next several days. It may take 1-2 weeks following the injection to get the full benefit of the medication.

## 2019-05-24 ENCOUNTER — PES CALL (OUTPATIENT)
Dept: ADMINISTRATIVE | Facility: CLINIC | Age: 79
End: 2019-05-24

## 2019-07-01 RX ORDER — ALLOPURINOL 300 MG/1
300 TABLET ORAL DAILY
Qty: 90 TABLET | Refills: 3 | Status: SHIPPED | OUTPATIENT
Start: 2019-07-01 | End: 2020-03-09

## 2019-07-03 RX ORDER — ATORVASTATIN CALCIUM 20 MG/1
20 TABLET, FILM COATED ORAL DAILY
Qty: 90 TABLET | Refills: 3 | Status: SHIPPED | OUTPATIENT
Start: 2019-07-03 | End: 2019-08-13 | Stop reason: SDUPTHER

## 2019-07-03 RX ORDER — METOPROLOL SUCCINATE 25 MG/1
25 TABLET, EXTENDED RELEASE ORAL DAILY
Qty: 90 TABLET | Refills: 3 | Status: SHIPPED | OUTPATIENT
Start: 2019-07-03 | End: 2019-08-13 | Stop reason: SDUPTHER

## 2019-07-26 ENCOUNTER — OFFICE VISIT (OUTPATIENT)
Dept: CARDIOLOGY | Facility: CLINIC | Age: 79
End: 2019-07-26
Payer: MEDICARE

## 2019-07-26 VITALS
HEIGHT: 69 IN | SYSTOLIC BLOOD PRESSURE: 137 MMHG | DIASTOLIC BLOOD PRESSURE: 71 MMHG | WEIGHT: 216.25 LBS | HEART RATE: 58 BPM | BODY MASS INDEX: 32.03 KG/M2

## 2019-07-26 DIAGNOSIS — E66.09 NON MORBID OBESITY DUE TO EXCESS CALORIES: ICD-10-CM

## 2019-07-26 DIAGNOSIS — E78.5 HYPERLIPIDEMIA, UNSPECIFIED HYPERLIPIDEMIA TYPE: ICD-10-CM

## 2019-07-26 DIAGNOSIS — I51.89 DIASTOLIC DYSFUNCTION: ICD-10-CM

## 2019-07-26 DIAGNOSIS — I25.10 CORONARY ARTERY DISEASE INVOLVING NATIVE CORONARY ARTERY OF NATIVE HEART WITHOUT ANGINA PECTORIS: Primary | ICD-10-CM

## 2019-07-26 PROCEDURE — 1101F PR PT FALLS ASSESS DOC 0-1 FALLS W/OUT INJ PAST YR: ICD-10-PCS | Mod: HCNC,CPTII,S$GLB, | Performed by: INTERNAL MEDICINE

## 2019-07-26 PROCEDURE — 99214 OFFICE O/P EST MOD 30 MIN: CPT | Mod: HCNC,S$GLB,, | Performed by: INTERNAL MEDICINE

## 2019-07-26 PROCEDURE — 99499 RISK ADDL DX/OHS AUDIT: ICD-10-PCS | Mod: HCNC,S$GLB,, | Performed by: INTERNAL MEDICINE

## 2019-07-26 PROCEDURE — 99999 PR PBB SHADOW E&M-EST. PATIENT-LVL III: CPT | Mod: PBBFAC,HCNC,, | Performed by: INTERNAL MEDICINE

## 2019-07-26 PROCEDURE — 1101F PT FALLS ASSESS-DOCD LE1/YR: CPT | Mod: HCNC,CPTII,S$GLB, | Performed by: INTERNAL MEDICINE

## 2019-07-26 PROCEDURE — 99214 PR OFFICE/OUTPT VISIT, EST, LEVL IV, 30-39 MIN: ICD-10-PCS | Mod: HCNC,S$GLB,, | Performed by: INTERNAL MEDICINE

## 2019-07-26 PROCEDURE — 99499 UNLISTED E&M SERVICE: CPT | Mod: HCNC,S$GLB,, | Performed by: INTERNAL MEDICINE

## 2019-07-26 PROCEDURE — 99999 PR PBB SHADOW E&M-EST. PATIENT-LVL III: ICD-10-PCS | Mod: PBBFAC,HCNC,, | Performed by: INTERNAL MEDICINE

## 2019-07-26 NOTE — PROGRESS NOTES
Subjective:    Patient ID:  Herber Camarillo is a 79 y.o. male who presents for follow-up of Coronary artery disease involving native coronary artery of  (1 yr fu)      HPI     The patient is a 79 year male followed  post NSTEMI [?PCI to LAD] 2000. He continues to do well and has not chest pain or WILKINS .  Lab Results   Component Value Date     01/08/2019    K 5.1 01/08/2019     01/08/2019    CO2 25 01/08/2019    BUN 15 01/08/2019    CREATININE 1.11 01/08/2019     01/08/2019    HGBA1C 4.9 12/04/2017    AST 32 01/08/2019    ALT 32 01/08/2019    ALBUMIN 3.8 01/08/2019    PROT 6.8 01/08/2019    BILITOT 0.6 01/08/2019    WBC 7.70 07/16/2019    HGB 16.3 07/16/2019    HCT 48.4 07/16/2019    MCV 94 07/16/2019     07/16/2019    INR 1.0 12/16/2016    PSA 0.03 01/08/2019    TSH 1.987 12/16/2016         Lab Results   Component Value Date    CHOL 136 07/16/2019    HDL 39 (L) 07/16/2019    TRIG 106 07/16/2019       Lab Results   Component Value Date    LDLCALC 75.8 07/16/2019       Past Medical History:   Diagnosis Date    Arthritis     CAD (coronary artery disease) 10/30/2012    Cataract     Dermatochalasis of both upper eyelids     Fever blister     Glucose intolerance (impaired glucose tolerance) 7/24/2013    Heart attack     Hyperlipidemia 10/30/2012    Joint pain     Keloid cicatrix     Lung nodule, solitary 4/30/2015    Non morbid obesity due to excess calories 10/20/2015    Obesity     Overweight(278.02) 9/5/2013    Prostate cancer     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       Current Outpatient Medications:     allopurinol (ZYLOPRIM) 300 MG tablet, Take 1 tablet (300 mg total) by mouth once daily., Disp: 90 tablet, Rfl: 3    aspirin (ECOTRIN) 81 MG EC tablet, Take 81 mg by mouth once daily., Disp: , Rfl:     atorvastatin (LIPITOR) 20 MG tablet, Take 1 tablet (20 mg total) by mouth once daily., Disp: 90 tablet, Rfl: 3    b complex vitamins tablet, Take 1 tablet by mouth once daily.,  Disp: , Rfl:     metoprolol succinate (TOPROL-XL) 25 MG 24 hr tablet, Take 1 tablet (25 mg total) by mouth once daily., Disp: 90 tablet, Rfl: 3    nitroGLYCERIN (NITROSTAT) 0.4 MG SL tablet, Place 1 tablet (0.4 mg total) under the tongue every 5 (five) minutes as needed for Chest pain. ONE UNDER TONGUE FOR ANGINA MAY REPEAT IN 5 MIN IF IT STILL CONTINUES, CALL 911 AND TAKE AN ASPIRIN, Disp: 25 tablet, Rfl: 12    omega-3 fatty acids-vitamin E (FISH OIL) 1,000 mg Cap, Take by mouth., Disp: , Rfl:           Review of Systems   Constitution: Negative for decreased appetite, diaphoresis, fever, malaise/fatigue, weight gain and weight loss.   HENT: Negative for congestion, ear discharge, ear pain and nosebleeds.    Eyes: Negative for blurred vision, double vision and visual disturbance.   Cardiovascular: Negative for chest pain, claudication, cyanosis, dyspnea on exertion, irregular heartbeat, leg swelling, near-syncope, orthopnea, palpitations, paroxysmal nocturnal dyspnea and syncope.   Respiratory: Negative for cough, hemoptysis, shortness of breath, sleep disturbances due to breathing, snoring, sputum production and wheezing.    Endocrine: Negative for polydipsia, polyphagia and polyuria.   Hematologic/Lymphatic: Negative for adenopathy and bleeding problem. Does not bruise/bleed easily.   Skin: Negative for color change, nail changes, poor wound healing and rash.   Musculoskeletal: Negative for muscle cramps and muscle weakness.   Gastrointestinal: Negative for abdominal pain, anorexia, change in bowel habit, hematochezia, nausea and vomiting.   Genitourinary: Negative for dysuria, frequency and hematuria.   Neurological: Negative for brief paralysis, difficulty with concentration, excessive daytime sleepiness, dizziness, focal weakness, headaches, light-headedness, seizures, vertigo and weakness.   Psychiatric/Behavioral: Negative for altered mental status and depression.   Allergic/Immunologic: Negative for  "persistent infections.        Objective:/71 (BP Location: Left arm, Patient Position: Sitting, BP Method: Large (Automatic))   Pulse (!) 58   Ht 5' 8.5" (1.74 m)   Wt 98.1 kg (216 lb 4.3 oz)   BMI 32.41 kg/m²             Physical Exam   Constitutional: He is oriented to person, place, and time. He appears well-developed and well-nourished.   HENT:   Head: Normocephalic.   Right Ear: External ear normal.   Left Ear: External ear normal.   Nose: Nose normal.   Inspection of lips, teeth and gums normal   Eyes: Pupils are equal, round, and reactive to light. EOM are normal. No scleral icterus.   Neck: Normal range of motion. Neck supple. No JVD present. No tracheal deviation present. No thyromegaly present.   Cardiovascular: Normal rate, regular rhythm and intact distal pulses. Exam reveals no gallop and no friction rub.   No murmur heard.  Pulses:       Dorsalis pedis pulses are 2+ on the right side, and 2+ on the left side.        Posterior tibial pulses are 2+ on the right side, and 2+ on the left side.   Pulmonary/Chest: Effort normal and breath sounds normal.   Abdominal: Bowel sounds are normal. He exhibits no distension. There is no hepatosplenomegaly. There is no tenderness. There is no guarding.   Musculoskeletal: Normal range of motion. He exhibits no edema or tenderness.   Lymphadenopathy:   Palpation of neck and groin lymph nodes normal   Neurological: He is alert and oriented to person, place, and time. No cranial nerve deficit. He exhibits normal muscle tone. Coordination normal.   Skin: Skin is dry.   Palpation of skin normal   Psychiatric: His behavior is normal. Judgment and thought content normal.         Assessment:       1. Coronary artery disease involving native coronary artery of native heart without angina pectoris    2. Diastolic dysfunction    3. Hyperlipidemia, unspecified hyperlipidemia type    4. Non morbid obesity due to excess calories         Plan:       Herber was seen today for " coronary artery disease involving native coronary artery of .    Diagnoses and all orders for this visit:    Coronary artery disease involving native coronary artery of native heart without angina pectoris  -     Comprehensive metabolic panel; Future; Expected date: 07/25/2020  -     CBC auto differential; Future; Expected date: 07/25/2020  -     Lipid panel; Future; Expected date: 07/25/2020    Diastolic dysfunction  -     Comprehensive metabolic panel; Future; Expected date: 07/25/2020    Hyperlipidemia, unspecified hyperlipidemia type  -     Lipid panel; Future; Expected date: 07/25/2020    Non morbid obesity due to excess calories

## 2019-08-13 RX ORDER — ATORVASTATIN CALCIUM 20 MG/1
20 TABLET, FILM COATED ORAL DAILY
Qty: 90 TABLET | Refills: 3 | Status: SHIPPED | OUTPATIENT
Start: 2019-08-13 | End: 2022-05-15 | Stop reason: SDUPTHER

## 2019-08-13 RX ORDER — METOPROLOL SUCCINATE 25 MG/1
25 TABLET, EXTENDED RELEASE ORAL DAILY
Qty: 90 TABLET | Refills: 3 | Status: SHIPPED | OUTPATIENT
Start: 2019-08-13 | End: 2020-06-02

## 2019-09-09 ENCOUNTER — TELEPHONE (OUTPATIENT)
Dept: INTERNAL MEDICINE | Facility: CLINIC | Age: 79
End: 2019-09-09

## 2019-09-09 NOTE — TELEPHONE ENCOUNTER
----- Message from Jesus Krueger sent at 9/9/2019  1:15 PM CDT -----  Contact: Self [phone]  Caller is requesting a sooner appointment. Caller declined first available appointment listed below. Caller will not accept being placed on the wait list and is requesting a message be sent to the provider.    When is the next available appointment:  1/16/20  Did you offer to schedule the next available appt and put the patient on the wait list?:   yes  What visit type: EPP  Symptoms:  Physical  Patient preference of timeframe to be scheduled:  October 23, 2019  What is the reason the patient is requesting a sooner appointment? (insurance terminating, changing jobs):    Would the patient rather a call back or a response via [facility]?:  Call back  Comments:

## 2019-09-12 ENCOUNTER — PES CALL (OUTPATIENT)
Dept: ADMINISTRATIVE | Facility: CLINIC | Age: 79
End: 2019-09-12

## 2019-09-17 ENCOUNTER — IMMUNIZATION (OUTPATIENT)
Dept: PHARMACY | Facility: CLINIC | Age: 79
End: 2019-09-17
Payer: MEDICARE

## 2019-09-27 ENCOUNTER — OFFICE VISIT (OUTPATIENT)
Dept: ORTHOPEDICS | Facility: CLINIC | Age: 79
End: 2019-09-27
Payer: MEDICARE

## 2019-09-27 VITALS
HEIGHT: 68 IN | HEART RATE: 63 BPM | WEIGHT: 218.13 LBS | SYSTOLIC BLOOD PRESSURE: 138 MMHG | DIASTOLIC BLOOD PRESSURE: 67 MMHG | BODY MASS INDEX: 33.06 KG/M2

## 2019-09-27 DIAGNOSIS — M17.0 PRIMARY OSTEOARTHRITIS OF BOTH KNEES: ICD-10-CM

## 2019-09-27 PROCEDURE — 1101F PR PT FALLS ASSESS DOC 0-1 FALLS W/OUT INJ PAST YR: ICD-10-PCS | Mod: HCNC,CPTII,S$GLB, | Performed by: NURSE PRACTITIONER

## 2019-09-27 PROCEDURE — 99999 PR PBB SHADOW E&M-EST. PATIENT-LVL III: CPT | Mod: PBBFAC,HCNC,, | Performed by: NURSE PRACTITIONER

## 2019-09-27 PROCEDURE — 99213 OFFICE O/P EST LOW 20 MIN: CPT | Mod: HCNC,25,S$GLB, | Performed by: NURSE PRACTITIONER

## 2019-09-27 PROCEDURE — 20610 DRAIN/INJ JOINT/BURSA W/O US: CPT | Mod: 50,HCNC,S$GLB, | Performed by: NURSE PRACTITIONER

## 2019-09-27 PROCEDURE — 99213 PR OFFICE/OUTPT VISIT, EST, LEVL III, 20-29 MIN: ICD-10-PCS | Mod: HCNC,25,S$GLB, | Performed by: NURSE PRACTITIONER

## 2019-09-27 PROCEDURE — 1101F PT FALLS ASSESS-DOCD LE1/YR: CPT | Mod: HCNC,CPTII,S$GLB, | Performed by: NURSE PRACTITIONER

## 2019-09-27 PROCEDURE — 99999 PR PBB SHADOW E&M-EST. PATIENT-LVL III: ICD-10-PCS | Mod: PBBFAC,HCNC,, | Performed by: NURSE PRACTITIONER

## 2019-09-27 PROCEDURE — 20610 PR DRAIN/INJECT LARGE JOINT/BURSA: ICD-10-PCS | Mod: 50,HCNC,S$GLB, | Performed by: NURSE PRACTITIONER

## 2019-09-27 RX ADMIN — TRIAMCINOLONE ACETONIDE 80 MG: 40 INJECTION, SUSPENSION INTRA-ARTICULAR; INTRAMUSCULAR at 03:09

## 2019-09-27 NOTE — PROGRESS NOTES
CC: Injections of the Left Knee and Injections of the Right Knee      HPI: Pt with c/o bilateral knee pain for the past week or so. The pain is aching and located in the medial joint space. The right is greater than the left. He has had cortisone injections in the past with good relief and he would like to repeat those today. His last cortisone injections were in May. He is unable to tolerate nsaids and tylenol only offers minimal relief. He is very active and works in the yard a lot. He is ambulating without assistive device. There is not a limp.    ROS  General: denies fever and chills  Resp: no c/o sob  CVS: no c/o cp  MSK: c/o bilateral knee pain which is aching and medial and worse with increased activity    PE  General: AAOx3, pleasant and cooperative  Resp: respirations even and unlabored  MSK: bilateral knee exam  0 degrees extension  120 degrees flexion  No warmth or erythema   - effusion  + crepitus  5/5 quad strength  + tenderness over the medial joint line    Assessment:  Bilateral knee djd    Plan:  Cortisone injections bilateral knees today  RICE  Tylenol prn  Topical creams prn  F/u as needed    Knee Injection Procedure Note    Diagnosis: bilateral knee degenerative arthritis  Indications: bilateral knee pain  Procedure Details: Verbal consent was obtained for the procedure. The injection site was identified and the skin was prepared with alcohol. The bilateral knee was injected from an anterolateral approach with 1 ml of Kenalog and 4 ml Lidocaine  each under sterile technique using a 22 gauge needle. The needle was removed and the area cleansed and dressed.  Complications:  Patient tolerated the procedure well.    he was advised to rest the knee today, using ice and elevation as needed for comfort and swelling.Immediate relief of the knee pain may be short lived and secondary to the lidocaine. he may have an increase in discomfort tonight followed by steady improvement over the next several days. It  may take 1-2 weeks following the injection to get the full benefit of the medication.

## 2019-09-29 RX ORDER — TRIAMCINOLONE ACETONIDE 40 MG/ML
80 INJECTION, SUSPENSION INTRA-ARTICULAR; INTRAMUSCULAR
Status: COMPLETED | OUTPATIENT
Start: 2019-09-27 | End: 2019-09-27

## 2019-10-03 ENCOUNTER — PATIENT OUTREACH (OUTPATIENT)
Dept: ADMINISTRATIVE | Facility: HOSPITAL | Age: 79
End: 2019-10-03

## 2019-10-17 ENCOUNTER — LAB VISIT (OUTPATIENT)
Dept: LAB | Facility: HOSPITAL | Age: 79
End: 2019-10-17
Attending: INTERNAL MEDICINE
Payer: MEDICARE

## 2019-10-17 ENCOUNTER — OFFICE VISIT (OUTPATIENT)
Dept: INTERNAL MEDICINE | Facility: CLINIC | Age: 79
End: 2019-10-17
Payer: MEDICARE

## 2019-10-17 ENCOUNTER — IMMUNIZATION (OUTPATIENT)
Dept: PHARMACY | Facility: CLINIC | Age: 79
End: 2019-10-17

## 2019-10-17 ENCOUNTER — PATIENT MESSAGE (OUTPATIENT)
Dept: INTERNAL MEDICINE | Facility: CLINIC | Age: 79
End: 2019-10-17

## 2019-10-17 ENCOUNTER — IMMUNIZATION (OUTPATIENT)
Dept: PHARMACY | Facility: CLINIC | Age: 79
End: 2019-10-17
Payer: MEDICARE

## 2019-10-17 VITALS
DIASTOLIC BLOOD PRESSURE: 65 MMHG | SYSTOLIC BLOOD PRESSURE: 118 MMHG | HEIGHT: 71 IN | BODY MASS INDEX: 29.63 KG/M2 | WEIGHT: 211.63 LBS

## 2019-10-17 DIAGNOSIS — R10.9 FLANK PAIN: ICD-10-CM

## 2019-10-17 DIAGNOSIS — I51.89 DIASTOLIC DYSFUNCTION: ICD-10-CM

## 2019-10-17 DIAGNOSIS — C61 PROSTATE CANCER: ICD-10-CM

## 2019-10-17 DIAGNOSIS — E78.5 HYPERLIPIDEMIA, UNSPECIFIED HYPERLIPIDEMIA TYPE: ICD-10-CM

## 2019-10-17 DIAGNOSIS — Z12.11 COLON CANCER SCREENING: ICD-10-CM

## 2019-10-17 DIAGNOSIS — Z12.5 ENCOUNTER FOR SCREENING FOR MALIGNANT NEOPLASM OF PROSTATE: ICD-10-CM

## 2019-10-17 DIAGNOSIS — Z00.00 ANNUAL PHYSICAL EXAM: Primary | ICD-10-CM

## 2019-10-17 DIAGNOSIS — E55.9 VITAMIN D DEFICIENCY DISEASE: ICD-10-CM

## 2019-10-17 DIAGNOSIS — L57.0 ACTINIC KERATOSIS: ICD-10-CM

## 2019-10-17 LAB
BILIRUB UR QL STRIP: NEGATIVE
CLARITY UR REFRACT.AUTO: CLEAR
COLOR UR AUTO: YELLOW
GLUCOSE UR QL STRIP: NEGATIVE
HGB UR QL STRIP: NEGATIVE
KETONES UR QL STRIP: NEGATIVE
LEUKOCYTE ESTERASE UR QL STRIP: NEGATIVE
NITRITE UR QL STRIP: NEGATIVE
PH UR STRIP: 5 [PH] (ref 5–8)
PROT UR QL STRIP: NEGATIVE
SP GR UR STRIP: 1.01 (ref 1–1.03)
URN SPEC COLLECT METH UR: NORMAL

## 2019-10-17 PROCEDURE — 99397 PR PREVENTIVE VISIT,EST,65 & OVER: ICD-10-PCS | Mod: HCNC,S$GLB,, | Performed by: INTERNAL MEDICINE

## 2019-10-17 PROCEDURE — 81003 URINALYSIS AUTO W/O SCOPE: CPT | Mod: HCNC

## 2019-10-17 PROCEDURE — 99999 PR PBB SHADOW E&M-EST. PATIENT-LVL IV: ICD-10-PCS | Mod: PBBFAC,HCNC,, | Performed by: INTERNAL MEDICINE

## 2019-10-17 PROCEDURE — 99397 PER PM REEVAL EST PAT 65+ YR: CPT | Mod: HCNC,S$GLB,, | Performed by: INTERNAL MEDICINE

## 2019-10-17 PROCEDURE — 82274 ASSAY TEST FOR BLOOD FECAL: CPT | Mod: HCNC

## 2019-10-17 PROCEDURE — 99999 PR PBB SHADOW E&M-EST. PATIENT-LVL IV: CPT | Mod: PBBFAC,HCNC,, | Performed by: INTERNAL MEDICINE

## 2019-10-17 NOTE — PROGRESS NOTES
Subjective:       Patient ID: Herber Camarillo is a 79 y.o. male.    Chief Complaint: Annual Exam    Annual exam.     Here with wife.     Some DJD changes,knee primarily.    Got shots recently and this has helped.  Also some ongoing back sx.   We have done several xrays including rib xrays and a bone scan, abdominal u/s, CT of the chest all unrevealing.  Continued pain along the margin of the ribs on the right, in back to mid axillary area.  Bottom rib is sore.  Intermittent sx, not severe.  Never smoked.   Discussed getting another urinalysis and ultrasound considering an MRI of the thoracic spine.  He does not want to pursue higher imaging currently.     Prostate cancer discharged from Urology 2011.  IFG, CAD, OA.  Lung nodule .7 7/15, stable 12/16.  Uric acid .  Obesity.  ASCVD- Morin in Cardiology.   R THR;  cholecystectomy     Has seen Ortho and gotten shots for his knees.     Labs acceptable; all reviewed.    Cardiology July 2019  Dermatology March 2019  He does not want to pursue colonoscopy but agrees to do a fit kit.    Patient Active Problem List:     Uric acid crystallopathy     Hyperlipidemia     Prostate cancer: discharged from the urology clinic in 2011.     Diastolic dysfunction     Atherosclerosis of aorta: see CT scan 7/15     Lung nodule, solitary .7 cm 7/15; stable 12/16     Coronary artery disease involving native coronary artery of native heart without angina pectoris     Non morbid obesity due to excess calories     Primary osteoarthritis of right hip      Review of Systems   Constitutional: Negative for activity change and unexpected weight change.   HENT: Negative for hearing loss, rhinorrhea and trouble swallowing.    Eyes: Negative for discharge and visual disturbance.   Respiratory: Negative for chest tightness and wheezing.    Cardiovascular: Negative for chest pain and palpitations.        See above   Gastrointestinal: Negative for blood in stool, constipation, diarrhea and vomiting.    Endocrine: Negative for polydipsia and polyuria.   Genitourinary: Negative for difficulty urinating, hematuria and urgency.   Musculoskeletal: Positive for arthralgias and neck pain. Negative for joint swelling.        See above   Neurological: Negative for weakness and headaches.   Psychiatric/Behavioral: Negative for confusion and dysphoric mood.       Objective:      Physical Exam   Constitutional: He is oriented to person, place, and time. He appears well-developed and well-nourished.   HENT:   Head: Normocephalic and atraumatic.   Right Ear: External ear normal.   Left Ear: External ear normal.   Eyes: Conjunctivae and EOM are normal.   Neck: Normal range of motion. Neck supple. No thyromegaly present.   Cardiovascular: Normal rate and regular rhythm.   No murmur heard.  Pulmonary/Chest: Effort normal and breath sounds normal. No respiratory distress. He has no wheezes.   Abdominal: Soft. He exhibits no distension. There is no tenderness.   Musculoskeletal: He exhibits no edema or tenderness.   Slight pain when palpating along the margin of the 12th rib in the right side mid back   Lymphadenopathy:     He has no cervical adenopathy.   Neurological: He is alert and oriented to person, place, and time. No cranial nerve deficit.   Skin: Skin is warm and dry.   Actinic changes arms and legs, also face and neck   Psychiatric: He has a normal mood and affect. His behavior is normal.       Assessment:       1. Annual physical exam    2. Prostate cancer: discharged from the urology clinic in 2011.    3. Vitamin D deficiency disease    4. Hyperlipidemia, unspecified hyperlipidemia type    5. Diastolic dysfunction    6. Encounter for screening for malignant neoplasm of prostate    7. Colon cancer screening    8. Flank pain    9. Actinic keratosis        Plan:         Herber was seen today for annual exam.    Diagnoses and all orders for this visit:    Annual physical exam    Prostate cancer: discharged from the urology  clinic in 2011.  No current symptoms    Vitamin D deficiency disease  -     Vitamin D; Future    Hyperlipidemia, unspecified hyperlipidemia type  -     CBC auto differential; Future  -     Comprehensive metabolic panel; Future  -     Lipid panel; Future    Diastolic dysfunction:  Stable without alarm symptoms.  Keep Cardiology follow-up    Encounter for screening for malignant neoplasm of prostate  -     PSA, Screening; Future    Colon cancer screening  -     Fecal Immunochemical Test (iFOBT); Future    Flank pain  -     Urinalysis  -     US Abdomen Complete; Future    Actinic keratosis  -     Ambulatory referral to Dermatology    Flu shot done already  Tdap today  Shingles vaccine recommended  Advanced directives discussed today  Portion control, lifestyle modification of slow and steady weight loss recommended    I will review all studies and determine further tx depending on findings

## 2019-10-17 NOTE — PATIENT INSTRUCTIONS
Prevention Guidelines, Men Ages 65 and Older  Screening tests and vaccines are an important part of managing your health. Health counseling is essential, too. Below are guidelines for these, for men ages 65 and older. Talk with your healthcare provider to make sure youre up-to-date on what you need.  Screening Who needs it How often   Abdominal aortic aneurysm Men ages 65 to 75 who have ever smoked 1 ultrasound   Alcohol misuse All men in this age group At routine exams   Blood pressure All men in this age group Every 2 years if your blood pressure is less than 120/80 mm Hg; yearly if your systolic blood pressure is 120 to 139 mm Hg, or your diastolic blood pressure reading is 80 to 89 mm Hg   Colorectal cancer All men in this age group Flexible sigmoidoscopy every 5 years, or colonoscopy every 10 years, or double-contrast barium enema every 5 years; yearly fecal occult blood test or fecal immunochemical test; or a stool DNA test as often as your healthcare provider advises; talk with your healthcare provider about which tests are best for you and when you no longer need colonoscopies (generally after age 75)   Depression All men in this age group At routine exams   Type 2 diabetes or prediabetes All adults beginning at age 45 and adults without symptoms at any age who are overweight or obese and have 1 or more other risk factors for diabetes At least every 3 years (yearly if your blood sugar has already begun to rise)   Hepatitis C Men at increased risk for infection - talk with your healthcare provider At routine exams   High cholesterol or triglycerides All men in this age group At least every 5 years   HIV Men at increased risk for infection - talk with your healthcare provider At routine exams   Lung cancer Adults ages 55 to 80 who have smoked Yearly screening in smokers with 30 pack-year history of smoking or who quit within 15 years   Obesity All men in this age group At routine exams   Prostate cancer All  men in this age group, talk to healthcare provider about risks and benefits of digital rectal exam (BRONWYN) and prostate-specific antigen (PSA) screening1 At routine exams   Syphilis Men at increased risk for infection - talk with your healthcare provider At routine exams   Tuberculosis Men at increased risk for infection - talk with your healthcare provider Ask your healthcare provider   Vision All men in this age group Every 1 to 2 years; if you have a chronic health condition, ask your healthcare provider if you needs exams more often   Vaccine Who needs it How often   Chickenpox (varicella) All men in this age group who have no record of this infection or vaccine 2 doses; second dose should be given at least 4 weeks after the first dose   Hepatitis A Men at increased risk for infection - talk with your healthcare provider 2 doses given at least 6 months apart   Hepatitis B Men at increased risk for infection - talk with your healthcare provider 3 doses over 6 months; second dose should be given 1 month after the first dose; the third dose should be given at least 2 months after the second dose and at least 4 months after the first dose   Haemophilus influenzae Type B (HIB) Men at increased risk for infection - talk with your healthcare provider 1 to 3 doses   Influenza (flu) All men in this age group  Once a year   Meningococcal Men at increased risk for infection - talk with your healthcare provider 1 or more doses   Pneumococcal conjugate vaccine (PCV13) and pneumococcal polysaccharide vaccine (PPSV23) All men in this age group 1 dose of each vaccine   Tetanus/diphtheria/  pertussis (Td/Tdap) booster All men in this age group Td every 10 years, or Tdap if you will have contact with a child younger than 12 months old   Zoster All men in this age group 1 dose   Counseling Who needs it How often   Diet and exercise Men who are overweight or obese When diagnosed, and then at routine exams   Fall prevention (exercise,  vitamin D supplements) All men in this age group At routine exams   Sexually transmitted infection Men at increased risk for infection - talk with your healthcare provider At routine exams   Use of daily aspirin Men ages 45 to 79 at risk for cardiovascular health problems At routine exams   Use of tobacco and the health effects it can cause All men in this age group Every visit   [address] [place] [address] [facility]   Date Last Reviewed: 2/1/2017  © 6618-7936 [employer]. [address] [place] [address], [place] [address]. All rights reserved. This information is not intended as a substitute for professional medical care. Always follow your healthcare professional's instructions.

## 2019-10-22 ENCOUNTER — PATIENT MESSAGE (OUTPATIENT)
Dept: INTERNAL MEDICINE | Facility: CLINIC | Age: 79
End: 2019-10-22

## 2019-10-25 ENCOUNTER — PATIENT MESSAGE (OUTPATIENT)
Dept: INTERNAL MEDICINE | Facility: CLINIC | Age: 79
End: 2019-10-25

## 2019-10-25 LAB — HEMOCCULT STL QL IA: NEGATIVE

## 2020-01-14 ENCOUNTER — PATIENT MESSAGE (OUTPATIENT)
Dept: INTERNAL MEDICINE | Facility: CLINIC | Age: 80
End: 2020-01-14

## 2020-02-21 ENCOUNTER — IMMUNIZATION (OUTPATIENT)
Dept: PHARMACY | Facility: CLINIC | Age: 80
End: 2020-02-21
Payer: MEDICARE

## 2020-03-06 ENCOUNTER — OFFICE VISIT (OUTPATIENT)
Dept: ORTHOPEDICS | Facility: CLINIC | Age: 80
End: 2020-03-06
Payer: MEDICARE

## 2020-03-06 VITALS
HEIGHT: 71 IN | DIASTOLIC BLOOD PRESSURE: 77 MMHG | HEART RATE: 55 BPM | WEIGHT: 207.88 LBS | BODY MASS INDEX: 29.1 KG/M2 | SYSTOLIC BLOOD PRESSURE: 141 MMHG

## 2020-03-06 DIAGNOSIS — M17.0 PRIMARY OSTEOARTHRITIS OF BOTH KNEES: Primary | ICD-10-CM

## 2020-03-06 PROCEDURE — 1101F PR PT FALLS ASSESS DOC 0-1 FALLS W/OUT INJ PAST YR: ICD-10-PCS | Mod: HCNC,CPTII,S$GLB, | Performed by: NURSE PRACTITIONER

## 2020-03-06 PROCEDURE — 20610 DRAIN/INJ JOINT/BURSA W/O US: CPT | Mod: 50,HCNC,S$GLB, | Performed by: NURSE PRACTITIONER

## 2020-03-06 PROCEDURE — 99999 PR PBB SHADOW E&M-EST. PATIENT-LVL III: ICD-10-PCS | Mod: PBBFAC,HCNC,, | Performed by: NURSE PRACTITIONER

## 2020-03-06 PROCEDURE — 99213 PR OFFICE/OUTPT VISIT, EST, LEVL III, 20-29 MIN: ICD-10-PCS | Mod: 25,HCNC,S$GLB, | Performed by: NURSE PRACTITIONER

## 2020-03-06 PROCEDURE — 1125F PR PAIN SEVERITY QUANTIFIED, PAIN PRESENT: ICD-10-PCS | Mod: HCNC,S$GLB,, | Performed by: NURSE PRACTITIONER

## 2020-03-06 PROCEDURE — 99999 PR PBB SHADOW E&M-EST. PATIENT-LVL III: CPT | Mod: PBBFAC,HCNC,, | Performed by: NURSE PRACTITIONER

## 2020-03-06 PROCEDURE — 99213 OFFICE O/P EST LOW 20 MIN: CPT | Mod: 25,HCNC,S$GLB, | Performed by: NURSE PRACTITIONER

## 2020-03-06 PROCEDURE — 1159F PR MEDICATION LIST DOCUMENTED IN MEDICAL RECORD: ICD-10-PCS | Mod: HCNC,S$GLB,, | Performed by: NURSE PRACTITIONER

## 2020-03-06 PROCEDURE — 1101F PT FALLS ASSESS-DOCD LE1/YR: CPT | Mod: HCNC,CPTII,S$GLB, | Performed by: NURSE PRACTITIONER

## 2020-03-06 PROCEDURE — 1159F MED LIST DOCD IN RCRD: CPT | Mod: HCNC,S$GLB,, | Performed by: NURSE PRACTITIONER

## 2020-03-06 PROCEDURE — 20610 PR DRAIN/INJECT LARGE JOINT/BURSA: ICD-10-PCS | Mod: 50,HCNC,S$GLB, | Performed by: NURSE PRACTITIONER

## 2020-03-06 PROCEDURE — 1125F AMNT PAIN NOTED PAIN PRSNT: CPT | Mod: HCNC,S$GLB,, | Performed by: NURSE PRACTITIONER

## 2020-03-06 RX ADMIN — TRIAMCINOLONE ACETONIDE 80 MG: 40 INJECTION, SUSPENSION INTRA-ARTICULAR; INTRAMUSCULAR at 03:03

## 2020-03-06 NOTE — PROGRESS NOTES
CC: Pain of the Left Knee and Pain of the Right Knee      HPI: Pt with c/o bilateral knee pain for the past week or so. The pain is aching and medial. It is worse with activity. He is retired, but he works in his garden and is very active. He takes tylenol prn, but he gets better relief with cortisone injections. He returns to repeat cortisone injections today. He is ambulating without assistive device. There is not a limp.    ROS  General: denies fever and chills  Resp: no c/o sob  CVS: no c/o cp  MSK: c/o bilateral knee pain which is medial and aching and worse with increased activity    PE  General: AAOx3, pleasant and cooperative  Resp: respirations even and unlabored  MSK: bilateral knee exam  0 degrees extension  120 degrees flexion  No warmth or erythema   - effusion  5/5 quad strength  + tenderness over the medial joint space    Assessment:  Bilateral knee djd    Plan:  Cortisone injections bilateral knees today  Tylenol prn  RICE  F/u as needed    Knee Injection Procedure Note  Diagnosis: bilateral knee degenerative arthritis  Indications: bilateral knee pain  Procedure Details: Verbal consent was obtained for the procedure. The injection site was identified and the skin was prepared with alcohol. The bilateral knee was injected from an anterolateral approach with 1 ml of Kenalog and 4 ml Lidocaine each under sterile technique using a 22 gauge needle. The needle was removed and the area cleansed and dressed.  Complications:  Patient tolerated the procedure well.    he was advised to rest the knee today, using ice and elevation as needed for comfort and swelling.Immediate relief of the knee pain may be short lived and secondary to the lidocaine. he may have an increase in discomfort tonight followed by steady improvement over the next several days. It may take 1-2 weeks following the injection to get the full benefit of the medication.

## 2020-03-08 RX ORDER — TRIAMCINOLONE ACETONIDE 40 MG/ML
80 INJECTION, SUSPENSION INTRA-ARTICULAR; INTRAMUSCULAR
Status: COMPLETED | OUTPATIENT
Start: 2020-03-06 | End: 2020-03-06

## 2020-03-09 RX ORDER — ALLOPURINOL 300 MG/1
300 TABLET ORAL DAILY
Qty: 90 TABLET | Refills: 3 | Status: SHIPPED | OUTPATIENT
Start: 2020-03-09 | End: 2021-03-30

## 2020-06-02 RX ORDER — METOPROLOL SUCCINATE 25 MG/1
TABLET, EXTENDED RELEASE ORAL
Qty: 90 TABLET | Refills: 3 | Status: SHIPPED | OUTPATIENT
Start: 2020-06-02 | End: 2021-05-24 | Stop reason: SDUPTHER

## 2020-06-23 ENCOUNTER — OFFICE VISIT (OUTPATIENT)
Dept: ORTHOPEDICS | Facility: CLINIC | Age: 80
End: 2020-06-23
Payer: MEDICARE

## 2020-06-23 VITALS — BODY MASS INDEX: 28.95 KG/M2 | HEIGHT: 71 IN | WEIGHT: 206.81 LBS

## 2020-06-23 DIAGNOSIS — M17.0 PRIMARY OSTEOARTHRITIS OF BOTH KNEES: Primary | ICD-10-CM

## 2020-06-23 PROCEDURE — 99999 PR PBB SHADOW E&M-EST. PATIENT-LVL III: ICD-10-PCS | Mod: PBBFAC,HCNC,, | Performed by: NURSE PRACTITIONER

## 2020-06-23 PROCEDURE — 1101F PT FALLS ASSESS-DOCD LE1/YR: CPT | Mod: HCNC,CPTII,S$GLB, | Performed by: NURSE PRACTITIONER

## 2020-06-23 PROCEDURE — 99213 PR OFFICE/OUTPT VISIT, EST, LEVL III, 20-29 MIN: ICD-10-PCS | Mod: HCNC,25,S$GLB, | Performed by: NURSE PRACTITIONER

## 2020-06-23 PROCEDURE — 99213 OFFICE O/P EST LOW 20 MIN: CPT | Mod: HCNC,25,S$GLB, | Performed by: NURSE PRACTITIONER

## 2020-06-23 PROCEDURE — 1159F PR MEDICATION LIST DOCUMENTED IN MEDICAL RECORD: ICD-10-PCS | Mod: HCNC,S$GLB,, | Performed by: NURSE PRACTITIONER

## 2020-06-23 PROCEDURE — 20610 PR DRAIN/INJECT LARGE JOINT/BURSA: ICD-10-PCS | Mod: 50,HCNC,S$GLB, | Performed by: NURSE PRACTITIONER

## 2020-06-23 PROCEDURE — 99999 PR PBB SHADOW E&M-EST. PATIENT-LVL III: CPT | Mod: PBBFAC,HCNC,, | Performed by: NURSE PRACTITIONER

## 2020-06-23 PROCEDURE — 1125F AMNT PAIN NOTED PAIN PRSNT: CPT | Mod: HCNC,S$GLB,, | Performed by: NURSE PRACTITIONER

## 2020-06-23 PROCEDURE — 1159F MED LIST DOCD IN RCRD: CPT | Mod: HCNC,S$GLB,, | Performed by: NURSE PRACTITIONER

## 2020-06-23 PROCEDURE — 1125F PR PAIN SEVERITY QUANTIFIED, PAIN PRESENT: ICD-10-PCS | Mod: HCNC,S$GLB,, | Performed by: NURSE PRACTITIONER

## 2020-06-23 PROCEDURE — 1101F PR PT FALLS ASSESS DOC 0-1 FALLS W/OUT INJ PAST YR: ICD-10-PCS | Mod: HCNC,CPTII,S$GLB, | Performed by: NURSE PRACTITIONER

## 2020-06-23 PROCEDURE — 20610 DRAIN/INJ JOINT/BURSA W/O US: CPT | Mod: 50,HCNC,S$GLB, | Performed by: NURSE PRACTITIONER

## 2020-06-23 RX ADMIN — TRIAMCINOLONE ACETONIDE 80 MG: 40 INJECTION, SUSPENSION INTRA-ARTICULAR; INTRAMUSCULAR at 02:06

## 2020-06-23 NOTE — PROGRESS NOTES
CC: Injections of the Right Knee and Injections of the Left Knee      HPI: Pt with c/o bilateral knee pain and stiffness for the past several weeks. The pain does not limit his activity, but he does have more pain after he does more during the day. He is very active and tends to his garden in the yard and works in the garage. He lives at home with his wife who is also very active and likes to sew and work in the kitchen. He has tried otc medication without much relief. He has had cortisone injections with good relief and would like to repeat that today. He is ambulating without assistive device. There is not a limp.    ROS  General: denies fever and chills  Resp: no c/o sob  CVS: no c/o cp  MSK: c/o bilateral knee pain which is global and with associated stiffness    PE  General: AAOx3, pleasant and cooperative  Resp: respirations even and unlabored  MSK: bilateral knee exam  0 degrees extension  120 degrees flexion  No warmth or erythema   - effusion  + crepitus  - instability    Assessment:  Bilateral knee djd    Plan:  Cortisone injections bilateral knees today  RICE  Tylenol prn  F/u as needed    Knee Injection Procedure Note  Diagnosis: bilateral knee degenerative arthritis  Indications: bilateral knee pain  Procedure Details: Verbal consent was obtained for the procedure. The injection site was identified and the skin was prepared with alcohol. The bilateral knee was injected from an anterolateral approach with 1 ml of Kenalog and 4 ml Lidocaine each under sterile technique using a 22 gauge needle. The needle was removed and the area cleansed and dressed.  Complications:  Patient tolerated the procedure well.    he was advised to rest the knee today, using ice and elevation as needed for comfort and swelling.Immediate relief of the knee pain may be short lived and secondary to the lidocaine. he may have an increase in discomfort tonight followed by steady improvement over the next several days. It may take 1-2  weeks following the injection to get the full benefit of the medication.

## 2020-06-24 RX ORDER — TRIAMCINOLONE ACETONIDE 40 MG/ML
80 INJECTION, SUSPENSION INTRA-ARTICULAR; INTRAMUSCULAR
Status: COMPLETED | OUTPATIENT
Start: 2020-06-23 | End: 2020-06-23

## 2020-07-16 ENCOUNTER — OFFICE VISIT (OUTPATIENT)
Dept: OPTOMETRY | Facility: CLINIC | Age: 80
End: 2020-07-16
Payer: MEDICARE

## 2020-07-16 DIAGNOSIS — H02.053 TRICHIASIS OF RIGHT EYE WITHOUT ENTROPION: ICD-10-CM

## 2020-07-16 DIAGNOSIS — H25.13 NUCLEAR SCLEROSIS OF BOTH EYES: Primary | ICD-10-CM

## 2020-07-16 DIAGNOSIS — H52.4 PRESBYOPIA: ICD-10-CM

## 2020-07-16 DIAGNOSIS — H25.043 POSTERIOR SUBCAPSULAR AGE-RELATED CATARACT, BOTH EYES: ICD-10-CM

## 2020-07-16 PROCEDURE — 92014 COMPRE OPH EXAM EST PT 1/>: CPT | Mod: 25,HCNC,S$GLB, | Performed by: OPTOMETRIST

## 2020-07-16 PROCEDURE — 67820 PR REVISE EYELASHES,FORCEPS: ICD-10-PCS | Mod: HCNC,RT,S$GLB, | Performed by: OPTOMETRIST

## 2020-07-16 PROCEDURE — 99999 PR PBB SHADOW E&M-EST. PATIENT-LVL II: ICD-10-PCS | Mod: PBBFAC,HCNC,, | Performed by: OPTOMETRIST

## 2020-07-16 PROCEDURE — 99999 PR PBB SHADOW E&M-EST. PATIENT-LVL II: CPT | Mod: PBBFAC,HCNC,, | Performed by: OPTOMETRIST

## 2020-07-16 PROCEDURE — 67820 REVISE EYELASHES: CPT | Mod: HCNC,RT,S$GLB, | Performed by: OPTOMETRIST

## 2020-07-16 PROCEDURE — 92014 PR EYE EXAM, EST PATIENT,COMPREHESV: ICD-10-PCS | Mod: 25,HCNC,S$GLB, | Performed by: OPTOMETRIST

## 2020-07-16 NOTE — PROGRESS NOTES
HPI     Last eye exam was 5/31/18 with   Pt here for routine eye exam.    Pt sattes he is doing well   Pt states he has cataract sx and would like them removed. Pt would like to   wait on glasses until cataracts are out.  Pt states he has been having a lot of floaters.   Pt states he has a bump on the corner lid of OD, pt states it is   bothersome.         Last edited by Leah Dorsey on 7/16/2020  1:18 PM. (History)            Assessment /Plan     For exam results, see Encounter Report.    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  Educated on cataracts and affects on vision.  Patient unhappy with vision.  Consult Dr. Morin for cataract surgery  3.  Small eyelash on RLL sticking up and poking RUL while blinking.  Used jewelers to remove eyelash without complications.  Apply Erythromycin davy QHS on outer corner x 3 days--skin dry and irritated.  4.   Continue w/ current rx

## 2020-08-19 ENCOUNTER — OFFICE VISIT (OUTPATIENT)
Dept: OPHTHALMOLOGY | Facility: CLINIC | Age: 80
End: 2020-08-19
Payer: MEDICARE

## 2020-08-19 DIAGNOSIS — Z01.818 PRE-OP TESTING: ICD-10-CM

## 2020-08-19 DIAGNOSIS — H25.13 NUCLEAR SCLEROSIS, BILATERAL: Primary | ICD-10-CM

## 2020-08-19 PROCEDURE — 99999 PR PBB SHADOW E&M-EST. PATIENT-LVL III: CPT | Mod: PBBFAC,HCNC,, | Performed by: OPHTHALMOLOGY

## 2020-08-19 PROCEDURE — 99999 PR PBB SHADOW E&M-EST. PATIENT-LVL III: ICD-10-PCS | Mod: PBBFAC,HCNC,, | Performed by: OPHTHALMOLOGY

## 2020-08-19 PROCEDURE — 92014 PR EYE EXAM, EST PATIENT,COMPREHESV: ICD-10-PCS | Mod: HCNC,S$GLB,, | Performed by: OPHTHALMOLOGY

## 2020-08-19 PROCEDURE — 92014 COMPRE OPH EXAM EST PT 1/>: CPT | Mod: HCNC,S$GLB,, | Performed by: OPHTHALMOLOGY

## 2020-08-19 PROCEDURE — 92136 OPHTHALMIC BIOMETRY: CPT | Mod: HCNC,LT,S$GLB, | Performed by: OPHTHALMOLOGY

## 2020-08-19 PROCEDURE — 92136 IOL MASTER - OU - BOTH EYES: ICD-10-PCS | Mod: HCNC,LT,S$GLB, | Performed by: OPHTHALMOLOGY

## 2020-08-19 RX ORDER — TETRACAINE HYDROCHLORIDE 5 MG/ML
1 SOLUTION OPHTHALMIC
Status: CANCELLED | OUTPATIENT
Start: 2020-08-19

## 2020-08-19 RX ORDER — MOXIFLOXACIN 5 MG/ML
1 SOLUTION/ DROPS OPHTHALMIC
Status: CANCELLED | OUTPATIENT
Start: 2020-08-19

## 2020-08-19 RX ORDER — PREDNISOLONE ACETATE-GATIFLOXACIN-BROMFENAC .75; 5; 1 MG/ML; MG/ML; MG/ML
1 SUSPENSION/ DROPS OPHTHALMIC 3 TIMES DAILY
Qty: 5 ML | Refills: 3 | Status: SHIPPED | OUTPATIENT
Start: 2020-08-19 | End: 2022-02-02

## 2020-08-19 RX ORDER — PHENYLEPHRINE HYDROCHLORIDE 25 MG/ML
1 SOLUTION/ DROPS OPHTHALMIC
Status: CANCELLED | OUTPATIENT
Start: 2020-08-19

## 2020-08-19 RX ORDER — SODIUM CHLORIDE 0.9 % (FLUSH) 0.9 %
10 SYRINGE (ML) INJECTION
Status: DISCONTINUED | OUTPATIENT
Start: 2020-08-19 | End: 2022-06-06 | Stop reason: CLARIF

## 2020-08-19 RX ORDER — TROPICAMIDE 10 MG/ML
1 SOLUTION/ DROPS OPHTHALMIC
Status: CANCELLED | OUTPATIENT
Start: 2020-08-19

## 2020-08-19 NOTE — LETTER
August 19, 2020      Afia Aguirre, OD  [address]  [facility] [address]           [facility] [address]  [address]  [facility] [address]  Phone: [phone]  Fax: [phone]          Patient: Herber Camarillo   MR Number: [id number]   YOB: 1940   Date of Visit: 8/19/2020       Dear Dr. Afia Aguirre:    Thank you for referring Herber Camarillo to me for evaluation. Attached you will find relevant portions of my assessment and plan of care.    If you have questions, please do not hesitate to call me. I look forward to following Herber Camarillo along with you.    Sincerely,    Nam Morin MD    Enclosure  CC:  No Recipients    If you would like to receive this communication electronically, please contact [email or web address] or [phone] to request more information on [facility] access.    For providers and/or their staff who would like to refer a patient to [facility], please contact us through our one-stop-shop provider referral line, [facility], at [phone].    If you feel you have received this communication in error or would no longer like to receive these types of communications, please e-mail [email or web address]

## 2020-08-19 NOTE — PROGRESS NOTES
HPI     79 YO male presents today for a cataract eval referred by Dr. Aguirre. He   states that he is doing well, notes that his vision is getting very foggy   OU and he does have floaters.     Last edited by Marlen Johnston, PCT on 8/19/2020  2:21 PM. (History)            Assessment /Plan     For exam results, see Encounter Report.    Nuclear sclerosis, bilateral      Visually Significant Cataract: Patient reports decreased vision consistent with the clinical amount of lenticular opacity, which reaches the level of visual significance and affects activities of daily living. Risks, benefits, and alternatives to cataract surgery were discussed and the consent reviewed. IOL options were discussed, including ATIOLs and the associated side effects and additional patient cost associated with them.   IOL Selections:   Right eye  IOL: TFNT40 16.5 vs 17.0 at 33     Left eye  IOL: TFNT30 16.5 at 133    Pt wishes to have left eye done first.  The patient expresses a desire to reduce spectacle dependence. I reviewed various IOL and LASER refractive surgical options and we will attempt to minimize spectacle dependence by managing astigmatism and optimizing IOL selection. Femtosecond LASER assisted cataract surgery (FLACS) technology was explained to the patient with educational videos and discussion.  The patient voices understanding and wishes to implement this technology during the cataract procedure.  I explained the increased precision of the LASER versus manual techniques, especially as it relates to astigmatism reduction with arcuate incisions.  I emphasized that although our goal is to reduce the need for refractive correction after surgery, there may still be a need for spectacle correction to achieve optimal visual acuity, and that a reasonable range of functional vision should be the expectation.  No guarantees are made about post operative refraction or visual acuity, as the eye may heal in unpredictable ways, and the  standard risks, benefits, and alternatives to cataract surgery were explained.  The patient understands that the refractive portions of this cataract procedure are not covered by insurance, and that there is an out of pocket expense of $2250 per eye. I also explained that even though our pre-operative plan is to utilize advanced refractive technologies during surgery, that I may decide to eliminate part or all of this plan if surgical challenges or complications arise, or I feel that it is not in the patient's best interest. Consent forms and an ABN form were given to the patient to review.    Catalys Parameters:  Right Eye:   LUCY:  12mm   ?Need to milo patient sitting up?: Yes  Capsulotomy: Scanned Capsule   rdGrdrrdarddrderd:rd rd3rd Arcuate:  Toric Milo: at  Axis: 33   Incisions: OFF  Left Eye:   LUCY:  12mm   ?Need to milo patient sitting up?: Yes  Capsulotomy: Scanned Capsule  rdGrdrrdarddrderd:rd rd3rd Arcuate: Toric Milo: at  Axis: 133   Incisions:  OFF

## 2020-08-20 ENCOUNTER — TELEPHONE (OUTPATIENT)
Dept: OPHTHALMOLOGY | Facility: CLINIC | Age: 80
End: 2020-08-20

## 2020-08-20 DIAGNOSIS — H25.11 NUCLEAR SCLEROTIC CATARACT OF RIGHT EYE: Primary | ICD-10-CM

## 2020-08-20 DIAGNOSIS — H25.12 NUCLEAR SCLEROTIC CATARACT OF LEFT EYE: Primary | ICD-10-CM

## 2020-09-10 ENCOUNTER — TELEPHONE (OUTPATIENT)
Dept: OPHTHALMOLOGY | Facility: CLINIC | Age: 80
End: 2020-09-10

## 2020-09-10 NOTE — TELEPHONE ENCOUNTER
Spoke with patient. Told them their surgery arrival time, which is 8am, on 9/14/2020. Nothing to eat or drink after 9pm, the night before surgery. May have water, gatorade, or powerade after 9pm, until leaving home the morning of surgery. Start drops on Saturday, one drop TID into operative eye. 9/10/20 TR

## 2020-09-11 ENCOUNTER — LAB VISIT (OUTPATIENT)
Dept: SURGERY | Facility: CLINIC | Age: 80
End: 2020-09-11
Payer: MEDICARE

## 2020-09-11 DIAGNOSIS — Z01.818 PRE-OP TESTING: ICD-10-CM

## 2020-09-11 LAB — SARS-COV-2 RNA RESP QL NAA+PROBE: NOT DETECTED

## 2020-09-11 PROCEDURE — U0003 INFECTIOUS AGENT DETECTION BY NUCLEIC ACID (DNA OR RNA); SEVERE ACUTE RESPIRATORY SYNDROME CORONAVIRUS 2 (SARS-COV-2) (CORONAVIRUS DISEASE [COVID-19]), AMPLIFIED PROBE TECHNIQUE, MAKING USE OF HIGH THROUGHPUT TECHNOLOGIES AS DESCRIBED BY CMS-2020-01-R: HCPCS | Mod: HCNC

## 2020-09-16 ENCOUNTER — TELEPHONE (OUTPATIENT)
Dept: OPHTHALMOLOGY | Facility: CLINIC | Age: 80
End: 2020-09-16

## 2020-09-16 DIAGNOSIS — Z01.818 PRE-OP TESTING: Primary | ICD-10-CM

## 2020-09-16 NOTE — TELEPHONE ENCOUNTER
----- Message from Mary Frank sent at 9/16/2020  9:46 AM CDT -----  Regarding: Cataract surgery  Contact: Herber Vincent calling to confirm if we were able to reschedule his surgery that was set up this past Monday due to him having to cancel. Please contact him at [phone]

## 2020-09-17 ENCOUNTER — IMMUNIZATION (OUTPATIENT)
Dept: PHARMACY | Facility: CLINIC | Age: 80
End: 2020-09-17
Payer: MEDICARE

## 2020-09-24 ENCOUNTER — TELEPHONE (OUTPATIENT)
Dept: OPHTHALMOLOGY | Facility: CLINIC | Age: 80
End: 2020-09-24

## 2020-09-24 NOTE — TELEPHONE ENCOUNTER
Spoke with patient. Told them their surgery arrival time, which is 8am, on 9/28/2020. Nothing to eat or drink after 9pm, the night before surgery. May have water, gatorade, or powerade after 9pm, until leaving home the morning of surgery. Start drops on Saturday, one drop TID into operative eye. 9/24/20 TR

## 2020-09-25 ENCOUNTER — LAB VISIT (OUTPATIENT)
Dept: SURGERY | Facility: CLINIC | Age: 80
End: 2020-09-25
Payer: MEDICARE

## 2020-09-25 DIAGNOSIS — Z01.818 PRE-OP TESTING: ICD-10-CM

## 2020-09-25 LAB — SARS-COV-2 RNA RESP QL NAA+PROBE: NOT DETECTED

## 2020-09-25 PROCEDURE — U0003 INFECTIOUS AGENT DETECTION BY NUCLEIC ACID (DNA OR RNA); SEVERE ACUTE RESPIRATORY SYNDROME CORONAVIRUS 2 (SARS-COV-2) (CORONAVIRUS DISEASE [COVID-19]), AMPLIFIED PROBE TECHNIQUE, MAKING USE OF HIGH THROUGHPUT TECHNOLOGIES AS DESCRIBED BY CMS-2020-01-R: HCPCS | Mod: HCNC

## 2020-09-28 ENCOUNTER — ANESTHESIA (OUTPATIENT)
Dept: SURGERY | Facility: OTHER | Age: 80
End: 2020-09-28
Payer: MEDICARE

## 2020-09-28 ENCOUNTER — HOSPITAL ENCOUNTER (OUTPATIENT)
Facility: OTHER | Age: 80
Discharge: HOME | End: 2020-09-28
Attending: OPHTHALMOLOGY | Admitting: OPHTHALMOLOGY
Payer: MEDICARE

## 2020-09-28 ENCOUNTER — ANESTHESIA EVENT (OUTPATIENT)
Dept: SURGERY | Facility: OTHER | Age: 80
End: 2020-09-28
Payer: MEDICARE

## 2020-09-28 VITALS
HEIGHT: 68 IN | BODY MASS INDEX: 30.31 KG/M2 | RESPIRATION RATE: 16 BRPM | DIASTOLIC BLOOD PRESSURE: 70 MMHG | TEMPERATURE: 98 F | HEART RATE: 60 BPM | OXYGEN SATURATION: 98 % | SYSTOLIC BLOOD PRESSURE: 136 MMHG | WEIGHT: 200 LBS

## 2020-09-28 DIAGNOSIS — H25.13 NUCLEAR SCLEROSIS, BILATERAL: ICD-10-CM

## 2020-09-28 PROCEDURE — 36000706: Mod: HCNC | Performed by: OPHTHALMOLOGY

## 2020-09-28 PROCEDURE — 37000009 HC ANESTHESIA EA ADD 15 MINS: Mod: HCNC | Performed by: OPHTHALMOLOGY

## 2020-09-28 PROCEDURE — A4216 STERILE WATER/SALINE, 10 ML: HCPCS | Mod: HCNC | Performed by: NURSE ANESTHETIST, CERTIFIED REGISTERED

## 2020-09-28 PROCEDURE — 66984 PR REMOVAL, CATARACT, W/INSRT INTRAOC LENS, W/O ENDO CYCLO: ICD-10-PCS | Mod: HCNC,LT,, | Performed by: OPHTHALMOLOGY

## 2020-09-28 PROCEDURE — 66984 XCAPSL CTRC RMVL W/O ECP: CPT | Mod: HCNC,LT,, | Performed by: OPHTHALMOLOGY

## 2020-09-28 PROCEDURE — 71000015 HC POSTOP RECOV 1ST HR: Mod: HCNC | Performed by: OPHTHALMOLOGY

## 2020-09-28 PROCEDURE — 25000003 PHARM REV CODE 250: Mod: HCNC | Performed by: OPHTHALMOLOGY

## 2020-09-28 PROCEDURE — 66999 UNLISTED PX ANT SEGMENT EYE: CPT | Mod: CSM,HCNC,LT, | Performed by: OPHTHALMOLOGY

## 2020-09-28 PROCEDURE — 25000003 PHARM REV CODE 250: Mod: HCNC | Performed by: NURSE ANESTHETIST, CERTIFIED REGISTERED

## 2020-09-28 PROCEDURE — 37000008 HC ANESTHESIA 1ST 15 MINUTES: Mod: HCNC | Performed by: OPHTHALMOLOGY

## 2020-09-28 PROCEDURE — 66999 PR FEMTO MFIOL: ICD-10-PCS | Mod: CSM,HCNC,LT, | Performed by: OPHTHALMOLOGY

## 2020-09-28 PROCEDURE — 36000707: Mod: HCNC | Performed by: OPHTHALMOLOGY

## 2020-09-28 PROCEDURE — V2788 PRESBYOPIA-CORRECT FUNCTION: HCPCS | Mod: HCNC,WS | Performed by: OPHTHALMOLOGY

## 2020-09-28 PROCEDURE — 63600175 PHARM REV CODE 636 W HCPCS: Mod: HCNC | Performed by: NURSE ANESTHETIST, CERTIFIED REGISTERED

## 2020-09-28 DEVICE — IMPLANTABLE DEVICE: Type: IMPLANTABLE DEVICE | Site: EYE | Status: FUNCTIONAL

## 2020-09-28 RX ORDER — PROPARACAINE HYDROCHLORIDE 5 MG/ML
1 SOLUTION/ DROPS OPHTHALMIC
Status: DISCONTINUED | OUTPATIENT
Start: 2020-09-28 | End: 2020-09-28 | Stop reason: HOSPADM

## 2020-09-28 RX ORDER — PHENYLEPHRINE HYDROCHLORIDE 25 MG/ML
1 SOLUTION/ DROPS OPHTHALMIC
Status: COMPLETED | OUTPATIENT
Start: 2020-09-28 | End: 2020-09-28

## 2020-09-28 RX ORDER — LIDOCAINE HYDROCHLORIDE 40 MG/ML
INJECTION, SOLUTION RETROBULBAR
Status: DISCONTINUED | OUTPATIENT
Start: 2020-09-28 | End: 2020-09-28 | Stop reason: HOSPADM

## 2020-09-28 RX ORDER — MIDAZOLAM HYDROCHLORIDE 1 MG/ML
INJECTION INTRAMUSCULAR; INTRAVENOUS
Status: DISCONTINUED | OUTPATIENT
Start: 2020-09-28 | End: 2020-09-28

## 2020-09-28 RX ORDER — TETRACAINE HYDROCHLORIDE 5 MG/ML
SOLUTION OPHTHALMIC
Status: DISCONTINUED | OUTPATIENT
Start: 2020-09-28 | End: 2020-09-28 | Stop reason: HOSPADM

## 2020-09-28 RX ORDER — MOXIFLOXACIN 5 MG/ML
SOLUTION/ DROPS OPHTHALMIC
Status: DISCONTINUED | OUTPATIENT
Start: 2020-09-28 | End: 2020-09-28 | Stop reason: HOSPADM

## 2020-09-28 RX ORDER — MOXIFLOXACIN 5 MG/ML
1 SOLUTION/ DROPS OPHTHALMIC
Status: COMPLETED | OUTPATIENT
Start: 2020-09-28 | End: 2020-09-28

## 2020-09-28 RX ORDER — PHENYLEPHRINE HYDROCHLORIDE 100 MG/ML
1 SOLUTION/ DROPS OPHTHALMIC
Status: COMPLETED | OUTPATIENT
Start: 2020-09-28 | End: 2020-09-28

## 2020-09-28 RX ORDER — SODIUM CHLORIDE 0.9 % (FLUSH) 0.9 %
SYRINGE (ML) INJECTION
Status: DISCONTINUED | OUTPATIENT
Start: 2020-09-28 | End: 2020-09-28

## 2020-09-28 RX ORDER — ACETAMINOPHEN 325 MG/1
650 TABLET ORAL EVERY 4 HOURS PRN
Status: DISCONTINUED | OUTPATIENT
Start: 2020-09-28 | End: 2020-09-28 | Stop reason: HOSPADM

## 2020-09-28 RX ORDER — TROPICAMIDE 10 MG/ML
1 SOLUTION/ DROPS OPHTHALMIC
Status: COMPLETED | OUTPATIENT
Start: 2020-09-28 | End: 2020-09-28

## 2020-09-28 RX ORDER — TETRACAINE HYDROCHLORIDE 5 MG/ML
1 SOLUTION OPHTHALMIC
Status: COMPLETED | OUTPATIENT
Start: 2020-09-28 | End: 2020-09-28

## 2020-09-28 RX ADMIN — MOXIFLOXACIN 1 DROP: 5 SOLUTION/ DROPS OPHTHALMIC at 10:09

## 2020-09-28 RX ADMIN — PHENYLEPHRINE HYDROCHLORIDE 1 DROP: 25 SOLUTION/ DROPS OPHTHALMIC at 08:09

## 2020-09-28 RX ADMIN — TETRACAINE HYDROCHLORIDE 1 DROP: 5 SOLUTION OPHTHALMIC at 08:09

## 2020-09-28 RX ADMIN — TROPICAMIDE 1 DROP: 10 SOLUTION/ DROPS OPHTHALMIC at 07:09

## 2020-09-28 RX ADMIN — MOXIFLOXACIN 1 DROP: 5 SOLUTION/ DROPS OPHTHALMIC at 08:09

## 2020-09-28 RX ADMIN — TROPICAMIDE 1 DROP: 10 SOLUTION/ DROPS OPHTHALMIC at 08:09

## 2020-09-28 RX ADMIN — TETRACAINE HYDROCHLORIDE 1 DROP: 5 SOLUTION OPHTHALMIC at 07:09

## 2020-09-28 RX ADMIN — MIDAZOLAM HYDROCHLORIDE 1 MG: 1 INJECTION, SOLUTION INTRAMUSCULAR; INTRAVENOUS at 09:09

## 2020-09-28 RX ADMIN — MOXIFLOXACIN 1 DROP: 5 SOLUTION/ DROPS OPHTHALMIC at 07:09

## 2020-09-28 RX ADMIN — PHENYLEPHRINE HYDROCHLORIDE 1 DROP: 25 SOLUTION/ DROPS OPHTHALMIC at 07:09

## 2020-09-28 RX ADMIN — SODIUM CHLORIDE, PRESERVATIVE FREE 10 ML: 5 INJECTION INTRAVENOUS at 09:09

## 2020-09-28 NOTE — ANESTHESIA PREPROCEDURE EVALUATION
09/28/2020  Herber Caamrillo is a 80 y.o., male.    Anesthesia Evaluation       I have reviewed the Medications.     Review of Systems  Anesthesia Hx:  Denies Family Hx of Anesthesia complications.   Social:  Non-Smoker    Hematology/Oncology:  Hematology Normal       -- Cancer in past history (prostate):    EENT/Dental:EENT/Dental Normal   Cardiovascular:   Past MI (20 years ago) CAD asymptomatic CABG/stent (stent)     Pulmonary:  Pulmonary Normal    Renal/:  Renal/ Normal     Hepatic/GI:  Hepatic/GI Normal    Musculoskeletal:   Arthritis     Neurological:  Neurology Normal    Endocrine:  Endocrine Normal    Dermatological:  Skin Normal    Psych:  Psychiatric Normal           Physical Exam  General:  Well nourished    Airway/Jaw/Neck:  Airway Findings: Mouth Opening: Normal Tongue: Normal  General Airway Assessment: Adult  Jaw/Neck Findings:  Neck ROM: Extension Decreased, Mild, Extension Painful      Dental:  Dental Findings: lower partial dentures, Upper Dentures        Mental Status:  Mental Status Findings:  Cooperative, Alert and Oriented         Anesthesia Plan  Type of Anesthesia, risks & benefits discussed:  Anesthesia Type:  MAC  Patient's Preference:   Intra-op Monitoring Plan: standard ASA monitors  Intra-op Monitoring Plan Comments:   Post Op Pain Control Plan: per primary service following discharge from PACU  Post Op Pain Control Plan Comments:   Induction:    Beta Blocker:         Informed Consent: Patient understands risks and agrees with Anesthesia plan.  Questions answered. Anesthesia consent signed with patient.  ASA Score: 3     Day of Surgery Review of History & Physical:    H&P update referred to the surgeon.         Ready For Surgery From Anesthesia Perspective.

## 2020-09-28 NOTE — OP NOTE
SURGEON:  Nam Morin M.D.    PREOPERATIVE DIAGNOSIS:    Nuclear Sclerotic Cataract Left Eye    POSTOPERATIVE DIAGNOSIS:    Nuclear Sclerotic Cataract Left Eye    PROCEDURES:    Phacoemulsification with  intraocular lens, Left eye (17413)  With Femtosecond LASER assist    DATE OF SURGERY: 09/28/2020    IMPLANT: TFNT30 16.5    ANESTHESIA:  MAC with topical Lidocaine    COMPLICATIONS:  None    ESTIMATED BLOOD LOSS: None    SPECIMENS: None    INDICATIONS:    The patient has a history of painless progressive visual loss and  difficulty with activities of daily living secondary to cataract formation.  After a thorough discussion of the risks, benefits, and alternatives to cataract surgery, including, but not limited to, the rare risks of infection, retinal detachment, hemorrhage, need for additional surgery, loss of vision, and even loss of the eye, the patient voices understanding and desires to proceed.    DESCRIPTION OF PROCEDURE:    After verification of consent and marking of the operative eye, the patient was positioned under the femtosecond LASER. Topical anesthetic drops were administered. A surgical timeout was initiated with verification of patient identifiers and the laser surgical plan. The eye was docked securely and the laser portion of the cataract procedure was carried out without complication.  The patient was returned to the pre-operative area to await the intraocular surgical portion of the cataract procedure.  The patients IOL calculations were reviewed, and the lens selection confirmed.   After verification and marking of the proper eye in the preop holding area, the patient was brought to the operating room in supine position where the eye was prepped and draped in standard sterile fashion with 5% Betadine and a lid speculum placed in the eye.   Topical 4% Lidocaine was used in addition to the preoperative anesthesia and the procedure was begun by the creation of a paracentesis incision through  which viscoelastic was used to fill the anterior chamber.  Next, a keratome blade was used to create a triplanar temporal clear corneal incision and a cystotome and Utrata forceps used to fashion a continuous curvilinear capsulorrhexis.  Hydrodissection was carried out using the Ellington hydrodissection cannula and the nucleus was found to be mobile.  Phacoemulsification of the nucleus was carried out using a quick chop technique, and all remaining epinuclear and cortical material was removed.  The eye was then reformed with Viscoelastic and the  intraocular lens was implanted into the capsular bag.  All remaining viscoelastics were removed from the eye and at the end of the case the pupil was round, the lens was well-centered within the capsular bag and all wounds were found to be water tight.  Drops of Vigamox and Pred Forte were instilled and a shield was placed over the eye. The patient will follow up with Dr. Morin in the morning.

## 2020-09-28 NOTE — DISCHARGE INSTRUCTIONS
Anesthesia: Monitored Anesthesia Care (MAC)  Anesthesia safety  Tips for anesthesia safety include the following:   · Follow all instructions you are given for how long not to eat or drink before your procedure.  · Be sure your healthcare provider knows what medicines you take, especially any anti-inflammatory medicine or blood thinners. This includes aspirin and any other over-the-counter medicines, herbs, and supplements.  · Have an adult family member or friend drive you home after the procedure.  · For the first 24 hours after your surgery:  ¨ Do not drive or use heavy equipment.  ¨ Do not make important decisions or sign documents.  ¨ Avoid alcohol.  ¨ Have someone stay with you, if possible. They can watch for problems and help keep you safe.  Date Last Reviewed: 12/1/2016 © 2000-2017 [employer]. [address] [place] [address], [place] [address]. All rights reserved. This information is not intended as a substitute for professional medical care. Always follow your healthcare professional's instructions.

## 2020-09-28 NOTE — DISCHARGE SUMMARY
Outcome: Successful outpatient ophthalmic surgical procedure  Preprinted Instructions given to patient.  Regular diet.  Activity: No restrictions  Meds: see Med Rec  Condition: stable  Follow up: 1 day with Dr Morin  Disposition: Home  Diagnosis: s/p eye surgery   No

## 2020-09-28 NOTE — PLAN OF CARE
Herber Camarillo has met all discharge criteria from Phase II. Vital Signs are stable, ambulating  without difficulty. Discharge instructions given, patient verbalized understanding. Discharged from facility via wheelchair in stable condition.

## 2020-09-29 ENCOUNTER — OFFICE VISIT (OUTPATIENT)
Dept: OPHTHALMOLOGY | Facility: CLINIC | Age: 80
End: 2020-09-29
Attending: OPHTHALMOLOGY
Payer: MEDICARE

## 2020-09-29 DIAGNOSIS — Z98.890 POST-OPERATIVE STATE: Primary | ICD-10-CM

## 2020-09-29 DIAGNOSIS — H25.13 NUCLEAR SCLEROSIS, BILATERAL: ICD-10-CM

## 2020-09-29 PROCEDURE — 99999 PR PBB SHADOW E&M-EST. PATIENT-LVL II: ICD-10-PCS | Mod: PBBFAC,HCNC,, | Performed by: OPHTHALMOLOGY

## 2020-09-29 PROCEDURE — 99024 PR POST-OP FOLLOW-UP VISIT: ICD-10-PCS | Mod: HCNC,S$GLB,, | Performed by: OPHTHALMOLOGY

## 2020-09-29 PROCEDURE — 99024 POSTOP FOLLOW-UP VISIT: CPT | Mod: HCNC,S$GLB,, | Performed by: OPHTHALMOLOGY

## 2020-09-29 PROCEDURE — 99999 PR PBB SHADOW E&M-EST. PATIENT-LVL II: CPT | Mod: PBBFAC,HCNC,, | Performed by: OPHTHALMOLOGY

## 2020-09-29 NOTE — PROGRESS NOTES
HPI     POD1 Panoptix OS   Vision awesome    Last edited by Nam Morin MD on 9/29/2020  8:16 AM. (History)            Assessment /Plan     For exam results, see Encounter Report.    Post-operative state    Nuclear sclerosis, bilateral      Slit lamp exam:  L/L: nl  K: clear, wound sealed  AC: 1+ cell  Lens: IOL centered and stable    POD1 s/p Phaco/IOL  Appropriate precautions and post op medications reviewed.  Patient instructed to call or come in if symptoms of redness, decreased vision, or pain are experienced.  Excellent Panotpix result

## 2020-10-07 ENCOUNTER — OFFICE VISIT (OUTPATIENT)
Dept: OPHTHALMOLOGY | Facility: CLINIC | Age: 80
End: 2020-10-07
Payer: MEDICARE

## 2020-10-07 DIAGNOSIS — Z98.890 POST-OPERATIVE STATE: ICD-10-CM

## 2020-10-07 DIAGNOSIS — H25.13 NUCLEAR SCLEROSIS, BILATERAL: Primary | ICD-10-CM

## 2020-10-07 PROCEDURE — 92136 IOL MASTER - OU - BOTH EYES: ICD-10-PCS | Mod: 26,HCNC,RT,S$GLB | Performed by: OPHTHALMOLOGY

## 2020-10-07 PROCEDURE — 99999 PR PBB SHADOW E&M-EST. PATIENT-LVL III: CPT | Mod: PBBFAC,HCNC,, | Performed by: OPHTHALMOLOGY

## 2020-10-07 PROCEDURE — 99024 POSTOP FOLLOW-UP VISIT: CPT | Mod: HCNC,S$GLB,, | Performed by: OPHTHALMOLOGY

## 2020-10-07 PROCEDURE — 99024 PR POST-OP FOLLOW-UP VISIT: ICD-10-PCS | Mod: HCNC,S$GLB,, | Performed by: OPHTHALMOLOGY

## 2020-10-07 PROCEDURE — 99999 PR PBB SHADOW E&M-EST. PATIENT-LVL III: ICD-10-PCS | Mod: PBBFAC,HCNC,, | Performed by: OPHTHALMOLOGY

## 2020-10-07 PROCEDURE — 92136 OPHTHALMIC BIOMETRY: CPT | Mod: 26,HCNC,RT,S$GLB | Performed by: OPHTHALMOLOGY

## 2020-10-07 RX ORDER — TROPICAMIDE 10 MG/ML
1 SOLUTION/ DROPS OPHTHALMIC
Status: CANCELLED | OUTPATIENT
Start: 2020-10-07

## 2020-10-07 RX ORDER — MOXIFLOXACIN 5 MG/ML
1 SOLUTION/ DROPS OPHTHALMIC
Status: CANCELLED | OUTPATIENT
Start: 2020-10-07

## 2020-10-07 RX ORDER — PHENYLEPHRINE HYDROCHLORIDE 25 MG/ML
1 SOLUTION/ DROPS OPHTHALMIC
Status: CANCELLED | OUTPATIENT
Start: 2020-10-07

## 2020-10-07 RX ORDER — TETRACAINE HYDROCHLORIDE 5 MG/ML
1 SOLUTION OPHTHALMIC
Status: CANCELLED | OUTPATIENT
Start: 2020-10-07

## 2020-10-07 RX ORDER — SODIUM CHLORIDE 0.9 % (FLUSH) 0.9 %
10 SYRINGE (ML) INJECTION
Status: DISCONTINUED | OUTPATIENT
Start: 2020-10-07 | End: 2022-06-06 | Stop reason: CLARIF

## 2020-10-07 NOTE — PROGRESS NOTES
HPI     POW1 Panoptix OS   Vision awesome    Drops:   PGB TID OS     small floater OS         Last edited by Jovan Tai on 10/7/2020 10:18 AM. (History)            Assessment /Plan     For exam results, see Encounter Report.    Nuclear sclerosis, bilateral    Post-operative state      Slit lamp exam:  L/L: nl  K: clear, wound sealed  AC: trace cell  Iris/Lens: IOL centered and stable    POW1 s/p phaco: Surgery healing well with no signs of infection or abnormal inflammation.    Patient wishes to proceed with surgery in the second eye. Risks, benefits, alternatives reviewed. IOL selection reviewed.     Right eye  IOL: TFNT40 16.5 vs 17.0 at 33  Good correlation OS  The patient expresses a desire to reduce spectacle dependence. I reviewed various IOL and LASER refractive surgical options and we will attempt to minimize spectacle dependence by managing astigmatism and optimizing IOL selection. Femtosecond LASER assisted cataract surgery (FLACS) technology was explained to the patient with educational videos and discussion.  The patient voices understanding and wishes to implement this technology during the cataract procedure.  I explained the increased precision of the LASER versus manual techniques, especially as it relates to astigmatism reduction with arcuate incisions.  I emphasized that although our goal is to reduce the need for refractive correction after surgery, there may still be a need for spectacle correction to achieve optimal visual acuity, and that a reasonable range of functional vision should be the expectation.  No guarantees are made about post operative refraction or visual acuity, as the eye may heal in unpredictable ways, and the standard risks, benefits, and alternatives to cataract surgery were explained.  The patient understands that the refractive portions of this cataract procedure are not covered by insurance, and that there is an out of pocket expense of $2250 per eye. I also explained  that even though our pre-operative plan is to utilize advanced refractive technologies during surgery, that I may decide to eliminate part or all of this plan if surgical challenges or complications arise, or I feel that it is not in the patient's best interest. Consent forms and an ABN form were given to the patient to review.    Catalys Parameters:  Right Eye:   LUCY:  12mm   ?Need to milo patient sitting up?: Yes  Capsulotomy: Scanned Capsule   rdGrdrrdarddrderd:rd rd3rd Arcuate:  Toric Milo: at  Axis: 33   Incisions: OFF

## 2020-10-07 NOTE — H&P (VIEW-ONLY)
HPI     POW1 Panoptix OS   Vision awesome    Drops:   PGB TID OS     small floater OS         Last edited by Jovan Tai on 10/7/2020 10:18 AM. (History)            Assessment /Plan     For exam results, see Encounter Report.    Nuclear sclerosis, bilateral    Post-operative state      Slit lamp exam:  L/L: nl  K: clear, wound sealed  AC: trace cell  Iris/Lens: IOL centered and stable    POW1 s/p phaco: Surgery healing well with no signs of infection or abnormal inflammation.    Patient wishes to proceed with surgery in the second eye. Risks, benefits, alternatives reviewed. IOL selection reviewed.     Right eye  IOL: TFNT40 16.5 vs 17.0 at 33  Good correlation OS  The patient expresses a desire to reduce spectacle dependence. I reviewed various IOL and LASER refractive surgical options and we will attempt to minimize spectacle dependence by managing astigmatism and optimizing IOL selection. Femtosecond LASER assisted cataract surgery (FLACS) technology was explained to the patient with educational videos and discussion.  The patient voices understanding and wishes to implement this technology during the cataract procedure.  I explained the increased precision of the LASER versus manual techniques, especially as it relates to astigmatism reduction with arcuate incisions.  I emphasized that although our goal is to reduce the need for refractive correction after surgery, there may still be a need for spectacle correction to achieve optimal visual acuity, and that a reasonable range of functional vision should be the expectation.  No guarantees are made about post operative refraction or visual acuity, as the eye may heal in unpredictable ways, and the standard risks, benefits, and alternatives to cataract surgery were explained.  The patient understands that the refractive portions of this cataract procedure are not covered by insurance, and that there is an out of pocket expense of $2250 per eye. I also explained  that even though our pre-operative plan is to utilize advanced refractive technologies during surgery, that I may decide to eliminate part or all of this plan if surgical challenges or complications arise, or I feel that it is not in the patient's best interest. Consent forms and an ABN form were given to the patient to review.    Catalys Parameters:  Right Eye:   LUCY:  12mm   ?Need to milo patient sitting up?: Yes  Capsulotomy: Scanned Capsule   stGstrstastdstest:st st1st Arcuate:  Toric Milo: at  Axis: 33   Incisions: OFF

## 2020-10-08 ENCOUNTER — LAB VISIT (OUTPATIENT)
Dept: SURGERY | Facility: CLINIC | Age: 80
End: 2020-10-08
Payer: MEDICARE

## 2020-10-08 ENCOUNTER — TELEPHONE (OUTPATIENT)
Dept: OPHTHALMOLOGY | Facility: CLINIC | Age: 80
End: 2020-10-08

## 2020-10-08 DIAGNOSIS — Z01.818 PRE-OP TESTING: ICD-10-CM

## 2020-10-08 LAB — SARS-COV-2 RNA RESP QL NAA+PROBE: NOT DETECTED

## 2020-10-08 PROCEDURE — U0003 INFECTIOUS AGENT DETECTION BY NUCLEIC ACID (DNA OR RNA); SEVERE ACUTE RESPIRATORY SYNDROME CORONAVIRUS 2 (SARS-COV-2) (CORONAVIRUS DISEASE [COVID-19]), AMPLIFIED PROBE TECHNIQUE, MAKING USE OF HIGH THROUGHPUT TECHNOLOGIES AS DESCRIBED BY CMS-2020-01-R: HCPCS | Mod: HCNC

## 2020-10-08 NOTE — TELEPHONE ENCOUNTER
Spoke with patients wife.Told them their surgery arrival time, which is 8am, on 10/12/2020. Nothing to eat or drink after 9pm, the night before surgery. May have water, gatorade, or powerade after 9pm, until leaving home the morning of surgery. Start drops on Saturday, one drop TID into operative eye. 10/08/2020 TR

## 2020-10-11 ENCOUNTER — NURSE TRIAGE (OUTPATIENT)
Dept: ADMINISTRATIVE | Facility: CLINIC | Age: 80
End: 2020-10-11

## 2020-10-11 NOTE — TELEPHONE ENCOUNTER
Denies pain or fever, states he goes in, in a couple of days to have other eye done.  Able to speak in complete sentences without difficulty & denies needs.    Additional Information   Negative: Sounds like a life-threatening emergency to the triager   Negative: Discharged in past month from the hospital with a diagnosis of chronic obstructive pulmonic disease (COPD)   Negative: Discharged in past month from the hospital with a diagnosis of congestive heart failure (CHF) or heart failure (HF)   Negative: Discharged in past month from the hospital with a diagnosis of heart attack (myocardial infarction)   Negative: Discharged in past month from the hospital with a diagnosis of pneumonia   Negative: [1] Post-op AND [2] incision symptoms or questions   Negative: [1] Post-op AND [2] general symptoms or questions   Negative: Pain, redness, swelling, or pus at IV Site   Negative: IV not running or running slowly   Negative: Symptoms arising from use of a urinary catheter (James or Coude)   Negative: Medication question   Negative: [1] New symptom AND [2] not a possible complication of hospitalized condition   Negative: Patient sounds very sick or weak to the triager   Negative: Sounds like a serious complication to the triager   Negative: [1] Caller has URGENT question AND [2] triager unable to answer question   Negative: [1] Discharged from hospital within this past week AND [2] taking Coumadin (warfarin) AND [3] no INR testing performed within 5 days of discharge   Negative: [1] Caller has NON-URGENT question AND [2] triager unable to answer question   Negative: [1] Patient is improved AND [2] caller has simple question that triager can answer    Protocols used: POST-HOSPITALIZATION FOLLOW-UP CALL-A-

## 2020-10-12 ENCOUNTER — ANESTHESIA EVENT (OUTPATIENT)
Dept: SURGERY | Facility: OTHER | Age: 80
End: 2020-10-12
Payer: MEDICARE

## 2020-10-12 ENCOUNTER — HOSPITAL ENCOUNTER (OUTPATIENT)
Facility: OTHER | Age: 80
Discharge: HOME | End: 2020-10-12
Attending: OPHTHALMOLOGY | Admitting: OPHTHALMOLOGY
Payer: MEDICARE

## 2020-10-12 ENCOUNTER — ANESTHESIA (OUTPATIENT)
Dept: SURGERY | Facility: OTHER | Age: 80
End: 2020-10-12
Payer: MEDICARE

## 2020-10-12 VITALS
SYSTOLIC BLOOD PRESSURE: 136 MMHG | HEART RATE: 59 BPM | TEMPERATURE: 98 F | RESPIRATION RATE: 18 BRPM | DIASTOLIC BLOOD PRESSURE: 75 MMHG | WEIGHT: 200 LBS | OXYGEN SATURATION: 99 % | BODY MASS INDEX: 30.31 KG/M2 | HEIGHT: 68 IN

## 2020-10-12 DIAGNOSIS — H25.13 NUCLEAR SCLEROSIS, BILATERAL: ICD-10-CM

## 2020-10-12 PROCEDURE — V2788 PRESBYOPIA-CORRECT FUNCTION: HCPCS | Mod: HCNC,WS | Performed by: OPHTHALMOLOGY

## 2020-10-12 PROCEDURE — 66984 XCAPSL CTRC RMVL W/O ECP: CPT | Mod: 79,HCNC,RT, | Performed by: OPHTHALMOLOGY

## 2020-10-12 PROCEDURE — 66999 UNLISTED PX ANT SEGMENT EYE: CPT | Mod: CSM,HCNC,RT, | Performed by: OPHTHALMOLOGY

## 2020-10-12 PROCEDURE — 66984 PR REMOVAL, CATARACT, W/INSRT INTRAOC LENS, W/O ENDO CYCLO: ICD-10-PCS | Mod: 79,HCNC,RT, | Performed by: OPHTHALMOLOGY

## 2020-10-12 PROCEDURE — 37000008 HC ANESTHESIA 1ST 15 MINUTES: Mod: HCNC | Performed by: OPHTHALMOLOGY

## 2020-10-12 PROCEDURE — 36000707: Mod: HCNC | Performed by: OPHTHALMOLOGY

## 2020-10-12 PROCEDURE — 36000706: Mod: HCNC | Performed by: OPHTHALMOLOGY

## 2020-10-12 PROCEDURE — 25000003 PHARM REV CODE 250: Mod: HCNC | Performed by: OPHTHALMOLOGY

## 2020-10-12 PROCEDURE — 63600175 PHARM REV CODE 636 W HCPCS: Mod: HCNC | Performed by: NURSE ANESTHETIST, CERTIFIED REGISTERED

## 2020-10-12 PROCEDURE — 37000009 HC ANESTHESIA EA ADD 15 MINS: Mod: HCNC | Performed by: OPHTHALMOLOGY

## 2020-10-12 PROCEDURE — 71000015 HC POSTOP RECOV 1ST HR: Mod: HCNC | Performed by: OPHTHALMOLOGY

## 2020-10-12 PROCEDURE — 66999 PR FEMTO MFIOL: ICD-10-PCS | Mod: CSM,HCNC,RT, | Performed by: OPHTHALMOLOGY

## 2020-10-12 RX ORDER — LIDOCAINE HYDROCHLORIDE 40 MG/ML
INJECTION, SOLUTION RETROBULBAR
Status: DISCONTINUED | OUTPATIENT
Start: 2020-10-12 | End: 2020-10-12 | Stop reason: HOSPADM

## 2020-10-12 RX ORDER — TROPICAMIDE 10 MG/ML
1 SOLUTION/ DROPS OPHTHALMIC
Status: COMPLETED | OUTPATIENT
Start: 2020-10-12 | End: 2020-10-12

## 2020-10-12 RX ORDER — MIDAZOLAM HYDROCHLORIDE 1 MG/ML
INJECTION INTRAMUSCULAR; INTRAVENOUS
Status: DISCONTINUED | OUTPATIENT
Start: 2020-10-12 | End: 2020-10-12

## 2020-10-12 RX ORDER — MOXIFLOXACIN 5 MG/ML
SOLUTION/ DROPS OPHTHALMIC
Status: DISCONTINUED | OUTPATIENT
Start: 2020-10-12 | End: 2020-10-12 | Stop reason: HOSPADM

## 2020-10-12 RX ORDER — ACETAMINOPHEN 325 MG/1
650 TABLET ORAL EVERY 4 HOURS PRN
Status: DISCONTINUED | OUTPATIENT
Start: 2020-10-12 | End: 2020-10-12 | Stop reason: HOSPADM

## 2020-10-12 RX ORDER — MOXIFLOXACIN 5 MG/ML
1 SOLUTION/ DROPS OPHTHALMIC
Status: COMPLETED | OUTPATIENT
Start: 2020-10-12 | End: 2020-10-12

## 2020-10-12 RX ORDER — PROPARACAINE HYDROCHLORIDE 5 MG/ML
1 SOLUTION/ DROPS OPHTHALMIC
Status: DISCONTINUED | OUTPATIENT
Start: 2020-10-12 | End: 2020-10-12 | Stop reason: HOSPADM

## 2020-10-12 RX ORDER — TETRACAINE HYDROCHLORIDE 5 MG/ML
1 SOLUTION OPHTHALMIC
Status: COMPLETED | OUTPATIENT
Start: 2020-10-12 | End: 2020-10-12

## 2020-10-12 RX ORDER — PHENYLEPHRINE HYDROCHLORIDE 100 MG/ML
1 SOLUTION/ DROPS OPHTHALMIC ONCE
Status: COMPLETED | OUTPATIENT
Start: 2020-10-12 | End: 2020-10-12

## 2020-10-12 RX ORDER — TETRACAINE HYDROCHLORIDE 5 MG/ML
SOLUTION OPHTHALMIC
Status: DISCONTINUED | OUTPATIENT
Start: 2020-10-12 | End: 2020-10-12 | Stop reason: HOSPADM

## 2020-10-12 RX ORDER — PHENYLEPHRINE HYDROCHLORIDE 25 MG/ML
1 SOLUTION/ DROPS OPHTHALMIC
Status: COMPLETED | OUTPATIENT
Start: 2020-10-12 | End: 2020-10-12

## 2020-10-12 RX ADMIN — PHENYLEPHRINE HYDROCHLORIDE 1 DROP: 25 SOLUTION/ DROPS OPHTHALMIC at 07:10

## 2020-10-12 RX ADMIN — MIDAZOLAM HYDROCHLORIDE 2 MG: 1 INJECTION, SOLUTION INTRAMUSCULAR; INTRAVENOUS at 09:10

## 2020-10-12 RX ADMIN — TROPICAMIDE 1 DROP: 10 SOLUTION/ DROPS OPHTHALMIC at 07:10

## 2020-10-12 RX ADMIN — PHENYLEPHRINE HYDROCHLORIDE 1 DROP: 25 SOLUTION/ DROPS OPHTHALMIC at 08:10

## 2020-10-12 RX ADMIN — MOXIFLOXACIN 1 DROP: 5 SOLUTION/ DROPS OPHTHALMIC at 09:10

## 2020-10-12 RX ADMIN — MOXIFLOXACIN 1 DROP: 5 SOLUTION/ DROPS OPHTHALMIC at 08:10

## 2020-10-12 RX ADMIN — TROPICAMIDE 1 DROP: 10 SOLUTION/ DROPS OPHTHALMIC at 08:10

## 2020-10-12 RX ADMIN — TETRACAINE HYDROCHLORIDE 1 DROP: 5 SOLUTION OPHTHALMIC at 07:10

## 2020-10-12 RX ADMIN — MOXIFLOXACIN 1 DROP: 5 SOLUTION/ DROPS OPHTHALMIC at 07:10

## 2020-10-12 RX ADMIN — TETRACAINE HYDROCHLORIDE 1 DROP: 5 SOLUTION OPHTHALMIC at 08:10

## 2020-10-12 NOTE — OP NOTE
SURGEON:  Nam Morin M.D.    PREOPERATIVE DIAGNOSIS:    Nuclear Sclerotic Cataract Right Eye    POSTOPERATIVE DIAGNOSIS:    Nuclear Sclerotic Cataract Right Eye    PROCEDURES:    Phacoemulsification with  intraocular lens, Right eye (40834)  With Femtosecond LASER assist    DATE OF SURGERY: 10/12/2020    IMPLANT: TFNT30 16.5    ANESTHESIA:  MAC with topical Lidocaine    COMPLICATIONS:  None    ESTIMATED BLOOD LOSS: None    SPECIMENS: None    INDICATIONS:    The patient has a history of painless progressive visual loss and  difficulty with activities of daily living secondary to cataract formation.  After a thorough discussion of the risks, benefits, and alternatives to cataract surgery, including, but not limited to, the rare risks of infection, retinal detachment, hemorrhage, need for additional surgery, loss of vision, and even loss of the eye, the patient voices understanding and desires to proceed.    DESCRIPTION OF PROCEDURE:    After verification of consent and marking of the operative eye, the patient was positioned under the femtosecond LASER. Topical anesthetic drops were administered. A surgical timeout was initiated with verification of patient identifiers and the laser surgical plan. The eye was docked securely and the laser portion of the cataract procedure was carried out without complication.  The patient was returned to the pre-operative area to await the intraocular surgical portion of the cataract procedure.  The patients IOL calculations were reviewed, and the lens selection confirmed.   After verification and marking of the proper eye in the preop holding area, the patient was brought to the operating room in supine position where the eye was prepped and draped in standard sterile fashion with 5% Betadine and a lid speculum placed in the eye.   Topical 4% Lidocaine was used in addition to the preoperative anesthesia and the procedure was begun by the creation of a paracentesis incision through  which viscoelastic was used to fill the anterior chamber.  Next, a keratome blade was used to create a triplanar temporal clear corneal incision and a cystotome and Utrata forceps used to fashion a continuous curvilinear capsulorrhexis.  Hydrodissection was carried out using the Ellington hydrodissection cannula and the nucleus was found to be mobile.  Phacoemulsification of the nucleus was carried out using a quick chop technique, and all remaining epinuclear and cortical material was removed.  The eye was then reformed with Viscoelastic and the  intraocular lens was implanted into the capsular bag.  All remaining viscoelastics were removed from the eye and at the end of the case the pupil was round, the lens was well-centered within the capsular bag and all wounds were found to be water tight.  Drops of Vigamox and Pred Forte were instilled and a shield was placed over the eye. The patient will follow up with Dr. Morin in the morning.

## 2020-10-12 NOTE — ANESTHESIA POSTPROCEDURE EVALUATION
Anesthesia Post Evaluation    Patient: Herber Camarillo    Procedure(s) Performed: Procedure(s) (LRB):  EXTRACTION, CATARACT, WITH IOL INSERTION (Right)    Final Anesthesia Type: MAC    Patient location during evaluation: [facility]  Patient participation: Yes- Able to Participate  Level of consciousness: awake and alert  Post-procedure vital signs: reviewed and stable  Pain management: adequate  Airway patency: patent    PONV status at discharge: No PONV  Anesthetic complications: no      Cardiovascular status: stable  Respiratory status: room air, spontaneous ventilation and unassisted  Hydration status: euvolemic  Follow-up not needed.          Vitals Value Taken Time   /71 10/12/20 0759   Temp 36.5 °C (97.7 °F) 10/12/20 0759   Pulse 63 10/12/20 0759   Resp 18 10/12/20 0759   SpO2 98 % 10/12/20 0759         No case tracking events are documented in the log.      Pain/Robin Score: No data recorded

## 2020-10-12 NOTE — DISCHARGE SUMMARY
Outcome: Successful outpatient ophthalmic surgical procedure  Preprinted Instructions given to patient.  Regular diet.  Activity: No restrictions  Meds: see Med Rec  Condition: stable  Follow up: 1 day with Dr Morin  Disposition: Home  Diagnosis: s/p eye surgery

## 2020-10-12 NOTE — ANESTHESIA PREPROCEDURE EVALUATION
10/12/2020  Herber Camarillo is a 80 y.o., male.    Pre-op Assessment    I have reviewed the Patient Summary Reports.     I have reviewed the Nursing Notes. I have reviewed the NPO Status.   I have reviewed the Medications.     Review of Systems  Anesthesia Hx:  Denies Family Hx of Anesthesia complications.    Social:  Non-Smoker    Hematology/Oncology:  Hematology Normal       -- Cancer in past history (prostate):    EENT/Dental:EENT/Dental Normal   Cardiovascular:   Past MI (20 years ago) CAD asymptomatic CABG/stent (stent)     Pulmonary:  Pulmonary Normal    Renal/:  Renal/ Normal     Hepatic/GI:  Hepatic/GI Normal    Musculoskeletal:   Arthritis     Neurological:  Neurology Normal    Endocrine:  Endocrine Normal    Dermatological:  Skin Normal    Psych:  Psychiatric Normal           Physical Exam  General:  Well nourished    Airway/Jaw/Neck:  Airway Findings: Mouth Opening: Normal Tongue: Normal  General Airway Assessment: Adult  Jaw/Neck Findings:  Neck ROM: Extension Decreased, Mild, Extension Painful      Dental:  Dental Findings: lower partial dentures, Upper Dentures        Mental Status:  Mental Status Findings:  Cooperative, Alert and Oriented         Anesthesia Plan  Type of Anesthesia, risks & benefits discussed:  Anesthesia Type:  MAC  Patient's Preference:   Intra-op Monitoring Plan: standard ASA monitors  Intra-op Monitoring Plan Comments:   Post Op Pain Control Plan: per primary service following discharge from PACU  Post Op Pain Control Plan Comments:   Induction:    Beta Blocker:         Informed Consent: Patient understands risks and agrees with Anesthesia plan.  Questions answered. Anesthesia consent signed with patient.  ASA Score: 3     Day of Surgery Review of History & Physical:    H&P update referred to the surgeon.         Ready For Surgery From Anesthesia Perspective.

## 2020-10-12 NOTE — DISCHARGE INSTRUCTIONS
Anesthesia: Monitored Anesthesia Care (MAC)  Anesthesia safety  Tips for anesthesia safety include the following:   · Follow all instructions you are given for how long not to eat or drink before your procedure.  · Be sure your healthcare provider knows what medicines you take, especially any anti-inflammatory medicine or blood thinners. This includes aspirin and any other over-the-counter medicines, herbs, and supplements.  · Have an adult family member or friend drive you home after the procedure.  · For the first 24 hours after your surgery:  ¨ Do not drive or use heavy equipment.  ¨ Do not make important decisions or sign documents.  ¨ Avoid alcohol.  ¨ Have someone stay with you, if possible. They can watch for problems and help keep you safe.  Date Last Reviewed: 12/1/2016 © 2000-2017 [employer]. [address] [place] [address], [place] [address]. All rights reserved. This information is not intended as a substitute for professional medical care. Always follow your healthcare professional's instructions.

## 2020-10-13 ENCOUNTER — OFFICE VISIT (OUTPATIENT)
Dept: OPHTHALMOLOGY | Facility: CLINIC | Age: 80
End: 2020-10-13
Attending: OPHTHALMOLOGY
Payer: MEDICARE

## 2020-10-13 DIAGNOSIS — Z98.890 POST-OPERATIVE STATE: Primary | ICD-10-CM

## 2020-10-13 PROCEDURE — 99024 PR POST-OP FOLLOW-UP VISIT: ICD-10-PCS | Mod: HCNC,S$GLB,, | Performed by: OPHTHALMOLOGY

## 2020-10-13 PROCEDURE — 99999 PR PBB SHADOW E&M-EST. PATIENT-LVL III: ICD-10-PCS | Mod: PBBFAC,HCNC,, | Performed by: OPHTHALMOLOGY

## 2020-10-13 PROCEDURE — 99024 POSTOP FOLLOW-UP VISIT: CPT | Mod: HCNC,S$GLB,, | Performed by: OPHTHALMOLOGY

## 2020-10-13 PROCEDURE — 99999 PR PBB SHADOW E&M-EST. PATIENT-LVL III: CPT | Mod: PBBFAC,HCNC,, | Performed by: OPHTHALMOLOGY

## 2020-10-13 NOTE — PROGRESS NOTES
HPI     POD1 CE w/IOL OD. Patient states that he is doing well, notes some   photophobia.     PGB OU TID     Last edited by Marlen Johnston PCT on 10/13/2020  8:40 AM. (History)            Assessment /Plan     For exam results, see Encounter Report.    Post-operative state      Slit lamp exam:  L/L: nl  K: clear, wound sealed  AC: 1+ cell  Lens: IOL centered and stable    POD1 s/p Phaco/IOL  Appropriate precautions and post op medications reviewed.  Patient instructed to call or come in if symptoms of redness, decreased vision, or pain are experienced.  Excellent result [employer]ix

## 2020-10-25 ENCOUNTER — NURSE TRIAGE (OUTPATIENT)
Dept: ADMINISTRATIVE | Facility: CLINIC | Age: 80
End: 2020-10-25

## 2020-10-25 NOTE — TELEPHONE ENCOUNTER
"Patient on [facility]'s post procedure call back symptom tracker.  Spoke with wife Ana.   Patient's identity verified by stating first and last names and .  Patient's wife stated patient was doing "good" and denied any Covid 19 symptoms for patient  "

## 2020-11-03 ENCOUNTER — HOSPITAL ENCOUNTER (OUTPATIENT)
Dept: RADIOLOGY | Facility: HOSPITAL | Age: 80
Discharge: HOME | End: 2020-11-03
Attending: NURSE PRACTITIONER
Payer: MEDICARE

## 2020-11-03 ENCOUNTER — OFFICE VISIT (OUTPATIENT)
Dept: ORTHOPEDICS | Facility: CLINIC | Age: 80
End: 2020-11-03
Payer: MEDICARE

## 2020-11-03 DIAGNOSIS — M25.551 RIGHT HIP PAIN: Primary | ICD-10-CM

## 2020-11-03 DIAGNOSIS — M17.0 PRIMARY OSTEOARTHRITIS OF BOTH KNEES: ICD-10-CM

## 2020-11-03 DIAGNOSIS — M25.551 RIGHT HIP PAIN: ICD-10-CM

## 2020-11-03 PROCEDURE — 99999 PR PBB SHADOW E&M-EST. PATIENT-LVL III: CPT | Mod: PBBFAC,HCNC,, | Performed by: NURSE PRACTITIONER

## 2020-11-03 PROCEDURE — 73502 XR HIP 2 VIEW RIGHT: ICD-10-PCS | Mod: 26,HCNC,RT, | Performed by: RADIOLOGY

## 2020-11-03 PROCEDURE — 99213 PR OFFICE/OUTPT VISIT, EST, LEVL III, 20-29 MIN: ICD-10-PCS | Mod: HCNC,25,S$GLB, | Performed by: NURSE PRACTITIONER

## 2020-11-03 PROCEDURE — 73502 X-RAY EXAM HIP UNI 2-3 VIEWS: CPT | Mod: TC,HCNC,RT

## 2020-11-03 PROCEDURE — 20610 DRAIN/INJ JOINT/BURSA W/O US: CPT | Mod: 50,HCNC,S$GLB, | Performed by: NURSE PRACTITIONER

## 2020-11-03 PROCEDURE — 99999 PR PBB SHADOW E&M-EST. PATIENT-LVL III: ICD-10-PCS | Mod: PBBFAC,HCNC,, | Performed by: NURSE PRACTITIONER

## 2020-11-03 PROCEDURE — 20610 PR DRAIN/INJECT LARGE JOINT/BURSA: ICD-10-PCS | Mod: 50,HCNC,S$GLB, | Performed by: NURSE PRACTITIONER

## 2020-11-03 PROCEDURE — 1159F MED LIST DOCD IN RCRD: CPT | Mod: HCNC,S$GLB,, | Performed by: NURSE PRACTITIONER

## 2020-11-03 PROCEDURE — 1125F PR PAIN SEVERITY QUANTIFIED, PAIN PRESENT: ICD-10-PCS | Mod: HCNC,S$GLB,, | Performed by: NURSE PRACTITIONER

## 2020-11-03 PROCEDURE — 99213 OFFICE O/P EST LOW 20 MIN: CPT | Mod: HCNC,25,S$GLB, | Performed by: NURSE PRACTITIONER

## 2020-11-03 PROCEDURE — 73502 X-RAY EXAM HIP UNI 2-3 VIEWS: CPT | Mod: 26,HCNC,RT, | Performed by: RADIOLOGY

## 2020-11-03 PROCEDURE — 1101F PR PT FALLS ASSESS DOC 0-1 FALLS W/OUT INJ PAST YR: ICD-10-PCS | Mod: HCNC,CPTII,S$GLB, | Performed by: NURSE PRACTITIONER

## 2020-11-03 PROCEDURE — 1125F AMNT PAIN NOTED PAIN PRSNT: CPT | Mod: HCNC,S$GLB,, | Performed by: NURSE PRACTITIONER

## 2020-11-03 PROCEDURE — 1159F PR MEDICATION LIST DOCUMENTED IN MEDICAL RECORD: ICD-10-PCS | Mod: HCNC,S$GLB,, | Performed by: NURSE PRACTITIONER

## 2020-11-03 PROCEDURE — 1101F PT FALLS ASSESS-DOCD LE1/YR: CPT | Mod: HCNC,CPTII,S$GLB, | Performed by: NURSE PRACTITIONER

## 2020-11-03 RX ORDER — TRIAMCINOLONE ACETONIDE 40 MG/ML
80 INJECTION, SUSPENSION INTRA-ARTICULAR; INTRAMUSCULAR
Status: DISCONTINUED | OUTPATIENT
Start: 2020-11-03 | End: 2020-11-06

## 2020-11-03 RX ADMIN — TRIAMCINOLONE ACETONIDE 80 MG: 40 INJECTION, SUSPENSION INTRA-ARTICULAR; INTRAMUSCULAR at 09:11

## 2020-11-05 NOTE — PROGRESS NOTES
CC: Pain and Injections of the Right Knee, Pain and Injections of the Left Knee, and Pain of the Right Hip      HPI: Pt with c/o bilateral knee pain and right hip pain for the past few weeks. The knee pain is aching and medial and worse with increased activity. He has been seen in the past and has known bilateral knee arthritis. He has had cortisone injections in his knees with good relief in the past and he would like to repeat that today.   The pain in his right hip starts in his buttock and radiates down his leg to the foot. It is a sharp, shooting pain. It is worse after working in the yard. He had a right hip replacement in the past with no complications.  He is ambulating without assistive device. There is not a limp.    ROS  General: denies fever and chills  Resp: no c/o sob  CVS: no c/o cp  MSK: c/o bilateral knee pain and right hip pain    PE  General: AAOx3, pleasant and cooperative  Resp: respirations even and unlabored  MSK: bilateral knee exam  0 degrees extension  120 degrees flexion  No warmth or erythema   - effusion  + crepitus  + tenderness over the medial joint line    [facility]  - pain with internal and external rotation  - straight leg raise    Xray:  Reviewed by me with the patient and his wife: Previous right hip arthroplasty with metallic components in good position.  I see no acute fracture.  Moderate osteophyte formation and joint space narrowing left hip.  Surgical clips project over the pelvis    Assessment:  Bilateral knee djd  Sciatica, right    Plan:  Cortisone injections bilateral knees today  F/u for gel injections once approved by insurance  Medrol dose pack for sciatica  RICE  F/u as needed    Knee Injection Procedure Note  Diagnosis: bilateral knee degenerative arthritis  Indications: bilateral knee pain  Procedure Details: Verbal consent was obtained for the procedure. The injection site was identified and the skin was prepared with alcohol. The bilateral knee  was injected from an anterolateral approach with 1 ml of Kenalog and 4 ml Lidocaine each under sterile technique using a 22 gauge needle. The needle was removed and the area cleansed and dressed.  Complications:  Patient tolerated the procedure well.    he was advised to rest the knee today, using ice and elevation as needed for comfort and swelling.Immediate relief of the knee pain may be short lived and secondary to the lidocaine. he may have an increase in discomfort tonight followed by steady improvement over the next several days. It may take 1-2 weeks following the injection to get the full benefit of the medication.

## 2020-11-06 RX ORDER — TRIAMCINOLONE ACETONIDE 40 MG/ML
80 INJECTION, SUSPENSION INTRA-ARTICULAR; INTRAMUSCULAR
Status: COMPLETED | OUTPATIENT
Start: 2020-11-03 | End: 2020-11-03

## 2020-11-17 ENCOUNTER — OFFICE VISIT (OUTPATIENT)
Dept: ORTHOPEDICS | Facility: CLINIC | Age: 80
End: 2020-11-17
Payer: MEDICARE

## 2020-11-17 DIAGNOSIS — M17.0 PRIMARY OSTEOARTHRITIS OF BOTH KNEES: ICD-10-CM

## 2020-11-17 PROCEDURE — 3288F FALL RISK ASSESSMENT DOCD: CPT | Mod: HCNC,CPTII,S$GLB, | Performed by: NURSE PRACTITIONER

## 2020-11-17 PROCEDURE — 1101F PT FALLS ASSESS-DOCD LE1/YR: CPT | Mod: HCNC,CPTII,S$GLB, | Performed by: NURSE PRACTITIONER

## 2020-11-17 PROCEDURE — 1125F PR PAIN SEVERITY QUANTIFIED, PAIN PRESENT: ICD-10-PCS | Mod: HCNC,S$GLB,, | Performed by: NURSE PRACTITIONER

## 2020-11-17 PROCEDURE — 20610 DRAIN/INJ JOINT/BURSA W/O US: CPT | Mod: 50,HCNC,S$GLB, | Performed by: NURSE PRACTITIONER

## 2020-11-17 PROCEDURE — 99499 NO LOS: ICD-10-PCS | Mod: HCNC,S$GLB,, | Performed by: NURSE PRACTITIONER

## 2020-11-17 PROCEDURE — 3288F PR FALLS RISK ASSESSMENT DOCUMENTED: ICD-10-PCS | Mod: HCNC,CPTII,S$GLB, | Performed by: NURSE PRACTITIONER

## 2020-11-17 PROCEDURE — 99499 UNLISTED E&M SERVICE: CPT | Mod: HCNC,S$GLB,, | Performed by: NURSE PRACTITIONER

## 2020-11-17 PROCEDURE — 99999 PR PBB SHADOW E&M-EST. PATIENT-LVL III: CPT | Mod: PBBFAC,HCNC,, | Performed by: NURSE PRACTITIONER

## 2020-11-17 PROCEDURE — 99999 PR PBB SHADOW E&M-EST. PATIENT-LVL III: ICD-10-PCS | Mod: PBBFAC,HCNC,, | Performed by: NURSE PRACTITIONER

## 2020-11-17 PROCEDURE — 1125F AMNT PAIN NOTED PAIN PRSNT: CPT | Mod: HCNC,S$GLB,, | Performed by: NURSE PRACTITIONER

## 2020-11-17 PROCEDURE — 20610 PR DRAIN/INJECT LARGE JOINT/BURSA: ICD-10-PCS | Mod: 50,HCNC,S$GLB, | Performed by: NURSE PRACTITIONER

## 2020-11-17 PROCEDURE — 1101F PR PT FALLS ASSESS DOC 0-1 FALLS W/OUT INJ PAST YR: ICD-10-PCS | Mod: HCNC,CPTII,S$GLB, | Performed by: NURSE PRACTITIONER

## 2020-11-17 NOTE — PROGRESS NOTES
Herber Camarillo presents to clinic today for bilateral knee Monovisc injections.    Exam demonstrates the no effusion in the bilateral knees, and the skin is intact.    Diagnosis: primary osteoarthritis of both knees     After time out was performed and patient ID, side, and site were verified, the right knee and the left knee were sterilly prepped in the standard fashion.  A 22-gauge needle was introduced into the right knee and the left knee joint from an isaura-lateral site without complication. The right knee and the left knee were then injected with 6 ml of Monovisc each. Sterile dressing was applied.  The patient was instructed to resume activities as tolerated and to call with any problems.     Pt will f/u as needed

## 2020-11-27 ENCOUNTER — OFFICE VISIT (OUTPATIENT)
Dept: OPTOMETRY | Facility: CLINIC | Age: 80
End: 2020-11-27
Payer: MEDICARE

## 2020-11-27 DIAGNOSIS — H57.89 IRRITATION OF RIGHT EYE: ICD-10-CM

## 2020-11-27 DIAGNOSIS — Z98.42 S/P BILATERAL CATARACT EXTRACTION: Primary | ICD-10-CM

## 2020-11-27 DIAGNOSIS — Z98.41 S/P BILATERAL CATARACT EXTRACTION: Primary | ICD-10-CM

## 2020-11-27 PROCEDURE — 99999 PR PBB SHADOW E&M-EST. PATIENT-LVL II: CPT | Mod: PBBFAC,HCNC,, | Performed by: OPTOMETRIST

## 2020-11-27 PROCEDURE — 99024 PR POST-OP FOLLOW-UP VISIT: ICD-10-PCS | Mod: HCNC,S$GLB,, | Performed by: OPTOMETRIST

## 2020-11-27 PROCEDURE — 99024 POSTOP FOLLOW-UP VISIT: CPT | Mod: HCNC,S$GLB,, | Performed by: OPTOMETRIST

## 2020-11-27 PROCEDURE — 99999 PR PBB SHADOW E&M-EST. PATIENT-LVL II: ICD-10-PCS | Mod: PBBFAC,HCNC,, | Performed by: OPTOMETRIST

## 2020-11-27 NOTE — PROGRESS NOTES
HPI     09/28/2020 OS TFNT30 16.5  10/12/2020 OD TFNT30 16.5      1 mo PO pt sts OD seems like it is sore. Still seeing floaters in OU.   Having issues with opening OD lid.   Vision seems pretty good.     At's PRN       Last edited by Vernell Desir on 11/27/2020 11:32 AM. (History)            Assessment /Plan     For exam results, see Encounter Report.    S/P bilateral cataract extraction  -     OCT- Retina    Irritation of right eye          1.  Pt doing well.  No rx given.  No CME OU.  Retina flat and intact OU--no holes, tears, breaks, or RDs.    2.  Educated pt to use post-op drop tid x 1 more week than start using artificial tears tid.      RTC 1 year for routine exam.

## 2020-11-29 NOTE — LETTER
January 9, 2018      Fiedlina Loyola MD  [address]  [facility] [address]           [facility] - Orthopedics  [address], [address]  [facility] [address]  Phone: [phone]          Patient: Herber Camarillo   MR Number: [id number]   YOB: 1940   Date of Visit: 1/9/2018       Dear Dr. Fidelina Loyola:    Thank you for referring Herber Camarillo to me for evaluation. Attached you will find relevant portions of my assessment and plan of care.    If you have questions, please do not hesitate to call me. I look forward to following Herber Camarillo along with you.    Sincerely,    [employer], NP    Enclosure  CC:  No Recipients    If you would like to receive this communication electronically, please contact [email or web address] or [phone] to request more information on [employer] access.    For providers and/or their staff who would like to refer a patient to [facility], please contact us through our one-stop-shop provider referral line, Mackenzie Fagan, at [phone].    If you feel you have received this communication in error or would no longer like to receive these types of communications, please e-mail [email or web address]          States increasing pain since IUD removed, no order or drainage.

## 2020-12-09 ENCOUNTER — OFFICE VISIT (OUTPATIENT)
Dept: CARDIOLOGY | Facility: CLINIC | Age: 80
End: 2020-12-09
Payer: MEDICARE

## 2020-12-09 VITALS
HEIGHT: 68 IN | BODY MASS INDEX: 31.21 KG/M2 | WEIGHT: 205.94 LBS | HEART RATE: 67 BPM | SYSTOLIC BLOOD PRESSURE: 122 MMHG | DIASTOLIC BLOOD PRESSURE: 68 MMHG

## 2020-12-09 DIAGNOSIS — I51.89 DIASTOLIC DYSFUNCTION: ICD-10-CM

## 2020-12-09 DIAGNOSIS — I25.10 CORONARY ARTERY DISEASE INVOLVING NATIVE CORONARY ARTERY OF NATIVE HEART WITHOUT ANGINA PECTORIS: Primary | ICD-10-CM

## 2020-12-09 DIAGNOSIS — E78.5 HYPERLIPIDEMIA, UNSPECIFIED HYPERLIPIDEMIA TYPE: ICD-10-CM

## 2020-12-09 PROCEDURE — 99213 PR OFFICE/OUTPT VISIT, EST, LEVL III, 20-29 MIN: ICD-10-PCS | Mod: HCNC,S$GLB,, | Performed by: INTERNAL MEDICINE

## 2020-12-09 PROCEDURE — 1159F PR MEDICATION LIST DOCUMENTED IN MEDICAL RECORD: ICD-10-PCS | Mod: HCNC,S$GLB,, | Performed by: INTERNAL MEDICINE

## 2020-12-09 PROCEDURE — 99213 OFFICE O/P EST LOW 20 MIN: CPT | Mod: HCNC,S$GLB,, | Performed by: INTERNAL MEDICINE

## 2020-12-09 PROCEDURE — 1126F AMNT PAIN NOTED NONE PRSNT: CPT | Mod: HCNC,S$GLB,, | Performed by: INTERNAL MEDICINE

## 2020-12-09 PROCEDURE — 1159F MED LIST DOCD IN RCRD: CPT | Mod: HCNC,S$GLB,, | Performed by: INTERNAL MEDICINE

## 2020-12-09 PROCEDURE — 99999 PR PBB SHADOW E&M-EST. PATIENT-LVL IV: CPT | Mod: PBBFAC,HCNC,, | Performed by: INTERNAL MEDICINE

## 2020-12-09 PROCEDURE — 1126F PR PAIN SEVERITY QUANTIFIED, NO PAIN PRESENT: ICD-10-PCS | Mod: HCNC,S$GLB,, | Performed by: INTERNAL MEDICINE

## 2020-12-09 PROCEDURE — 99999 PR PBB SHADOW E&M-EST. PATIENT-LVL IV: ICD-10-PCS | Mod: PBBFAC,HCNC,, | Performed by: INTERNAL MEDICINE

## 2020-12-09 NOTE — PROGRESS NOTES
Subjective:    Patient ID:  Herber Camarillo is a 80 y.o. male who presents for follow-up of Coronary artery disease involving native coronary artery of  (1 yr fu)      HPI   The patient is a 80 year male followed  post NSTEMI [?PCI to LAD] 2000. He continues to do well and denies chest pain or WILKINS.       Lab Results   Component Value Date     07/20/2020    K 4.1 07/20/2020     07/20/2020    CO2 27 07/20/2020    BUN 12 07/20/2020    CREATININE 0.86 07/20/2020    GLU 99 07/20/2020    HGBA1C 4.9 12/04/2017    AST 28 07/20/2020    ALT 31 07/20/2020    ALBUMIN 3.5 07/20/2020    PROT 6.1 07/20/2020    BILITOT 0.6 07/20/2020    WBC 8.73 07/20/2020    HGB 16.6 07/20/2020    HCT 50.9 07/20/2020    MCV 98 07/20/2020     07/20/2020    INR 1.0 12/16/2016    PSA 0.04 01/13/2020    TSH 1.987 12/16/2016         Lab Results   Component Value Date    CHOL 122 07/20/2020    HDL 41 07/20/2020    TRIG 64 07/20/2020       Lab Results   Component Value Date    LDLCALC 68.2 07/20/2020       Past Medical History:   Diagnosis Date    Arthritis     CAD (coronary artery disease) 10/30/2012    Cataract     Dermatochalasis of both upper eyelids     Fever blister     Glucose intolerance (impaired glucose tolerance) 7/24/2013    Heart attack     Hyperlipidemia 10/30/2012    Joint pain     Keloid cicatrix     Lung nodule, solitary 4/30/2015    Non morbid obesity due to excess calories 10/20/2015    Obesity     Overweight(278.02) 9/5/2013    Prostate cancer     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 DO NOT eat or drink for 30 minute after rinsing., Disp: 473 mL, Rfl: 3    metoprolol succinate (TOPROL-XL) 25 MG 24 hr tablet, TAKE 1 TABLET EVERY DAY, Disp: 90 tablet, Rfl: 3    nitroGLYCERIN (NITROSTAT) 0.4 MG SL tablet, Place 1 tablet (0.4 mg total) under the tongue every 5 (five) minutes as needed for Chest pain. ONE UNDER TONGUE FOR ANGINA MAY REPEAT IN 5 MIN IF IT STILL CONTINUES, CALL 911 AND TAKE AN ASPIRIN, Disp: 25 tablet, Rfl: 12    omega-3 fatty acids-vitamin E (FISH OIL) 1,000 mg Cap, Take by mouth., Disp: , Rfl:     prednisolon/gatiflox/bromfenac (PREDNISOL ACE-GATIFLOX-BROMFEN) 1-0.5-0.075 % DrpS, Apply 1 drop to eye 3 (three) times daily. (Patient not taking: Reported on 12/9/2020), Disp: 5 mL, Rfl: 3    Current Facility-Administered Medications:     sodium chloride 0.9% flush 10 mL, 10 mL, Intravenous, PRN, Nam Morin MD    sodium chloride 0.9% flush 10 mL, 10 mL, Intravenous, PRN, Nam Morin MD          Review of Systems   Constitution: Negative for decreased appetite, diaphoresis, fever, malaise/fatigue, weight gain and weight loss.   HENT: Negative for congestion, ear discharge, ear pain and nosebleeds.    Eyes: Negative for blurred vision, double vision and visual disturbance.   Cardiovascular: Negative for chest pain, claudication, cyanosis, dyspnea on exertion, irregular heartbeat, leg swelling, near-syncope, orthopnea, palpitations, paroxysmal nocturnal dyspnea and syncope.   Respiratory: Negative for cough, hemoptysis, shortness of breath, sleep disturbances due to breathing, snoring, sputum production and wheezing.    Endocrine: Negative for polydipsia, polyphagia and polyuria.   Hematologic/Lymphatic: Negative for adenopathy and bleeding problem. Does not bruise/bleed easily.   Skin: Negative for color change, nail changes, poor wound healing and rash.  "  Musculoskeletal: Negative for muscle cramps and muscle weakness.   Gastrointestinal: Negative for abdominal pain, anorexia, change in bowel habit, hematochezia, nausea and vomiting.   Genitourinary: Negative for dysuria, frequency and hematuria.   Neurological: Negative for brief paralysis, difficulty with concentration, excessive daytime sleepiness, dizziness, focal weakness, headaches, light-headedness, seizures, vertigo and weakness.   Psychiatric/Behavioral: Negative for altered mental status and depression.   Allergic/Immunologic: Negative for persistent infections.        Objective:/68 (BP Location: Left arm, Patient Position: Sitting, BP Method: Large (Automatic))   Pulse 67   Ht 5' 7.5" (1.715 m)   Wt 93.4 kg (205 lb 14.6 oz)   BMI 31.77 kg/m²             Physical Exam   Constitutional: He is oriented to person, place, and time. He appears well-developed and well-nourished.   HENT:   Head: Normocephalic.   Right Ear: External ear normal.   Left Ear: External ear normal.   Nose: Nose normal.   Inspection of lips, teeth and gums normal   Eyes: Pupils are equal, round, and reactive to light. EOM are normal. No scleral icterus.   Neck: Normal range of motion. Neck supple. No JVD present. No tracheal deviation present. No thyromegaly present.   Cardiovascular: Normal rate, regular rhythm, S1 normal, S2 normal and intact distal pulses. Exam reveals no gallop and no friction rub.   No murmur heard.  Pulmonary/Chest: Effort normal and breath sounds normal.   Abdominal: Bowel sounds are normal. He exhibits no distension. There is no hepatosplenomegaly. There is no abdominal tenderness. There is no guarding.   Musculoskeletal: Normal range of motion.         General: No tenderness or edema.   Lymphadenopathy:   Palpation of neck and groin lymph nodes normal   Neurological: He is alert and oriented to person, place, and time. No cranial nerve deficit. He exhibits normal muscle tone. Coordination normal. "   Skin: Skin is dry.   No ankle nor pretibial edema   Psychiatric: His behavior is normal. Judgment and thought content normal.         Assessment:       1. Coronary artery disease involving native coronary artery of native heart without angina pectoris    2. Diastolic dysfunction    3. Hyperlipidemia, unspecified hyperlipidemia type         Plan:       Herber was seen today for coronary artery disease involving native coronary artery of .    Diagnoses and all orders for this visit:    Coronary artery disease involving native coronary artery of native heart without angina pectoris    Diastolic dysfunction  -     Comprehensive Metabolic Panel; Future; Expected date: 12/09/2021  -     CBC Auto Differential; Future; Expected date: 12/09/2021    Hyperlipidemia, unspecified hyperlipidemia type  -     Lipid Panel; Future; Expected date: 12/09/2021

## 2021-01-04 ENCOUNTER — PATIENT MESSAGE (OUTPATIENT)
Dept: ADMINISTRATIVE | Facility: HOSPITAL | Age: 81
End: 2021-01-04

## 2021-01-15 ENCOUNTER — IMMUNIZATION (OUTPATIENT)
Dept: PHARMACY | Facility: CLINIC | Age: 81
End: 2021-01-15
Payer: MEDICARE

## 2021-01-15 DIAGNOSIS — Z23 NEED FOR VACCINATION: Primary | ICD-10-CM

## 2021-02-12 ENCOUNTER — IMMUNIZATION (OUTPATIENT)
Dept: PHARMACY | Facility: CLINIC | Age: 81
End: 2021-02-12
Payer: MEDICARE

## 2021-02-12 DIAGNOSIS — Z23 NEED FOR VACCINATION: Primary | ICD-10-CM

## 2021-03-08 ENCOUNTER — PATIENT MESSAGE (OUTPATIENT)
Dept: ORTHOPEDICS | Facility: CLINIC | Age: 81
End: 2021-03-08

## 2021-03-16 ENCOUNTER — OFFICE VISIT (OUTPATIENT)
Dept: ORTHOPEDICS | Facility: CLINIC | Age: 81
End: 2021-03-16
Payer: MEDICARE

## 2021-03-16 DIAGNOSIS — M17.0 PRIMARY OSTEOARTHRITIS OF BOTH KNEES: Primary | ICD-10-CM

## 2021-03-16 PROCEDURE — 99499 NO LOS: ICD-10-PCS | Mod: S$GLB,,, | Performed by: NURSE PRACTITIONER

## 2021-03-16 PROCEDURE — 99999 PR PBB SHADOW E&M-EST. PATIENT-LVL III: CPT | Mod: PBBFAC,,, | Performed by: NURSE PRACTITIONER

## 2021-03-16 PROCEDURE — 3288F FALL RISK ASSESSMENT DOCD: CPT | Mod: CPTII,S$GLB,, | Performed by: NURSE PRACTITIONER

## 2021-03-16 PROCEDURE — 99499 UNLISTED E&M SERVICE: CPT | Mod: S$GLB,,, | Performed by: NURSE PRACTITIONER

## 2021-03-16 PROCEDURE — 20610 PR DRAIN/INJECT LARGE JOINT/BURSA: ICD-10-PCS | Mod: 50,S$GLB,, | Performed by: NURSE PRACTITIONER

## 2021-03-16 PROCEDURE — 20610 DRAIN/INJ JOINT/BURSA W/O US: CPT | Mod: 50,S$GLB,, | Performed by: NURSE PRACTITIONER

## 2021-03-16 PROCEDURE — 1101F PT FALLS ASSESS-DOCD LE1/YR: CPT | Mod: CPTII,S$GLB,, | Performed by: NURSE PRACTITIONER

## 2021-03-16 PROCEDURE — 1125F PR PAIN SEVERITY QUANTIFIED, PAIN PRESENT: ICD-10-PCS | Mod: S$GLB,,, | Performed by: NURSE PRACTITIONER

## 2021-03-16 PROCEDURE — 1125F AMNT PAIN NOTED PAIN PRSNT: CPT | Mod: S$GLB,,, | Performed by: NURSE PRACTITIONER

## 2021-03-16 PROCEDURE — 3288F PR FALLS RISK ASSESSMENT DOCUMENTED: ICD-10-PCS | Mod: CPTII,S$GLB,, | Performed by: NURSE PRACTITIONER

## 2021-03-16 PROCEDURE — 1101F PR PT FALLS ASSESS DOC 0-1 FALLS W/OUT INJ PAST YR: ICD-10-PCS | Mod: CPTII,S$GLB,, | Performed by: NURSE PRACTITIONER

## 2021-03-16 PROCEDURE — 99999 PR PBB SHADOW E&M-EST. PATIENT-LVL III: ICD-10-PCS | Mod: PBBFAC,,, | Performed by: NURSE PRACTITIONER

## 2021-03-16 RX ADMIN — TRIAMCINOLONE ACETONIDE 80 MG: 40 INJECTION, SUSPENSION INTRA-ARTICULAR; INTRAMUSCULAR at 01:03

## 2021-03-19 RX ORDER — TRIAMCINOLONE ACETONIDE 40 MG/ML
80 INJECTION, SUSPENSION INTRA-ARTICULAR; INTRAMUSCULAR
Status: COMPLETED | OUTPATIENT
Start: 2021-03-16 | End: 2021-03-16

## 2021-03-22 ENCOUNTER — OFFICE VISIT (OUTPATIENT)
Dept: INTERNAL MEDICINE | Facility: CLINIC | Age: 81
End: 2021-03-22
Payer: MEDICARE

## 2021-03-22 VITALS
SYSTOLIC BLOOD PRESSURE: 125 MMHG | WEIGHT: 207 LBS | HEIGHT: 68 IN | BODY MASS INDEX: 31.37 KG/M2 | DIASTOLIC BLOOD PRESSURE: 70 MMHG

## 2021-03-22 DIAGNOSIS — C61 PROSTATE CANCER: ICD-10-CM

## 2021-03-22 DIAGNOSIS — E79.89 URIC ACID CRYSTALLOPATHY: ICD-10-CM

## 2021-03-22 DIAGNOSIS — R73.01 IFG (IMPAIRED FASTING GLUCOSE): ICD-10-CM

## 2021-03-22 DIAGNOSIS — I25.10 CORONARY ARTERY DISEASE INVOLVING NATIVE CORONARY ARTERY OF NATIVE HEART WITHOUT ANGINA PECTORIS: ICD-10-CM

## 2021-03-22 DIAGNOSIS — E78.5 HYPERLIPIDEMIA, UNSPECIFIED HYPERLIPIDEMIA TYPE: ICD-10-CM

## 2021-03-22 DIAGNOSIS — L57.8 ACTINIC DERMATITIS: ICD-10-CM

## 2021-03-22 DIAGNOSIS — Z12.5 ENCOUNTER FOR SCREENING FOR MALIGNANT NEOPLASM OF PROSTATE: ICD-10-CM

## 2021-03-22 DIAGNOSIS — E66.09 NON MORBID OBESITY DUE TO EXCESS CALORIES: ICD-10-CM

## 2021-03-22 DIAGNOSIS — E55.9 VITAMIN D DEFICIENCY DISEASE: ICD-10-CM

## 2021-03-22 DIAGNOSIS — M16.11 PRIMARY OSTEOARTHRITIS OF RIGHT HIP: ICD-10-CM

## 2021-03-22 DIAGNOSIS — I70.0 ATHEROSCLEROSIS OF AORTA: ICD-10-CM

## 2021-03-22 DIAGNOSIS — Z12.11 COLON CANCER SCREENING: ICD-10-CM

## 2021-03-22 DIAGNOSIS — R91.1 LUNG NODULE, SOLITARY: ICD-10-CM

## 2021-03-22 DIAGNOSIS — Z00.00 ANNUAL PHYSICAL EXAM: Primary | ICD-10-CM

## 2021-03-22 DIAGNOSIS — I51.89 DIASTOLIC DYSFUNCTION: ICD-10-CM

## 2021-03-22 PROCEDURE — 3288F FALL RISK ASSESSMENT DOCD: CPT | Mod: CPTII,S$GLB,, | Performed by: INTERNAL MEDICINE

## 2021-03-22 PROCEDURE — 3288F PR FALLS RISK ASSESSMENT DOCUMENTED: ICD-10-PCS | Mod: CPTII,S$GLB,, | Performed by: INTERNAL MEDICINE

## 2021-03-22 PROCEDURE — 99999 PR PBB SHADOW E&M-EST. PATIENT-LVL IV: CPT | Mod: PBBFAC,,, | Performed by: INTERNAL MEDICINE

## 2021-03-22 PROCEDURE — 1126F PR PAIN SEVERITY QUANTIFIED, NO PAIN PRESENT: ICD-10-PCS | Mod: S$GLB,,, | Performed by: INTERNAL MEDICINE

## 2021-03-22 PROCEDURE — 1101F PT FALLS ASSESS-DOCD LE1/YR: CPT | Mod: CPTII,S$GLB,, | Performed by: INTERNAL MEDICINE

## 2021-03-22 PROCEDURE — 99397 PR PREVENTIVE VISIT,EST,65 & OVER: ICD-10-PCS | Mod: S$GLB,,, | Performed by: INTERNAL MEDICINE

## 2021-03-22 PROCEDURE — 99999 PR PBB SHADOW E&M-EST. PATIENT-LVL IV: ICD-10-PCS | Mod: PBBFAC,,, | Performed by: INTERNAL MEDICINE

## 2021-03-22 PROCEDURE — 1126F AMNT PAIN NOTED NONE PRSNT: CPT | Mod: S$GLB,,, | Performed by: INTERNAL MEDICINE

## 2021-03-22 PROCEDURE — 99499 UNLISTED E&M SERVICE: CPT | Mod: S$GLB,,, | Performed by: INTERNAL MEDICINE

## 2021-03-22 PROCEDURE — 1101F PR PT FALLS ASSESS DOC 0-1 FALLS W/OUT INJ PAST YR: ICD-10-PCS | Mod: CPTII,S$GLB,, | Performed by: INTERNAL MEDICINE

## 2021-03-22 PROCEDURE — 99397 PER PM REEVAL EST PAT 65+ YR: CPT | Mod: S$GLB,,, | Performed by: INTERNAL MEDICINE

## 2021-03-22 PROCEDURE — 99499 RISK ADDL DX/OHS AUDIT: ICD-10-PCS | Mod: S$GLB,,, | Performed by: INTERNAL MEDICINE

## 2021-04-15 ENCOUNTER — LAB VISIT (OUTPATIENT)
Dept: LAB | Facility: HOSPITAL | Age: 81
End: 2021-04-15
Attending: INTERNAL MEDICINE
Payer: MEDICARE

## 2021-04-15 DIAGNOSIS — Z12.11 COLON CANCER SCREENING: ICD-10-CM

## 2021-04-15 PROCEDURE — 82274 ASSAY TEST FOR BLOOD FECAL: CPT | Performed by: INTERNAL MEDICINE

## 2021-04-28 ENCOUNTER — PATIENT OUTREACH (OUTPATIENT)
Dept: ADMINISTRATIVE | Facility: OTHER | Age: 81
End: 2021-04-28

## 2021-04-28 ENCOUNTER — PATIENT MESSAGE (OUTPATIENT)
Dept: ADMINISTRATIVE | Facility: OTHER | Age: 81
End: 2021-04-28

## 2021-04-28 ENCOUNTER — OFFICE VISIT (OUTPATIENT)
Dept: DERMATOLOGY | Facility: CLINIC | Age: 81
End: 2021-04-28
Payer: MEDICARE

## 2021-04-28 DIAGNOSIS — Z12.83 SKIN CANCER SCREENING: Primary | ICD-10-CM

## 2021-04-28 DIAGNOSIS — D18.01 CHERRY ANGIOMA: ICD-10-CM

## 2021-04-28 DIAGNOSIS — L72.0 EIC (EPIDERMAL INCLUSION CYST): ICD-10-CM

## 2021-04-28 DIAGNOSIS — D22.9 MULTIPLE BENIGN NEVI: ICD-10-CM

## 2021-04-28 DIAGNOSIS — L82.1 SK (SEBORRHEIC KERATOSIS): ICD-10-CM

## 2021-04-28 PROCEDURE — 1159F PR MEDICATION LIST DOCUMENTED IN MEDICAL RECORD: ICD-10-PCS | Mod: S$GLB,,, | Performed by: DERMATOLOGY

## 2021-04-28 PROCEDURE — 1126F PR PAIN SEVERITY QUANTIFIED, NO PAIN PRESENT: ICD-10-PCS | Mod: S$GLB,,, | Performed by: DERMATOLOGY

## 2021-04-28 PROCEDURE — 99213 PR OFFICE/OUTPT VISIT, EST, LEVL III, 20-29 MIN: ICD-10-PCS | Mod: S$GLB,,, | Performed by: DERMATOLOGY

## 2021-04-28 PROCEDURE — 3288F FALL RISK ASSESSMENT DOCD: CPT | Mod: CPTII,S$GLB,, | Performed by: DERMATOLOGY

## 2021-04-28 PROCEDURE — 99999 PR PBB SHADOW E&M-EST. PATIENT-LVL III: ICD-10-PCS | Mod: PBBFAC,,, | Performed by: DERMATOLOGY

## 2021-04-28 PROCEDURE — 1159F MED LIST DOCD IN RCRD: CPT | Mod: S$GLB,,, | Performed by: DERMATOLOGY

## 2021-04-28 PROCEDURE — 99213 OFFICE O/P EST LOW 20 MIN: CPT | Mod: S$GLB,,, | Performed by: DERMATOLOGY

## 2021-04-28 PROCEDURE — 1101F PR PT FALLS ASSESS DOC 0-1 FALLS W/OUT INJ PAST YR: ICD-10-PCS | Mod: CPTII,S$GLB,, | Performed by: DERMATOLOGY

## 2021-04-28 PROCEDURE — 1126F AMNT PAIN NOTED NONE PRSNT: CPT | Mod: S$GLB,,, | Performed by: DERMATOLOGY

## 2021-04-28 PROCEDURE — 3288F PR FALLS RISK ASSESSMENT DOCUMENTED: ICD-10-PCS | Mod: CPTII,S$GLB,, | Performed by: DERMATOLOGY

## 2021-04-28 PROCEDURE — 1101F PT FALLS ASSESS-DOCD LE1/YR: CPT | Mod: CPTII,S$GLB,, | Performed by: DERMATOLOGY

## 2021-04-28 PROCEDURE — 99999 PR PBB SHADOW E&M-EST. PATIENT-LVL III: CPT | Mod: PBBFAC,,, | Performed by: DERMATOLOGY

## 2021-05-05 LAB — HEMOCCULT STL QL IA: NEGATIVE

## 2021-05-24 RX ORDER — ALLOPURINOL 300 MG/1
300 TABLET ORAL DAILY
Qty: 90 TABLET | Refills: 3 | Status: SHIPPED | OUTPATIENT
Start: 2021-05-24 | End: 2022-03-23

## 2021-05-25 RX ORDER — METOPROLOL SUCCINATE 25 MG/1
25 TABLET, EXTENDED RELEASE ORAL DAILY
Qty: 90 TABLET | Refills: 3 | Status: SHIPPED | OUTPATIENT
Start: 2021-05-25 | End: 2022-05-15 | Stop reason: SDUPTHER

## 2021-06-10 ENCOUNTER — PATIENT MESSAGE (OUTPATIENT)
Dept: CARDIOLOGY | Facility: CLINIC | Age: 81
End: 2021-06-10

## 2021-07-09 ENCOUNTER — HOSPITAL ENCOUNTER (OUTPATIENT)
Dept: RADIOLOGY | Facility: HOSPITAL | Age: 81
Discharge: HOME | End: 2021-07-09
Attending: NURSE PRACTITIONER
Payer: MEDICARE

## 2021-07-09 ENCOUNTER — OFFICE VISIT (OUTPATIENT)
Dept: ORTHOPEDICS | Facility: CLINIC | Age: 81
End: 2021-07-09
Payer: MEDICARE

## 2021-07-09 VITALS — BODY MASS INDEX: 31.37 KG/M2 | WEIGHT: 207 LBS | HEIGHT: 68 IN

## 2021-07-09 DIAGNOSIS — M25.562 ACUTE PAIN OF BOTH KNEES: Primary | ICD-10-CM

## 2021-07-09 DIAGNOSIS — M25.561 ACUTE PAIN OF BOTH KNEES: ICD-10-CM

## 2021-07-09 DIAGNOSIS — M25.562 ACUTE PAIN OF BOTH KNEES: ICD-10-CM

## 2021-07-09 DIAGNOSIS — M17.0 PRIMARY OSTEOARTHRITIS OF BOTH KNEES: ICD-10-CM

## 2021-07-09 DIAGNOSIS — M25.561 ACUTE PAIN OF BOTH KNEES: Primary | ICD-10-CM

## 2021-07-09 PROCEDURE — 20610 DRAIN/INJ JOINT/BURSA W/O US: CPT | Mod: 50,S$GLB,, | Performed by: NURSE PRACTITIONER

## 2021-07-09 PROCEDURE — 1125F PR PAIN SEVERITY QUANTIFIED, PAIN PRESENT: ICD-10-PCS | Mod: S$GLB,,, | Performed by: NURSE PRACTITIONER

## 2021-07-09 PROCEDURE — 73562 X-RAY EXAM OF KNEE 3: CPT | Mod: 26,LT,, | Performed by: RADIOLOGY

## 2021-07-09 PROCEDURE — 99499 RISK ADDL DX/OHS AUDIT: ICD-10-PCS | Mod: HCNC,S$GLB,, | Performed by: NURSE PRACTITIONER

## 2021-07-09 PROCEDURE — 20610 PR DRAIN/INJECT LARGE JOINT/BURSA: ICD-10-PCS | Mod: 50,S$GLB,, | Performed by: NURSE PRACTITIONER

## 2021-07-09 PROCEDURE — 99999 PR PBB SHADOW E&M-EST. PATIENT-LVL III: CPT | Mod: PBBFAC,,, | Performed by: NURSE PRACTITIONER

## 2021-07-09 PROCEDURE — 73562 X-RAY EXAM OF KNEE 3: CPT | Mod: TC,50

## 2021-07-09 PROCEDURE — 1125F AMNT PAIN NOTED PAIN PRSNT: CPT | Mod: S$GLB,,, | Performed by: NURSE PRACTITIONER

## 2021-07-09 PROCEDURE — 99213 PR OFFICE/OUTPT VISIT, EST, LEVL III, 20-29 MIN: ICD-10-PCS | Mod: 25,S$GLB,, | Performed by: NURSE PRACTITIONER

## 2021-07-09 PROCEDURE — 99213 OFFICE O/P EST LOW 20 MIN: CPT | Mod: 25,S$GLB,, | Performed by: NURSE PRACTITIONER

## 2021-07-09 PROCEDURE — 3288F FALL RISK ASSESSMENT DOCD: CPT | Mod: CPTII,S$GLB,, | Performed by: NURSE PRACTITIONER

## 2021-07-09 PROCEDURE — 3288F PR FALLS RISK ASSESSMENT DOCUMENTED: ICD-10-PCS | Mod: CPTII,S$GLB,, | Performed by: NURSE PRACTITIONER

## 2021-07-09 PROCEDURE — 99499 UNLISTED E&M SERVICE: CPT | Mod: HCNC,S$GLB,, | Performed by: NURSE PRACTITIONER

## 2021-07-09 PROCEDURE — 1159F MED LIST DOCD IN RCRD: CPT | Mod: S$GLB,,, | Performed by: NURSE PRACTITIONER

## 2021-07-09 PROCEDURE — 99999 PR PBB SHADOW E&M-EST. PATIENT-LVL III: ICD-10-PCS | Mod: PBBFAC,,, | Performed by: NURSE PRACTITIONER

## 2021-07-09 PROCEDURE — 73562 XR KNEE ORTHO BILAT: ICD-10-PCS | Mod: 26,RT,, | Performed by: RADIOLOGY

## 2021-07-09 PROCEDURE — 1101F PT FALLS ASSESS-DOCD LE1/YR: CPT | Mod: CPTII,S$GLB,, | Performed by: NURSE PRACTITIONER

## 2021-07-09 PROCEDURE — 1101F PR PT FALLS ASSESS DOC 0-1 FALLS W/OUT INJ PAST YR: ICD-10-PCS | Mod: CPTII,S$GLB,, | Performed by: NURSE PRACTITIONER

## 2021-07-09 PROCEDURE — 1159F PR MEDICATION LIST DOCUMENTED IN MEDICAL RECORD: ICD-10-PCS | Mod: S$GLB,,, | Performed by: NURSE PRACTITIONER

## 2021-07-09 PROCEDURE — 73562 X-RAY EXAM OF KNEE 3: CPT | Mod: 26,RT,, | Performed by: RADIOLOGY

## 2021-07-09 RX ADMIN — TRIAMCINOLONE ACETONIDE 80 MG: 40 INJECTION, SUSPENSION INTRA-ARTICULAR; INTRAMUSCULAR at 08:07

## 2021-07-13 RX ORDER — TRIAMCINOLONE ACETONIDE 40 MG/ML
80 INJECTION, SUSPENSION INTRA-ARTICULAR; INTRAMUSCULAR
Status: COMPLETED | OUTPATIENT
Start: 2021-07-09 | End: 2021-07-09

## 2021-08-12 ENCOUNTER — TELEPHONE (OUTPATIENT)
Dept: INTERNAL MEDICINE | Facility: CLINIC | Age: 81
End: 2021-08-12

## 2021-10-06 ENCOUNTER — PATIENT MESSAGE (OUTPATIENT)
Dept: INTERNAL MEDICINE | Facility: CLINIC | Age: 81
End: 2021-10-06

## 2021-10-08 ENCOUNTER — TELEPHONE (OUTPATIENT)
Dept: INTERNAL MEDICINE | Facility: CLINIC | Age: 81
End: 2021-10-08

## 2021-12-14 ENCOUNTER — PATIENT MESSAGE (OUTPATIENT)
Dept: ORTHOPEDICS | Facility: CLINIC | Age: 81
End: 2021-12-14
Payer: MEDICARE

## 2021-12-14 DIAGNOSIS — M54.30 SCIATICA, UNSPECIFIED LATERALITY: Primary | ICD-10-CM

## 2021-12-14 RX ORDER — METHYLPREDNISOLONE 4 MG/1
TABLET ORAL
Qty: 21 TABLET | Refills: 0 | Status: SHIPPED | OUTPATIENT
Start: 2021-12-14 | End: 2022-01-04

## 2022-01-05 ENCOUNTER — OFFICE VISIT (OUTPATIENT)
Dept: ORTHOPEDICS | Facility: CLINIC | Age: 82
End: 2022-01-05
Payer: MEDICARE

## 2022-01-05 VITALS — WEIGHT: 207.19 LBS | HEIGHT: 68 IN | BODY MASS INDEX: 31.4 KG/M2

## 2022-01-05 DIAGNOSIS — M17.11 PRIMARY OSTEOARTHRITIS OF RIGHT KNEE: ICD-10-CM

## 2022-01-05 DIAGNOSIS — M17.12 PRIMARY OSTEOARTHRITIS OF LEFT KNEE: Primary | ICD-10-CM

## 2022-01-05 PROCEDURE — 3288F PR FALLS RISK ASSESSMENT DOCUMENTED: ICD-10-PCS | Mod: HCNC,CPTII,S$GLB, | Performed by: ORTHOPAEDIC SURGERY

## 2022-01-05 PROCEDURE — 99213 OFFICE O/P EST LOW 20 MIN: CPT | Mod: HCNC,S$GLB,, | Performed by: ORTHOPAEDIC SURGERY

## 2022-01-05 PROCEDURE — 3288F FALL RISK ASSESSMENT DOCD: CPT | Mod: HCNC,CPTII,S$GLB, | Performed by: ORTHOPAEDIC SURGERY

## 2022-01-05 PROCEDURE — 1160F PR REVIEW ALL MEDS BY PRESCRIBER/CLIN PHARMACIST DOCUMENTED: ICD-10-PCS | Mod: HCNC,CPTII,S$GLB, | Performed by: ORTHOPAEDIC SURGERY

## 2022-01-05 PROCEDURE — 1159F PR MEDICATION LIST DOCUMENTED IN MEDICAL RECORD: ICD-10-PCS | Mod: HCNC,CPTII,S$GLB, | Performed by: ORTHOPAEDIC SURGERY

## 2022-01-05 PROCEDURE — 1101F PT FALLS ASSESS-DOCD LE1/YR: CPT | Mod: HCNC,CPTII,S$GLB, | Performed by: ORTHOPAEDIC SURGERY

## 2022-01-05 PROCEDURE — 1159F MED LIST DOCD IN RCRD: CPT | Mod: HCNC,CPTII,S$GLB, | Performed by: ORTHOPAEDIC SURGERY

## 2022-01-05 PROCEDURE — 1125F AMNT PAIN NOTED PAIN PRSNT: CPT | Mod: HCNC,CPTII,S$GLB, | Performed by: ORTHOPAEDIC SURGERY

## 2022-01-05 PROCEDURE — 99213 PR OFFICE/OUTPT VISIT, EST, LEVL III, 20-29 MIN: ICD-10-PCS | Mod: HCNC,S$GLB,, | Performed by: ORTHOPAEDIC SURGERY

## 2022-01-05 PROCEDURE — 99999 PR PBB SHADOW E&M-EST. PATIENT-LVL III: ICD-10-PCS | Mod: PBBFAC,HCNC,, | Performed by: ORTHOPAEDIC SURGERY

## 2022-01-05 PROCEDURE — 1125F PR PAIN SEVERITY QUANTIFIED, PAIN PRESENT: ICD-10-PCS | Mod: HCNC,CPTII,S$GLB, | Performed by: ORTHOPAEDIC SURGERY

## 2022-01-05 PROCEDURE — 99999 PR PBB SHADOW E&M-EST. PATIENT-LVL III: CPT | Mod: PBBFAC,HCNC,, | Performed by: ORTHOPAEDIC SURGERY

## 2022-01-05 PROCEDURE — 1160F RVW MEDS BY RX/DR IN RCRD: CPT | Mod: HCNC,CPTII,S$GLB, | Performed by: ORTHOPAEDIC SURGERY

## 2022-01-05 PROCEDURE — 1101F PR PT FALLS ASSESS DOC 0-1 FALLS W/OUT INJ PAST YR: ICD-10-PCS | Mod: HCNC,CPTII,S$GLB, | Performed by: ORTHOPAEDIC SURGERY

## 2022-01-07 PROBLEM — M17.11 PRIMARY OSTEOARTHRITIS OF RIGHT KNEE: Status: ACTIVE | Noted: 2022-01-07

## 2022-01-07 NOTE — PROGRESS NOTES
Subjective:      Patient ID: Herbre Camarillo is a 81 y.o. male.    Chief Complaint:   Pain of the Left Knee and Pain of the Right Knee    HPI  He comes in today to discuss bilateral knee osteoarthritis pain.  The left side hurts more than the right.  It causes him to have to use a cane for ambulation.  He has 8/10 pain on a daily basis interfering with his activities, and intermittent night pain interfering with sleep.  This is not responded to injections, oral NSAIDs.  He is ready to discuss surgery.      Objective:        Ortho/SPM Exam      There is is a mild varus deformity, which is partially passively correctable.  Active range of motion is 5-115 degrees.  Anterior crepitus with active extension.  Patellar mobility is limited.  Boggy synovitis without effusion.  Medial joint line tenderness predominates.  No instability to varus/valgus/Lachman's stressing.  No pain in the groin with flexion and internal rotation of the hip which is not limited.  Skin intact.  Distal neurovascular intact.  Flip test negative.      IMAGING:  Weightbearing x-rays show end-stage varus gonarthrosis bilaterally with bone-on-bone in the medial compartment.  There are no focal bone defects  Assessment:     Advanced DJD, knees    Plan:   Left total knee arthroplasty.  He wants to do this later on in the spring, and he will call us for scheduling after he has gotten his dental implants finished.  We reviewed the risks and benefits of the surgery with the patient and/or family prior to surgery including but not limited to risk of infection, bleeding, injury to nerves and blood vessels, need for revision surgery, continued pain, blood clots, cardiopulmonary complications, death.  We discussed surgical options including implant type, and why I believe the implant selected is a good match for the patient's needs. The patient expressed understanding and would like to proceed with surgery.       The patient's pathophysiology was explained in  detail with reference to x-rays, models, other visual aids as appropriate.  Treatment options were discussed in detail.  Questions were invited and answered to the patient's satisfaction.    Greater than 50% of this 15 minute visit was devoted to counseling and coordination of care      Rodolfo Nguyen MD  Orthopedic Surgery

## 2022-01-11 ENCOUNTER — IMMUNIZATION (OUTPATIENT)
Dept: PHARMACY | Facility: CLINIC | Age: 82
End: 2022-01-11
Payer: MEDICARE

## 2022-01-11 DIAGNOSIS — Z23 NEED FOR VACCINATION: Primary | ICD-10-CM

## 2022-02-02 ENCOUNTER — OFFICE VISIT (OUTPATIENT)
Dept: CARDIOLOGY | Facility: CLINIC | Age: 82
End: 2022-02-02
Payer: MEDICARE

## 2022-02-02 VITALS
WEIGHT: 212.5 LBS | SYSTOLIC BLOOD PRESSURE: 136 MMHG | HEART RATE: 57 BPM | HEIGHT: 67 IN | DIASTOLIC BLOOD PRESSURE: 65 MMHG | BODY MASS INDEX: 33.35 KG/M2

## 2022-02-02 DIAGNOSIS — I70.0 ATHEROSCLEROSIS OF AORTA: ICD-10-CM

## 2022-02-02 DIAGNOSIS — I25.10 CORONARY ARTERY DISEASE INVOLVING NATIVE CORONARY ARTERY OF NATIVE HEART WITHOUT ANGINA PECTORIS: Primary | ICD-10-CM

## 2022-02-02 DIAGNOSIS — E78.5 HYPERLIPIDEMIA, UNSPECIFIED HYPERLIPIDEMIA TYPE: ICD-10-CM

## 2022-02-02 PROCEDURE — 3288F FALL RISK ASSESSMENT DOCD: CPT | Mod: HCNC,CPTII,S$GLB, | Performed by: INTERNAL MEDICINE

## 2022-02-02 PROCEDURE — 99213 OFFICE O/P EST LOW 20 MIN: CPT | Mod: HCNC,S$GLB,, | Performed by: INTERNAL MEDICINE

## 2022-02-02 PROCEDURE — 1126F PR PAIN SEVERITY QUANTIFIED, NO PAIN PRESENT: ICD-10-PCS | Mod: HCNC,CPTII,S$GLB, | Performed by: INTERNAL MEDICINE

## 2022-02-02 PROCEDURE — 99499 UNLISTED E&M SERVICE: CPT | Mod: HCNC,S$GLB,, | Performed by: INTERNAL MEDICINE

## 2022-02-02 PROCEDURE — 99213 PR OFFICE/OUTPT VISIT, EST, LEVL III, 20-29 MIN: ICD-10-PCS | Mod: HCNC,S$GLB,, | Performed by: INTERNAL MEDICINE

## 2022-02-02 PROCEDURE — 1101F PT FALLS ASSESS-DOCD LE1/YR: CPT | Mod: HCNC,CPTII,S$GLB, | Performed by: INTERNAL MEDICINE

## 2022-02-02 PROCEDURE — 3078F DIAST BP <80 MM HG: CPT | Mod: HCNC,CPTII,S$GLB, | Performed by: INTERNAL MEDICINE

## 2022-02-02 PROCEDURE — 3075F SYST BP GE 130 - 139MM HG: CPT | Mod: HCNC,CPTII,S$GLB, | Performed by: INTERNAL MEDICINE

## 2022-02-02 PROCEDURE — 1126F AMNT PAIN NOTED NONE PRSNT: CPT | Mod: HCNC,CPTII,S$GLB, | Performed by: INTERNAL MEDICINE

## 2022-02-02 PROCEDURE — 99999 PR PBB SHADOW E&M-EST. PATIENT-LVL III: CPT | Mod: PBBFAC,HCNC,, | Performed by: INTERNAL MEDICINE

## 2022-02-02 PROCEDURE — 3078F PR MOST RECENT DIASTOLIC BLOOD PRESSURE < 80 MM HG: ICD-10-PCS | Mod: HCNC,CPTII,S$GLB, | Performed by: INTERNAL MEDICINE

## 2022-02-02 PROCEDURE — 99999 PR PBB SHADOW E&M-EST. PATIENT-LVL III: ICD-10-PCS | Mod: PBBFAC,HCNC,, | Performed by: INTERNAL MEDICINE

## 2022-02-02 PROCEDURE — 3075F PR MOST RECENT SYSTOLIC BLOOD PRESS GE 130-139MM HG: ICD-10-PCS | Mod: HCNC,CPTII,S$GLB, | Performed by: INTERNAL MEDICINE

## 2022-02-02 PROCEDURE — 99499 RISK ADDL DX/OHS AUDIT: ICD-10-PCS | Mod: HCNC,S$GLB,, | Performed by: INTERNAL MEDICINE

## 2022-02-02 PROCEDURE — 3288F PR FALLS RISK ASSESSMENT DOCUMENTED: ICD-10-PCS | Mod: HCNC,CPTII,S$GLB, | Performed by: INTERNAL MEDICINE

## 2022-02-02 PROCEDURE — 1101F PR PT FALLS ASSESS DOC 0-1 FALLS W/OUT INJ PAST YR: ICD-10-PCS | Mod: HCNC,CPTII,S$GLB, | Performed by: INTERNAL MEDICINE

## 2022-02-02 NOTE — PROGRESS NOTES
Subjective:    Patient ID:  Herber Camarillo is a 81 y.o. male who presents for follow-up of CAD    HPI   The patient is a 81 year male followed  post NSTEMI [?PCI to LAD] 2000. He continues to do well and has no chest pain or WILKINS. He remains very active.    Lab Results   Component Value Date     03/31/2021    K 4.2 03/31/2021     03/31/2021    CO2 24 03/31/2021    BUN 17 03/31/2021    CREATININE 1.03 03/31/2021     03/31/2021    HGBA1C 4.9 03/31/2021    AST 26 03/31/2021    ALT 29 03/31/2021    ALBUMIN 3.6 03/31/2021    PROT 6.6 03/31/2021    BILITOT 1.0 03/31/2021    WBC 10.15 03/31/2021    HGB 17.4 03/31/2021    HCT 51.5 03/31/2021    MCV 97 03/31/2021     03/31/2021    INR 1.0 12/16/2016    PSA 0.03 03/31/2021    TSH 1.987 12/16/2016         Lab Results   Component Value Date    CHOL 127 10/20/2021    HDL 40 10/20/2021    TRIG 110 10/20/2021       Lab Results   Component Value Date    LDLCALC 65.0 10/20/2021       Past Medical History:   Diagnosis Date    Arthritis     CAD (coronary artery disease) 10/30/2012    Cataract     Dermatochalasis of both upper eyelids     Fever blister     Glucose intolerance (impaired glucose tolerance) 7/24/2013    Heart attack     Hyperlipidemia 10/30/2012    Joint pain     Keloid cicatrix     Lung nodule, solitary 4/30/2015    Non morbid obesity due to excess calories 10/20/2015    Obesity     Overweight(278.02) 9/5/2013    Prostate cancer     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       Current Outpatient Medications:     allopurinoL (ZYLOPRIM) 300 MG tablet, Take 1 tablet (300 mg total) by mouth once daily., Disp: 90 tablet, Rfl: 3    aspirin (ECOTRIN) 81 MG EC tablet, Take 81 mg by mouth once daily., Disp: , Rfl:     atorvastatin (LIPITOR) 20 MG tablet, Take 1 tablet (20 mg total) by mouth once daily., Disp: 90 tablet, Rfl: 3    b complex vitamins tablet, Take 1 tablet by mouth once daily., Disp: , Rfl:     metoprolol succinate (TOPROL-XL)  25 MG 24 hr tablet, Take 1 tablet (25 mg total) by mouth once daily., Disp: 90 tablet, Rfl: 3    omega-3 fatty acids-vitamin E 1,000 mg Cap, Take by mouth., Disp: , Rfl:     Current Facility-Administered Medications:     sodium chloride 0.9% flush 10 mL, 10 mL, Intravenous, PRN, Nam Morin MD    sodium chloride 0.9% flush 10 mL, 10 mL, Intravenous, PRN, Nam Morin MD          Review of Systems   Constitutional: Negative for decreased appetite, diaphoresis, fever, malaise/fatigue, weight gain and weight loss.   HENT: Negative for congestion, ear discharge, ear pain and nosebleeds.    Eyes: Negative for blurred vision, double vision and visual disturbance.   Cardiovascular: Negative for chest pain, claudication, cyanosis, dyspnea on exertion, irregular heartbeat, leg swelling, near-syncope, orthopnea, palpitations, paroxysmal nocturnal dyspnea and syncope.   Respiratory: Negative for cough, hemoptysis, shortness of breath, sleep disturbances due to breathing, snoring, sputum production and wheezing.    Endocrine: Negative for polydipsia, polyphagia and polyuria.   Hematologic/Lymphatic: Negative for adenopathy and bleeding problem. Does not bruise/bleed easily.   Skin: Negative for color change, nail changes, poor wound healing and rash.   Musculoskeletal: Positive for joint pain (knee). Negative for muscle cramps and muscle weakness.   Gastrointestinal: Negative for abdominal pain, anorexia, change in bowel habit, hematochezia, nausea and vomiting.   Genitourinary: Negative for dysuria, frequency and hematuria.   Neurological: Negative for brief paralysis, difficulty with concentration, excessive daytime sleepiness, dizziness, focal weakness, headaches, light-headedness, seizures, vertigo and weakness.   Psychiatric/Behavioral: Negative for altered mental status and depression.   Allergic/Immunologic: Negative for persistent infections.        Objective:/65 (BP Location: Left arm, Patient Position:  "Sitting, BP Method: Medium (Automatic))   Pulse (!) 57   Ht 5' 7" (1.702 m)   Wt 96.4 kg (212 lb 8.4 oz)   BMI 33.29 kg/m²             Physical Exam  Constitutional:       Appearance: He is well-developed and well-nourished. He is obese.   HENT:      Head: Normocephalic.      Right Ear: External ear normal.      Left Ear: External ear normal.      Nose: Nose normal.        Comments: Inspection of lips, teeth and gums normalEyes:      General: No scleral icterus.     Extraocular Movements: EOM normal.      Pupils: Pupils are equal, round, and reactive to light.   Neck:      Thyroid: No thyromegaly.      Vascular: No JVD.      Trachea: No tracheal deviation.   Cardiovascular:      Rate and Rhythm: Normal rate and regular rhythm.      Pulses: Intact distal pulses.           Carotid pulses are 2+ on the right side and 2+ on the left side.       Dorsalis pedis pulses are 2+ on the right side and 2+ on the left side.        Posterior tibial pulses are 2+ on the right side and 2+ on the left side.      Heart sounds: No murmur heard.  No friction rub. No gallop.    Pulmonary:      Effort: Pulmonary effort is normal.      Breath sounds: Normal breath sounds.   Abdominal:      General: Bowel sounds are normal. There is no distension.      Palpations: There is no hepatosplenomegaly.      Tenderness: There is no abdominal tenderness. There is no guarding.   Musculoskeletal:         General: No tenderness or edema. Normal range of motion.      Cervical back: Normal range of motion and neck supple.   Lymphadenopathy:      Comments: Palpation of neck and groin lymph nodes normal   Skin:     General: Skin is dry.      Comments: Palpation of skin normal   Neurological:      Mental Status: He is alert and oriented to person, place, and time.      Cranial Nerves: No cranial nerve deficit.      Motor: No abnormal muscle tone.      Coordination: Coordination normal.   Psychiatric:         Behavior: Behavior normal.         Thought " Content: Thought content normal.         Judgment: Judgment normal.           Assessment:       1. Coronary artery disease involving native coronary artery of native heart without angina pectoris    2. Atherosclerosis of aorta: see CT scan 7/15    3. Hyperlipidemia, unspecified hyperlipidemia type         Plan:       Diagnoses and all orders for this visit:    Coronary artery disease involving native coronary artery of native heart without angina pectoris  -     Comprehensive Metabolic Panel; Future; Expected date: 02/02/2023  -     CBC Auto Differential; Future; Expected date: 02/02/2023    Atherosclerosis of aorta: see CT scan 7/15    Hyperlipidemia, unspecified hyperlipidemia type  -     Lipid Panel; Future; Expected date: 02/02/2023

## 2022-03-23 RX ORDER — ALLOPURINOL 300 MG/1
TABLET ORAL
Qty: 90 TABLET | Refills: 3 | Status: SHIPPED | OUTPATIENT
Start: 2022-03-23 | End: 2022-06-13

## 2022-04-24 ENCOUNTER — PATIENT MESSAGE (OUTPATIENT)
Dept: ORTHOPEDICS | Facility: CLINIC | Age: 82
End: 2022-04-24
Payer: MEDICARE

## 2022-05-02 ENCOUNTER — EDUCATION (OUTPATIENT)
Dept: ORTHOPEDICS | Facility: CLINIC | Age: 82
End: 2022-05-02

## 2022-05-13 ENCOUNTER — PATIENT MESSAGE (OUTPATIENT)
Dept: ADMINISTRATIVE | Facility: OTHER | Age: 82
End: 2022-05-13
Payer: MEDICARE

## 2022-05-13 DIAGNOSIS — M17.12 PRIMARY OSTEOARTHRITIS OF LEFT KNEE: Primary | ICD-10-CM

## 2022-05-16 RX ORDER — ATORVASTATIN CALCIUM 20 MG/1
20 TABLET, FILM COATED ORAL DAILY
Qty: 90 TABLET | Refills: 3 | Status: SHIPPED | OUTPATIENT
Start: 2022-05-16 | End: 2023-04-24

## 2022-05-16 RX ORDER — METOPROLOL SUCCINATE 25 MG/1
25 TABLET, EXTENDED RELEASE ORAL DAILY
Qty: 90 TABLET | Refills: 3 | Status: SHIPPED | OUTPATIENT
Start: 2022-05-16 | End: 2022-09-30 | Stop reason: SDUPTHER

## 2022-05-20 ENCOUNTER — PATIENT MESSAGE (OUTPATIENT)
Dept: ADMINISTRATIVE | Facility: OTHER | Age: 82
End: 2022-05-20
Payer: MEDICARE

## 2022-05-23 ENCOUNTER — PATIENT MESSAGE (OUTPATIENT)
Dept: ADMINISTRATIVE | Facility: OTHER | Age: 82
End: 2022-05-23
Payer: MEDICARE

## 2022-05-31 ENCOUNTER — PATIENT MESSAGE (OUTPATIENT)
Dept: ADMINISTRATIVE | Facility: OTHER | Age: 82
End: 2022-05-31
Payer: MEDICARE

## 2022-05-31 ENCOUNTER — PATIENT MESSAGE (OUTPATIENT)
Dept: ORTHOPEDICS | Facility: CLINIC | Age: 82
End: 2022-05-31
Payer: MEDICARE

## 2022-05-31 PROBLEM — Z78.9 IMPAIRED MOBILITY AND ADLS: Status: ACTIVE | Noted: 2022-05-31

## 2022-05-31 PROBLEM — Z74.09 IMPAIRED MOBILITY AND ADLS: Status: ACTIVE | Noted: 2022-05-31

## 2022-06-06 ENCOUNTER — PATIENT MESSAGE (OUTPATIENT)
Dept: ADMINISTRATIVE | Facility: OTHER | Age: 82
End: 2022-06-06
Payer: MEDICARE

## 2022-06-06 NOTE — PRE-PROCEDURE INSTRUCTIONS
Chart review; triage plan initiated.    Spoke to patient regarding upcoming scheduled surgery.  Name, , and allergies verified.  Medical, surgical, and anesthesia history reviewed.  Instructed to hold vitamins, supplements and NSAIDs for one week prior to surgery. Informed that the remaining medication instructions will be provided at the POC visit. Pt verbalized understanding.

## 2022-06-06 NOTE — ANESTHESIA PAT ROS NOTE
06/06/2022  Herber Camarillo is a 81 y.o., male.      Pre-op Assessment          Review of Systems         Anesthesia Assessment: Preoperative EQUATION    Planned Procedure: Procedure(s) (LRB):  ARTHROPLASTY, KNEE/ KAVON NEXGEN/ CEMENTED TIBIA (Left)  Requested Anesthesia Type:Regional  Surgeon: Rodolfo Nguyen MD  Service: Orthopedics  Known or anticipated Date of Surgery:6/20/2022    Surgeon notes: reviewed    Electronic QUestionnaire Assessment completed via nurse interview with patient.        Triage considerations:     The patient has no apparent active cardiac condition (No unstable coronary Syndrome such as severe unstable angina or recent [<1 month] myocardial infarction, decompensated CHF, severe valvular   disease or significant arrhythmia)    Previous anesthesia records:MAC, CSE and Patient denies personal history of anesthesia complications     Last PCP note: > 1 year ago , within [facility] , Dr. Fidelina Loyola     Subspecialty notes: Ortho, Cardiology (Dr. Lauri Nino), Dermatology (Dr. Vasquez)    Other important co-morbidities: HLD, Obesity and CAD (asa 81mg), NTEMI (2000 PCI to LAD), Vertigo, Uric Acid Crystallopathy (Allopurinol), h/o Prostate CA (over 20 yrs ago, dc'd by Urology), Lung Nodule (stable), Vit D Def, Atheroclerosis of Aorta, RTHA (2017 Dr. 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     10/20/21 Lipid Panel    3/31/21 Uric Acid, PSA, A1C, CMP, CBC    2/13/19 Chest CT  IMPRESSION:   1. No acute cardiopulmonary disease.  2. Stable right upper lobe nodule, probably benign with no further follow-up recommended.  3. Aortic and coronary atherosclerosis.  4. Status post cholecystectomy and other findings as above.    1/15/19 Thoracic XR  IMPRESSION:   Degenerative change.     1/15/19 Lumbar XR  IMPRESSION:   Degenerative  change.    10/21/16 EKG    4/20/15 Cervical XR   Cervical spine AP lateral.    There is significant degenerative change beginning at C5 through C6-7 interspace narrowing and osteophyte forming.  There is some mild facet hypertrophy and uncinate spurring.  The odontoid is intact where visualized.    5/16/13 Stress Test   CONCLUSIONS     1 - Normal left ventricular function (EF 65%).     2 - Normal diastolic function.     3 - Moderate left atrial enlargement.     4 - Normal right ventricular function .     11/16/11 In [facility]: Carotid US            Instructions given. (See in Nurse's note)    Optimization:  Anesthesia Preop Clinic Assessment  Indicated Will Schedule POC     Medical Opinion Indicated: Cardiology (Dr. Nino)       Sub-specialist consult indicated:   [facility] NP      Plan:    Testing:  BMP, Hematology Profile and PT/INR   Pre-anesthesia  visit       Visit focus: possible regional anesthesia and/or nerve block      Consultation:[facility] NP for medical and anesthesia optimization     Patient  has previously scheduled Medical Appointment: 6/9, 6/14    Navigation: Tests Scheduled.              Consults scheduled.             Results will be tracked by Preop Clinic.     Patient is optimized for surgery     6/16/22: Optimized per Cardiology (Mica) 6/16/22.     Amee Herbert, RASHAD  Perioperative Medicine  [facility]       6/16/22 Cardiology (Dr. Nino):        Low CV risk for planned procedure

## 2022-06-09 ENCOUNTER — HOSPITAL ENCOUNTER (OUTPATIENT)
Dept: RADIOLOGY | Facility: HOSPITAL | Age: 82
Discharge: HOME | End: 2022-06-09
Attending: PHYSICIAN ASSISTANT
Payer: MEDICARE

## 2022-06-09 ENCOUNTER — OFFICE VISIT (OUTPATIENT)
Dept: ORTHOPEDICS | Facility: CLINIC | Age: 82
End: 2022-06-09
Payer: MEDICARE

## 2022-06-09 VITALS — HEIGHT: 67 IN | BODY MASS INDEX: 31.13 KG/M2 | WEIGHT: 198.38 LBS

## 2022-06-09 DIAGNOSIS — M17.12 PRIMARY OSTEOARTHRITIS OF LEFT KNEE: Primary | ICD-10-CM

## 2022-06-09 DIAGNOSIS — M17.12 PRIMARY OSTEOARTHRITIS OF LEFT KNEE: ICD-10-CM

## 2022-06-09 PROCEDURE — 1101F PT FALLS ASSESS-DOCD LE1/YR: CPT | Mod: CPTII,S$GLB,, | Performed by: PHYSICIAN ASSISTANT

## 2022-06-09 PROCEDURE — 99999 PR PBB SHADOW E&M-EST. PATIENT-LVL III: CPT | Mod: PBBFAC,,, | Performed by: PHYSICIAN ASSISTANT

## 2022-06-09 PROCEDURE — 1125F AMNT PAIN NOTED PAIN PRSNT: CPT | Mod: CPTII,S$GLB,, | Performed by: PHYSICIAN ASSISTANT

## 2022-06-09 PROCEDURE — 1125F PR PAIN SEVERITY QUANTIFIED, PAIN PRESENT: ICD-10-PCS | Mod: CPTII,S$GLB,, | Performed by: PHYSICIAN ASSISTANT

## 2022-06-09 PROCEDURE — 99213 OFFICE O/P EST LOW 20 MIN: CPT | Mod: S$GLB,,, | Performed by: PHYSICIAN ASSISTANT

## 2022-06-09 PROCEDURE — 99999 PR PBB SHADOW E&M-EST. PATIENT-LVL III: ICD-10-PCS | Mod: PBBFAC,,, | Performed by: PHYSICIAN ASSISTANT

## 2022-06-09 PROCEDURE — 73560 X-RAY EXAM OF KNEE 1 OR 2: CPT | Mod: TC,LT

## 2022-06-09 PROCEDURE — 1101F PR PT FALLS ASSESS DOC 0-1 FALLS W/OUT INJ PAST YR: ICD-10-PCS | Mod: CPTII,S$GLB,, | Performed by: PHYSICIAN ASSISTANT

## 2022-06-09 PROCEDURE — 3288F FALL RISK ASSESSMENT DOCD: CPT | Mod: CPTII,S$GLB,, | Performed by: PHYSICIAN ASSISTANT

## 2022-06-09 PROCEDURE — 3288F PR FALLS RISK ASSESSMENT DOCUMENTED: ICD-10-PCS | Mod: CPTII,S$GLB,, | Performed by: PHYSICIAN ASSISTANT

## 2022-06-09 PROCEDURE — 73560 XR KNEE 1 OR 2 VIEW LEFT: ICD-10-PCS | Mod: 26,LT,, | Performed by: RADIOLOGY

## 2022-06-09 PROCEDURE — 99213 PR OFFICE/OUTPT VISIT, EST, LEVL III, 20-29 MIN: ICD-10-PCS | Mod: S$GLB,,, | Performed by: PHYSICIAN ASSISTANT

## 2022-06-09 PROCEDURE — 73560 X-RAY EXAM OF KNEE 1 OR 2: CPT | Mod: 26,LT,, | Performed by: RADIOLOGY

## 2022-06-13 ENCOUNTER — PATIENT MESSAGE (OUTPATIENT)
Dept: ADMINISTRATIVE | Facility: OTHER | Age: 82
End: 2022-06-13
Payer: MEDICARE

## 2022-06-13 RX ORDER — IBUPROFEN 200 MG
200 TABLET ORAL EVERY 6 HOURS PRN
Status: ON HOLD | COMMUNITY
End: 2022-06-20 | Stop reason: HOSPADM

## 2022-06-14 ENCOUNTER — OFFICE VISIT (OUTPATIENT)
Dept: INTERNAL MEDICINE | Facility: CLINIC | Age: 82
End: 2022-06-14
Payer: MEDICARE

## 2022-06-14 ENCOUNTER — HOSPITAL ENCOUNTER (OUTPATIENT)
Dept: CARDIOLOGY | Facility: CLINIC | Age: 82
Discharge: HOME | End: 2022-06-14
Payer: MEDICARE

## 2022-06-14 ENCOUNTER — LAB VISIT (OUTPATIENT)
Dept: LAB | Facility: HOSPITAL | Age: 82
End: 2022-06-14
Attending: ANESTHESIOLOGY
Payer: MEDICARE

## 2022-06-14 VITALS
BODY MASS INDEX: 31.86 KG/M2 | HEIGHT: 67 IN | HEART RATE: 53 BPM | WEIGHT: 203 LBS | SYSTOLIC BLOOD PRESSURE: 146 MMHG | OXYGEN SATURATION: 97 % | TEMPERATURE: 98 F | DIASTOLIC BLOOD PRESSURE: 71 MMHG

## 2022-06-14 DIAGNOSIS — E79.89 URIC ACID CRYSTALLOPATHY: ICD-10-CM

## 2022-06-14 DIAGNOSIS — E66.09 NON MORBID OBESITY DUE TO EXCESS CALORIES: ICD-10-CM

## 2022-06-14 DIAGNOSIS — C61 PROSTATE CANCER: ICD-10-CM

## 2022-06-14 DIAGNOSIS — E78.5 HYPERLIPIDEMIA, UNSPECIFIED HYPERLIPIDEMIA TYPE: ICD-10-CM

## 2022-06-14 DIAGNOSIS — M79.609 PAIN IN EXTREMITY, UNSPECIFIED EXTREMITY: ICD-10-CM

## 2022-06-14 DIAGNOSIS — I25.10 CORONARY ARTERY DISEASE INVOLVING NATIVE CORONARY ARTERY OF NATIVE HEART WITHOUT ANGINA PECTORIS: ICD-10-CM

## 2022-06-14 DIAGNOSIS — M17.12 PRIMARY OSTEOARTHRITIS OF LEFT KNEE: ICD-10-CM

## 2022-06-14 DIAGNOSIS — Z01.818 PREOPERATIVE TESTING: ICD-10-CM

## 2022-06-14 DIAGNOSIS — R91.1 LUNG NODULE, SOLITARY: ICD-10-CM

## 2022-06-14 LAB
ANION GAP SERPL CALC-SCNC: 9 MMOL/L (ref 8–16)
BUN SERPL-MCNC: 11 MG/DL (ref 8–23)
CALCIUM SERPL-MCNC: 9.3 MG/DL (ref 8.7–10.5)
CHLORIDE SERPL-SCNC: 103 MMOL/L (ref 95–110)
CO2 SERPL-SCNC: 25 MMOL/L (ref 23–29)
CREAT SERPL-MCNC: 0.9 MG/DL (ref 0.5–1.4)
ERYTHROCYTE [DISTWIDTH] IN BLOOD BY AUTOMATED COUNT: 12 % (ref 11.5–14.5)
EST. GFR  (AFRICAN AMERICAN): >60 ML/MIN/1.73 M^2
EST. GFR  (NON AFRICAN AMERICAN): >60 ML/MIN/1.73 M^2
GLUCOSE SERPL-MCNC: 91 MG/DL (ref 70–110)
HCT VFR BLD AUTO: 51.3 % (ref 40–54)
HGB BLD-MCNC: 17.1 G/DL (ref 14–18)
INR PPP: 1 (ref 0.8–1.2)
MCH RBC QN AUTO: 31.3 PG (ref 27–31)
MCHC RBC AUTO-ENTMCNC: 33.3 G/DL (ref 32–36)
MCV RBC AUTO: 94 FL (ref 82–98)
PLATELET # BLD AUTO: 243 K/UL (ref 150–450)
PMV BLD AUTO: 10.2 FL (ref 9.2–12.9)
POTASSIUM SERPL-SCNC: 4.9 MMOL/L (ref 3.5–5.1)
PROTHROMBIN TIME: 10.9 SEC (ref 9–12.5)
RBC # BLD AUTO: 5.46 M/UL (ref 4.6–6.2)
SODIUM SERPL-SCNC: 137 MMOL/L (ref 136–145)
WBC # BLD AUTO: 7.29 K/UL (ref 3.9–12.7)

## 2022-06-14 PROCEDURE — 85610 PROTHROMBIN TIME: CPT | Performed by: ANESTHESIOLOGY

## 2022-06-14 PROCEDURE — 3078F DIAST BP <80 MM HG: CPT | Mod: CPTII,S$GLB,, | Performed by: NURSE PRACTITIONER

## 2022-06-14 PROCEDURE — 3078F PR MOST RECENT DIASTOLIC BLOOD PRESSURE < 80 MM HG: ICD-10-PCS | Mod: CPTII,S$GLB,, | Performed by: NURSE PRACTITIONER

## 2022-06-14 PROCEDURE — 3077F SYST BP >= 140 MM HG: CPT | Mod: CPTII,S$GLB,, | Performed by: NURSE PRACTITIONER

## 2022-06-14 PROCEDURE — 93005 ELECTROCARDIOGRAM TRACING: CPT | Mod: S$GLB,,, | Performed by: ANESTHESIOLOGY

## 2022-06-14 PROCEDURE — 36415 COLL VENOUS BLD VENIPUNCTURE: CPT | Performed by: ANESTHESIOLOGY

## 2022-06-14 PROCEDURE — 1160F PR REVIEW ALL MEDS BY PRESCRIBER/CLIN PHARMACIST DOCUMENTED: ICD-10-PCS | Mod: CPTII,S$GLB,, | Performed by: NURSE PRACTITIONER

## 2022-06-14 PROCEDURE — 3077F PR MOST RECENT SYSTOLIC BLOOD PRESSURE >= 140 MM HG: ICD-10-PCS | Mod: CPTII,S$GLB,, | Performed by: NURSE PRACTITIONER

## 2022-06-14 PROCEDURE — 93005 EKG 12-LEAD: ICD-10-PCS | Mod: S$GLB,,, | Performed by: ANESTHESIOLOGY

## 2022-06-14 PROCEDURE — 99999 PR PBB SHADOW E&M-EST. PATIENT-LVL IV: CPT | Mod: PBBFAC,,, | Performed by: NURSE PRACTITIONER

## 2022-06-14 PROCEDURE — 93010 ELECTROCARDIOGRAM REPORT: CPT | Mod: S$GLB,,, | Performed by: INTERNAL MEDICINE

## 2022-06-14 PROCEDURE — 1159F PR MEDICATION LIST DOCUMENTED IN MEDICAL RECORD: ICD-10-PCS | Mod: CPTII,S$GLB,, | Performed by: NURSE PRACTITIONER

## 2022-06-14 PROCEDURE — 93010 EKG 12-LEAD: ICD-10-PCS | Mod: S$GLB,,, | Performed by: INTERNAL MEDICINE

## 2022-06-14 PROCEDURE — 99214 OFFICE O/P EST MOD 30 MIN: CPT | Mod: S$GLB,,, | Performed by: NURSE PRACTITIONER

## 2022-06-14 PROCEDURE — 85027 COMPLETE CBC AUTOMATED: CPT | Performed by: ANESTHESIOLOGY

## 2022-06-14 PROCEDURE — 99214 PR OFFICE/OUTPT VISIT, EST, LEVL IV, 30-39 MIN: ICD-10-PCS | Mod: S$GLB,,, | Performed by: NURSE PRACTITIONER

## 2022-06-14 PROCEDURE — 99999 PR PBB SHADOW E&M-EST. PATIENT-LVL IV: ICD-10-PCS | Mod: PBBFAC,,, | Performed by: NURSE PRACTITIONER

## 2022-06-14 PROCEDURE — 1159F MED LIST DOCD IN RCRD: CPT | Mod: CPTII,S$GLB,, | Performed by: NURSE PRACTITIONER

## 2022-06-14 PROCEDURE — 1160F RVW MEDS BY RX/DR IN RCRD: CPT | Mod: CPTII,S$GLB,, | Performed by: NURSE PRACTITIONER

## 2022-06-14 PROCEDURE — 80048 BASIC METABOLIC PNL TOTAL CA: CPT | Performed by: ANESTHESIOLOGY

## 2022-06-14 RX ORDER — PREGABALIN 75 MG/1
75 CAPSULE ORAL NIGHTLY
Status: CANCELLED | OUTPATIENT
Start: 2022-06-14

## 2022-06-14 RX ORDER — NALOXONE HCL 0.4 MG/ML
0.02 VIAL (ML) INJECTION
Status: CANCELLED | OUTPATIENT
Start: 2022-06-14

## 2022-06-14 RX ORDER — ONDANSETRON 2 MG/ML
4 INJECTION INTRAMUSCULAR; INTRAVENOUS EVERY 8 HOURS PRN
Status: CANCELLED | OUTPATIENT
Start: 2022-06-14

## 2022-06-14 RX ORDER — MIDAZOLAM HYDROCHLORIDE 1 MG/ML
4 INJECTION INTRAMUSCULAR; INTRAVENOUS
Status: CANCELLED | OUTPATIENT
Start: 2022-06-14 | End: 2022-06-15

## 2022-06-14 RX ORDER — SODIUM CHLORIDE 9 MG/ML
INJECTION, SOLUTION INTRAVENOUS CONTINUOUS
Status: CANCELLED | OUTPATIENT
Start: 2022-06-14 | End: 2022-06-15

## 2022-06-14 RX ORDER — MUPIROCIN 20 MG/G
1 OINTMENT TOPICAL
Status: CANCELLED | OUTPATIENT
Start: 2022-06-14

## 2022-06-14 RX ORDER — FENTANYL CITRATE 50 UG/ML
100 INJECTION, SOLUTION INTRAMUSCULAR; INTRAVENOUS
Status: CANCELLED | OUTPATIENT
Start: 2022-06-14 | End: 2022-06-15

## 2022-06-14 RX ORDER — FENTANYL CITRATE 50 UG/ML
25 INJECTION, SOLUTION INTRAMUSCULAR; INTRAVENOUS EVERY 5 MIN PRN
Status: CANCELLED | OUTPATIENT
Start: 2022-06-14

## 2022-06-14 RX ORDER — CELECOXIB 200 MG/1
400 CAPSULE ORAL
Status: CANCELLED | OUTPATIENT
Start: 2022-06-14

## 2022-06-14 RX ORDER — SODIUM CHLORIDE 9 MG/ML
INJECTION, SOLUTION INTRAVENOUS
Status: CANCELLED | OUTPATIENT
Start: 2022-06-14

## 2022-06-14 RX ORDER — TALC
6 POWDER (GRAM) TOPICAL NIGHTLY PRN
Status: CANCELLED | OUTPATIENT
Start: 2022-06-14

## 2022-06-14 RX ORDER — ASPIRIN 81 MG/1
81 TABLET ORAL 2 TIMES DAILY
Status: CANCELLED | OUTPATIENT
Start: 2022-06-14

## 2022-06-14 RX ORDER — ACETAMINOPHEN 500 MG
1000 TABLET ORAL
Status: CANCELLED | OUTPATIENT
Start: 2022-06-14

## 2022-06-14 RX ORDER — POLYETHYLENE GLYCOL 3350 17 G/17G
17 POWDER, FOR SOLUTION ORAL DAILY
Status: CANCELLED | OUTPATIENT
Start: 2022-06-14

## 2022-06-14 RX ORDER — PROCHLORPERAZINE EDISYLATE 5 MG/ML
5 INJECTION INTRAMUSCULAR; INTRAVENOUS EVERY 6 HOURS PRN
Status: CANCELLED | OUTPATIENT
Start: 2022-06-14

## 2022-06-14 RX ORDER — BISACODYL 10 MG
10 SUPPOSITORY, RECTAL RECTAL EVERY 12 HOURS PRN
Status: CANCELLED | OUTPATIENT
Start: 2022-06-14

## 2022-06-14 RX ORDER — FAMOTIDINE 20 MG/1
20 TABLET, FILM COATED ORAL 2 TIMES DAILY
Status: CANCELLED | OUTPATIENT
Start: 2022-06-14

## 2022-06-14 RX ORDER — CELECOXIB 200 MG/1
200 CAPSULE ORAL DAILY
Status: CANCELLED | OUTPATIENT
Start: 2022-06-14

## 2022-06-14 RX ORDER — OXYCODONE HYDROCHLORIDE 5 MG/1
5 TABLET ORAL
Status: CANCELLED | OUTPATIENT
Start: 2022-06-14

## 2022-06-14 RX ORDER — ACETAMINOPHEN 500 MG
1000 TABLET ORAL EVERY 6 HOURS
Status: CANCELLED | OUTPATIENT
Start: 2022-06-14

## 2022-06-14 RX ORDER — OXYCODONE HYDROCHLORIDE 5 MG/1
10 TABLET ORAL
Status: CANCELLED | OUTPATIENT
Start: 2022-06-14

## 2022-06-14 RX ORDER — LIDOCAINE HYDROCHLORIDE 10 MG/ML
1 INJECTION, SOLUTION EPIDURAL; INFILTRATION; INTRACAUDAL; PERINEURAL
Status: CANCELLED | OUTPATIENT
Start: 2022-06-14

## 2022-06-14 RX ORDER — SODIUM CHLORIDE 0.9 % (FLUSH) 0.9 %
10 SYRINGE (ML) INJECTION
Status: CANCELLED | OUTPATIENT
Start: 2022-06-14

## 2022-06-14 RX ORDER — MUPIROCIN 20 MG/G
1 OINTMENT TOPICAL 2 TIMES DAILY
Status: CANCELLED | OUTPATIENT
Start: 2022-06-14 | End: 2022-06-19

## 2022-06-14 RX ORDER — PREGABALIN 75 MG/1
75 CAPSULE ORAL
Status: CANCELLED | OUTPATIENT
Start: 2022-06-14

## 2022-06-14 RX ORDER — MORPHINE SULFATE 2 MG/ML
2 INJECTION, SOLUTION INTRAMUSCULAR; INTRAVENOUS
Status: CANCELLED | OUTPATIENT
Start: 2022-06-14

## 2022-06-14 RX ORDER — METHOCARBAMOL 750 MG/1
750 TABLET, FILM COATED ORAL 3 TIMES DAILY
Status: CANCELLED | OUTPATIENT
Start: 2022-06-14

## 2022-06-14 RX ORDER — AMOXICILLIN 250 MG
1 CAPSULE ORAL 2 TIMES DAILY
Status: CANCELLED | OUTPATIENT
Start: 2022-06-14

## 2022-06-14 NOTE — ASSESSMENT & PLAN NOTE
Patient would benefit from weight loss and has not set goals to achieve success. Lifestyle changes should be made by eating healthy, exercising at least 150 minutes weekly, and avoiding sedentary behavior.      There are no Wet Read(s) to document.

## 2022-06-14 NOTE — H&P
CC:  Left knee pain    Herber Camarillo is a 81 y.o. male with history of Left knee pain. Pain is worse with activity and weight bearing.  Patient has experienced interference of activities of daily living due to decreased range of motion and an increase in joint pain and swelling.  Patient has failed non-operative treatment including NSAIDs, corticosteroid injections, viscosupplement injections, and activity modification.  Herber Camarillo currently ambulates using assistive device .     Relevant medical conditions of significance in perioperative period:  Obesity:  BMI 32  CAD:presence of stents, on asa   HTN: on meds    Past Medical History:   Diagnosis Date    Arthritis     CAD (coronary artery disease) 10/30/2012    Cataract     Dermatochalasis of both upper eyelids     Fever blister     Glucose intolerance (impaired glucose tolerance) 7/24/2013    Heart attack     Hyperlipidemia 10/30/2012    Joint pain     Keloid cicatrix     Lung nodule, solitary 4/30/2015    Non morbid obesity due to excess calories 10/20/2015    Obesity     Overweight(278.02) 9/5/2013    Prostate cancer     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       Past Surgical History:   Procedure Laterality Date    CARDIAC CATHETERIZATION  01/2000    CATARACT EXTRACTION W/  INTRAOCULAR LENS IMPLANT Left 9/28/2020    Procedure: EXTRACTION, CATARACT, WITH IOL INSERTION;  Surgeon: Nam Morin MD;  Location: [facility] OR;  Service: Ophthalmology;  Laterality: Left;    CATARACT EXTRACTION W/  INTRAOCULAR LENS IMPLANT Right 10/12/2020    Procedure: EXTRACTION, CATARACT, WITH IOL INSERTION;  Surgeon: Nam Morin MD;  Location: [facility] OR;  Service: Ophthalmology;  Laterality: Right;  laser    CHOLECYSTECTOMY      HERNIA REPAIR      HIP SURGERY Right 01/11/2017    THR    PROSTATE SURGERY      SHOULDER OPEN ROTATOR CUFF REPAIR      TONSILLECTOMY         Family History   Problem Relation Age of Onset    Heart disease Father     Cancer Sister          Lung    Heart disease Brother     Heart disease Brother     Heart disease Mother     Hyperlipidemia Daughter     No Known Problems Son     No Known Problems Daughter     Amblyopia Neg Hx     Blindness Neg Hx     Cataracts Neg Hx     Diabetes Neg Hx     Glaucoma Neg Hx     Hypertension Neg Hx     Macular degeneration Neg Hx     Retinal detachment Neg Hx     Strabismus Neg Hx     Stroke Neg Hx     Thyroid disease Neg Hx     Melanoma Neg Hx     Psoriasis Neg Hx     Lupus Neg Hx     Eczema Neg Hx     Acne Neg Hx     Asthma Neg Hx     Emphysema Neg Hx        Review of patient's allergies indicates:   Allergen Reactions    Novocain [procaine] Shortness Of Breath               Current Outpatient Medications:     aspirin (ECOTRIN) 81 MG EC tablet, Take 81 mg by mouth once daily., Disp: , Rfl:     atorvastatin (LIPITOR) 20 MG tablet, Take 1 tablet (20 mg total) by mouth once daily., Disp: 90 tablet, Rfl: 3    b complex vitamins tablet, Take 1 tablet by mouth once daily., Disp: , Rfl:     metoprolol succinate (TOPROL-XL) 25 MG 24 hr tablet, Take 1 tablet (25 mg total) by mouth once daily., Disp: 90 tablet, Rfl: 3    omega-3 fatty acids-vitamin E 1,000 mg Cap, Take by mouth., Disp: , Rfl:     acetaminophen (TYLENOL ORAL), Take by mouth., Disp: , Rfl:     ibuprofen (ADVIL,MOTRIN) 200 MG tablet, Take 200 mg by mouth every 6 (six) hours as needed for Pain., Disp: , Rfl:     Review of Systems:  Constitutional: no fever or chills  Eyes: no visual changes  ENT: no nasal congestion or sore throat  Respiratory: no cough or shortness of breath  Cardiovascular: no chest pain or palpitations  Gastrointestinal: no nausea or vomiting, tolerating diet  Genitourinary: no hematuria or dysuria  Integument/Breast: no rash or pruritis  Hematologic/Lymphatic: no easy bruising or lymphadenopathy  Musculoskeletal: positive for knee pain  Neurological: no seizures or tremors  Behavioral/Psych: no auditory or visual  "hallucinations  Endocrine: no heat or cold intolerance    PE:  Ht 5' 7" (1.702 m)   Wt 90 kg (198 lb 6.4 oz)   BMI 31.07 kg/m²   General: Pleasant, cooperative, NAD   Gait: antalgic  HEENT: NCAT, sclera nonicteric   Lungs: Respirations clear bilaterally; equal and unlabored.   CV: S1S2; 2+ bilateral upper and lower extremity pulses.   Skin: Intact throughout with no rashes, erythema, or lesions  Extremities: No LE edema,  no erythema or warmth of the skin in either lower extremity.    Left knee exam:  Knee Range of Motion:5-115 active, pain with passive range of motion  Effusion:none  Condition of skin:intact  Location of tenderness:Medial joint line   Strength:5 of 5 quadriceps strength and 5 of 5 hamstring strength  Stability: stable to testing    Hip Examination: painless PROM of hip     Radiographs: Radiographs reveal advanced degenerative changes including subchondral cyst formation, subchondral sclerosis, osteophyte formation, joint space narrowing.     Knee Alignment:  Moderate varus    Diagnosis: Primary osteoarthritis Left knee    Plan: Left total knee arthroplasty    Due to the serious nature of total joint infection and the high prevalence of community acquired MRSA, vancomycin will be used perioperatively.            "

## 2022-06-14 NOTE — ASSESSMENT & PLAN NOTE
Encouraged weight loss, healthy diet (DASH/Mediterranean) and exercise. Patient should exercise 30 minutes at least five times weekly.   Treated with: atorvastatin

## 2022-06-14 NOTE — PROGRESS NOTES
Herber Camarillo is a 81 y.o. year old here today for pre surgery optimization visit  in preparation for a Left total knee arthroplasty to be performed by Dr. Nguyen  on 6/20/2022.  he was last seen and treated in the clinic on 1/5/2022. he will be medically optimized by the pre op center. There has been no significant change in medical status since last visit. No fever, chills, malaise, or unexplained weight change.      Allergies, Medications, past medical and surgical history reviewed.    Focused examination performed.    Patient declined to see surgeon today. All questions answered. Patient encouraged to call with questions. Contact information given.     Pre, mauricio, and post operative procedures and expectations discussed. Goals of successful surgery reviewed and include manageable pain levels, surgical site free of infection, medication management, and ambulation with PT/OT assistance. Healthy weight management discussed with patient and caregiver who were receptive to eduction of healthy diet and activity. No other necessary lifestyle changes identified. Educated patient about signs and symptoms of infection, medication management, anticoagulation therapy, risk of tobacco and alcohol use, and self-care to promote healing. Surgical guide given for future reference. Hibiclens given to patient with instructions. All questions were answered.     Herber Camarillo verbalized an understanding to the education and goals. Patient has displayed readiness to engage in care and is ready to proceed with surgery.  Patient reports his wife is able and ready to provide assistance at home after discharge.    Surgical and blood consents signed.    Herber Camarillo will contact us if there are any questions, concerns, or changes in medical status prior to surgery.       Joint class: 5/2    COVID-19 test date: vaccinated      Patient has discussed discharge planning with surgeon. Patient will be discharged to home following surgery.   patient  will be scheduled with [facility]. [place] 7/6    30 minutes of time was spent on patient education, review of records, templating, H&P, , appointment scheduling and optimizing patient for surgery.

## 2022-06-14 NOTE — ASSESSMENT & PLAN NOTE
No longer taking allopurinol for the past 2 weeks- was  taking for renal stones.  Monitor for renal stones

## 2022-06-14 NOTE — OUTPATIENT SUBJECTIVE & OBJECTIVE
Outpatient Subjective & Objective      Chief Complaint: Preoperative evaulation, perioperative medical management, and complication reduction plan.     Functional Capacity: No regular exercise regimen. Active with gardening; walked from parking garage to POC without CP/SOB.       Anesthesia issues: None    Difficulty mouth opening: No    Steroid use in the last 12 months:  Medrol Dose 12/2021    Dental Issues: top and bottom dentures     Family anesthesia difficulty: None       Family Hx of Thrombosis: None    Past Medical History:   Diagnosis Date    Arthritis     CAD (coronary artery disease) 10/30/2012    Cataract     Dermatochalasis of both upper eyelids     Fever blister     Glucose intolerance (impaired glucose tolerance) 7/24/2013    Heart attack     Hyperlipidemia 10/30/2012    Joint pain     Keloid cicatrix     Lung nodule, solitary 4/30/2015    Non morbid obesity due to excess calories 10/20/2015    Obesity     Overweight(278.02) 9/5/2013    Prostate cancer     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         Past Medical History Pertinent Negatives:   Diagnosis Date Noted    Alcohol dependence 10/22/2014    Alzheimer's dementia 10/22/2014    Amblyopia 03/06/2013    Anemia 10/22/2014    Angina pectoris 10/22/2014    Anxiety 10/22/2014    Aphasia as late effect of stroke 10/22/2014    Asthma 10/22/2014    Atrial fibrillation 10/22/2014    Back pain 10/22/2014    CHF (congestive heart failure) 10/22/2014    COPD (chronic obstructive pulmonary disease) 10/22/2014    Deep vein thrombosis 06/14/2022    Depression 10/22/2014    Diabetes mellitus 03/06/2013    Diabetes mellitus 03/27/2014    Diabetes mellitus, type 2 06/14/2022    Diabetic retinopathy 03/06/2013    Diabetic retinopathy 03/27/2014    Disorder of kidney and ureter 06/14/2022    Dysphagia as late effect of stroke 10/22/2014    Fibromyalgia 10/22/2014    GERD (gastroesophageal reflux disease) 06/14/2022    Glaucoma  03/06/2013    Glaucoma 03/27/2014    Hemiparesis affecting dominant side as late effect of cerebrovascular accident 10/22/2014    Hemiparesis affecting nondominant side as late effect of stroke 10/22/2014    Hemiplegia affecting dominant side, 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/2014    Hemiplegia affecting non-dominant side, 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/2014    Hepatitis B 10/22/2014    Hepatitis C 10/22/2014    HIV infection 10/22/2014    Hypertension 03/06/2013    Hypothyroidism 10/22/2014    Macular degeneration 03/06/2013    Monoplegia, lower extremity, 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/2014    Monoplegia, upper extremity, 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/2014    Multiple sclerosis 10/22/2014    Osteoporosis 10/22/2014    Parkinson disease 10/22/2014    Pneumonia 10/22/2014    Polyneuropathy in diabetes(357.2) 10/22/2014    Retinal detachment 03/06/2013    Rheumatoid arthritis(714.0) 10/22/2014    Seizures 10/22/2014    Sleep apnea 10/22/2014    Strabismus 03/06/2013    Stroke 06/14/2022    Thyroid disease 06/14/2022    Tobacco dependence 10/22/2014    Trouble in sleeping 10/22/2014    Type II or unspecified type diabetes mellitus with peripheral circulatory disorders, not stated as uncontrolled(250.70) 10/22/2014    Type II or unspecified type diabetes mellitus with renal manifestations, not stated as uncontrolled(250.40) 10/22/2014    Type II or unspecified type diabetes mellitus without mention of complication, not stated as uncontrolled 10/22/2014    Urinary incontinence 10/22/2014    Uveitis 03/06/2013         Past Surgical History:   Procedure Laterality Date    CARDIAC CATHETERIZATION  01/2000    CATARACT EXTRACTION W/  INTRAOCULAR LENS IMPLANT Left 9/28/2020    Procedure: EXTRACTION, CATARACT, WITH IOL INSERTION;  Surgeon: Nam Morin MD;  Location: [facility];  Service: Ophthalmology;  Laterality: Left;    CATARACT EXTRACTION W/  INTRAOCULAR LENS IMPLANT Right 10/12/2020    Procedure: EXTRACTION, CATARACT, WITH IOL INSERTION;   "Surgeon: Nam Morin MD;  Location: [facility];  Service: Ophthalmology;  Laterality: Right;  laser    CHOLECYSTECTOMY      HERNIA REPAIR      HIP SURGERY Right 01/11/2017    THR    PROSTATE SURGERY      SHOULDER OPEN ROTATOR CUFF REPAIR      TONSILLECTOMY         Review of Systems   Constitutional: Negative for chills, fatigue, fever and unexpected weight change.   HENT: Positive for hearing loss (no hearing aids). Negative for dental problem, postnasal drip, rhinorrhea, sore throat, tinnitus and trouble swallowing.    Eyes: Negative for photophobia, pain, discharge and visual disturbance.   Respiratory: Negative for apnea, cough, chest tightness, shortness of breath and wheezing.    Cardiovascular: Negative for chest pain, palpitations and leg swelling.   Gastrointestinal: Negative for abdominal pain, blood in stool, constipation, nausea and vomiting.        Denies Fatty liver, Hepatitis   Endocrine: Negative for cold intolerance and heat intolerance.   Genitourinary: Negative for decreased urine volume, difficulty urinating, dysuria, frequency, hematuria and urgency.   Musculoskeletal: Negative for arthralgias, back pain, neck pain and neck stiffness.   Skin: Positive for rash (Psoriasis). Negative for wound.   Allergic/Immunologic: Negative for environmental allergies.   Neurological: Positive for dizziness (occasional with positional changes) and numbness (bilateral hand- after sleeping). Negative for tremors, seizures, syncope, weakness and headaches.   Hematological: Negative for adenopathy. Bruises/bleeds easily.   Psychiatric/Behavioral: Negative for confusion, sleep disturbance and suicidal ideas.              VITALS  Visit Vitals  BP (!) 146/71 (BP Location: Left arm, Patient Position: Sitting)   Pulse (!) 53   Temp 97.8 °F (36.6 °C) (Oral)   Ht 5' 7" (1.702 m)   Wt 92.1 kg (203 lb)   SpO2 97%   BMI 31.79 kg/m²          Physical Exam  Vitals reviewed.   Constitutional:       General: He is not in " acute distress.     Appearance: He is well-developed. He is obese.   HENT:      Head: Normocephalic.      Nose: Nose normal.      Mouth/Throat:      Pharynx: No oropharyngeal exudate.   Eyes:      General:         Right eye: No discharge.         Left eye: No discharge.      Conjunctiva/sclera: Conjunctivae normal.      Pupils: Pupils are equal, round, and reactive to light.   Neck:      Thyroid: No thyromegaly.      Vascular: No carotid bruit or JVD.      Trachea: No tracheal deviation.   Cardiovascular:      Rate and Rhythm: Regular rhythm. Bradycardia present.      Pulses:           Carotid pulses are 2+ on the right side and 2+ on the left side.       Dorsalis pedis pulses are 2+ on the right side and 2+ on the left side.        Posterior tibial pulses are 2+ on the right side and 2+ on the left side.      Heart sounds: Normal heart sounds. No murmur heard.  Pulmonary:      Effort: Pulmonary effort is normal. No respiratory distress.      Breath sounds: Normal breath sounds. No stridor. No wheezing, rhonchi or rales.   Abdominal:      General: Bowel sounds are normal. There is no distension.      Palpations: Abdomen is soft.      Tenderness: There is no abdominal tenderness. There is no guarding.   Musculoskeletal:      Cervical back: Normal range of motion.      Right lower le+ Pitting Edema present.      Left lower le+ Pitting Edema present.   Lymphadenopathy:      Cervical: No cervical adenopathy.   Skin:     General: Skin is warm and dry.      Capillary Refill: Capillary refill takes less than 2 seconds.      Findings: No erythema or rash.   Neurological:      Mental Status: He is alert and oriented to person, place, and time.      Coordination: Coordination normal.          Significant Labs:  Lab Results   Component Value Date    WBC 7.29 2022    HGB 17.1 2022    HCT 51.3 2022     2022    CHOL 127 10/20/2021    TRIG 110 10/20/2021    HDL 40 10/20/2021    ALT 29  03/31/2021    AST 26 03/31/2021     06/14/2022    K 4.9 06/14/2022     06/14/2022    CREATININE 0.9 06/14/2022    BUN 11 06/14/2022    CO2 25 06/14/2022    TSH 1.987 12/16/2016    PSA 0.03 03/31/2021    INR 1.0 06/14/2022    HGBA1C 4.9 03/31/2021       Diagnostic Studies: No relevant studies.    EKG:   Results for orders placed or performed during the hospital encounter of 06/14/22   EKG 12-lead    Collection Time: 06/14/22 11:40 AM    Narrative    Test Reason : Z01.818,    Vent. Rate : 056 BPM     Atrial Rate : 056 BPM     P-R Int : 174 ms          QRS Dur : 070 ms      QT Int : 430 ms       P-R-T Axes : 047 037 030 degrees     QTc Int : 414 ms    Sinus bradycardia  Otherwise normal ECG  No previous ECGs available  Confirmed by DAYA ÁLVAREZ, LIZ ([id number]) on 6/14/2022 12:49:13 PM    Referred By: GABRIEL MENON           Confirmed By:LIZ STAPLETON MD       DSE: OLD 2013    CONCLUSIONS     1 - Normal left ventricular function (EF 65%).     2 - Normal diastolic function.     3 - Moderate left atrial enlargement.     4 - Normal right ventricular function .     No evidence of stress induced myocardial ischemia.     This document has been electronically    SIGNED BY: Mahesh Travis M.D. On: 05/16/2013 13:32       BOOM:  No results found for this or any previous visit.     Imaging   CT chest 2/13/19    IMPRESSION:      1. No acute cardiopulmonary disease.  2. Stable right upper lobe nodule, probably benign with no further follow-up recommended.  3. Aortic and coronary atherosclerosis.  4. Status post cholecystectomy and other findings as above.        Electronically signed by: Manohar Marino MD  Date:                                            02/13/2019  Time:                                           14:48        Xray L-Spine 1/15/19  FINDINGS:  Bones are slightly demineralized.  Anterior osteophytes noted.  No compression fracture.  Facet arthropathy lower lumbar levels.  Vascular calcifications in the aorta.   Postop right hip replacement.     IMPRESSION:      Degenerative change.        Electronically signed by: Seth Norton MD  Date:                                            01/15/2019  Time:                                           11:50          Xray C-Spine 4/20/15  Cervical spine AP lateral.       There is significant degenerative change beginning at C5 through C6-7 interspace narrowing and osteophyte forming.  There is some mild facet hypertrophy and uncinate spurring.  The odontoid is intact where visualized.  IMPRESSION:    As above.        Electronically signed by: Noman Fong MD  Date:                                            04/20/15  Time:                                           15:07           Active Cardiac Conditions: None      Revised Cardiac Risk Index   High -Risk Surgery  Intraperitoneal; Intrathoracic; suprainguinal vascular Yes- + 1 No- 0   History of Ischemic Heart Disease   (Hx of MI/positive exercise test/current chest pain due to ischemia/use of nitrate therapy/EKG with pathological Q waves) Yes- + 1 No- 0   History of CHF  (Pulmonary edema/bilateral rales or S3 gallop/PND/CXR showing pulmonary vascular redistribution) Yes- + 1 No- 0   History of CVA   (Prior stroke or TIA) Yes- + 1 No- 0   Pre-operative treatment with insulin Yes- + 1 No- 0   Pre-operative creatinine > 2mg/dl Yes- + 1 No- 0   Total: 1      Risk Status:  Estimated risk of cardiac complications after non-cardiac surgery using the Revised Cardiac Risk Index for Preoperative risk is 6.0 %      ARISCAT (Canet) risk index: Intermediate: 13.3% risk of post-op pulmonary complications.    American Society of Anesthesiologists Physical Status classification (ASA): 3               No further cardiac workup needed prior to surgery.    Outpatient Subjective & Objective

## 2022-06-14 NOTE — ASSESSMENT & PLAN NOTE
History of MI (NSTEMI); one stent to LAD in 2000.   Treated with: metoprolol 25 mg daily/ASA 81 mg   Denies: CP/SOB/PND/Orthopnea/Palpitations/Syncope    Encouraged physical activity of at least 30 minutes of moderate aerobic activity 5 days weekly.

## 2022-06-14 NOTE — HPI
This is a 81 y.o. male  who presents today with daughter for a preoperative evaluation in preparation for an Orthopedic  procedure.  Scheduled for  left knee arthroplasty.   Surgery is indicated for osteoarthritis of left knee.   Patient is new to me.  Details of current problem: The duration of problem is one year.  Reports bilateral knee, L>R. Patient denies trauma and states pain is becoming progressively worse.    Reports symptoms of  intermittent throbbing pain to left knee.   Aggravating factors include:  walking, standing and bending over   Relieving factors are rest/ice/Aleve/Ibuprofen prn.  Denies pain today; uses walking stick for assistance.   The history has been obtained by speaking with the patient and reviewing the electronic medical record and/or outside health information. Significant health conditions for the perioperative period are discussed below in assessment and plan.     Patient reports current health status to be Good.  Denies any new symptoms before surgery.

## 2022-06-14 NOTE — PROGRESS NOTES
[facility]  Progress Note    Patient Name: Herber Camarillo  MRN: [id number]  Date of Evaluation- 06/16/2022  PCP- Fidelina Loyola MD    Future cases for Herber Camarillo [[id number]]     Case ID Status Date Time [place] Procedure Provider Location    [id number] [facility] 6/20/2022  9:40  ARTHROPLASTY, KNEE/ KAVON NEXGEN/ CEMENTED TIBIA Rodolfo Nguyen MD [[id number]] [facility]          HPI:  This is a 81 y.o. male  who presents today with daughter for a preoperative evaluation in preparation for an Orthopedic  procedure.  Scheduled for  left knee arthroplasty.   Surgery is indicated for osteoarthritis of left knee.   Patient is new to me.  Details of current problem: The duration of problem is one year.  Reports bilateral knee, L>R. Patient denies trauma and states pain is becoming progressively worse.    Reports symptoms of  intermittent throbbing pain to left knee.   Aggravating factors include:  walking, standing and bending over   Relieving factors are rest/ice/Aleve/Ibuprofen prn.  Denies pain today; uses walking stick for assistance.   The history has been obtained by speaking with the patient and reviewing the electronic medical record and/or outside health information. Significant health conditions for the perioperative period are discussed below in assessment and plan.     Patient reports current health status to be Good.  Denies any new symptoms before surgery.          Subjective/ Objective:     Chief Complaint: Preoperative evaulation, perioperative medical management, and complication reduction plan.     Functional Capacity: No regular exercise regimen. Active with gardening; walked from parking garage to POC without CP/SOB.       Anesthesia issues: None    Difficulty mouth opening: No    Steroid use in the last 12 months:  Medrol Dose 12/2021    Dental Issues: top and bottom dentures     Family anesthesia difficulty: None       Family Hx of Thrombosis: None    Past Medical History:   Diagnosis Date     Arthritis     CAD (coronary artery disease) 10/30/2012    Cataract     Dermatochalasis of both upper eyelids     Fever blister     Glucose intolerance (impaired glucose tolerance) 7/24/2013    Heart attack     Hyperlipidemia 10/30/2012    Joint pain     Keloid cicatrix     Lung nodule, solitary 4/30/2015    Non morbid obesity due to excess calories 10/20/2015    Obesity     Overweight(278.02) 9/5/2013    Prostate cancer     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         Past Medical History Pertinent Negatives:   Diagnosis Date Noted    Alcohol dependence 10/22/2014    Alzheimer's dementia 10/22/2014    Amblyopia 03/06/2013    Anemia 10/22/2014    Angina pectoris 10/22/2014    Anxiety 10/22/2014    Aphasia as late effect of stroke 10/22/2014    Asthma 10/22/2014    Atrial fibrillation 10/22/2014    Back pain 10/22/2014    CHF (congestive heart failure) 10/22/2014    COPD (chronic obstructive pulmonary disease) 10/22/2014    Deep vein thrombosis 06/14/2022    Depression 10/22/2014    Diabetes mellitus 03/06/2013    Diabetes mellitus 03/27/2014    Diabetes mellitus, type 2 06/14/2022    Diabetic retinopathy 03/06/2013    Diabetic retinopathy 03/27/2014    Disorder of kidney and ureter 06/14/2022    Dysphagia as late effect of stroke 10/22/2014    Fibromyalgia 10/22/2014    GERD (gastroesophageal reflux disease) 06/14/2022    Glaucoma 03/06/2013    Glaucoma 03/27/2014    Hemiparesis affecting dominant side as late effect of cerebrovascular accident 10/22/2014    Hemiparesis affecting nondominant side as late effect of stroke 10/22/2014    Hemiplegia affecting dominant side, 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/2014    Hemiplegia affecting non-dominant side, 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/2014    Hepatitis B 10/22/2014    Hepatitis C 10/22/2014    HIV infection 10/22/2014    Hypertension 03/06/2013    Hypothyroidism 10/22/2014    Macular degeneration 03/06/2013    Monoplegia, lower extremity, post-stroke  10/22/2014    Monoplegia, upper extremity, 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/2014    Multiple sclerosis 10/22/2014    Osteoporosis 10/22/2014    Parkinson disease 10/22/2014    Pneumonia 10/22/2014    Polyneuropathy in diabetes(357.2) 10/22/2014    Retinal detachment 03/06/2013    Rheumatoid arthritis(714.0) 10/22/2014    Seizures 10/22/2014    Sleep apnea 10/22/2014    Strabismus 03/06/2013    Stroke 06/14/2022    Thyroid disease 06/14/2022    Tobacco dependence 10/22/2014    Trouble in sleeping 10/22/2014    Type II or unspecified type diabetes mellitus with peripheral circulatory disorders, not stated as uncontrolled(250.70) 10/22/2014    Type II or unspecified type diabetes mellitus with renal manifestations, not stated as uncontrolled(250.40) 10/22/2014    Type II or unspecified type diabetes mellitus without mention of complication, not stated as uncontrolled 10/22/2014    Urinary incontinence 10/22/2014    Uveitis 03/06/2013         Past Surgical History:   Procedure Laterality Date    CARDIAC CATHETERIZATION  01/2000    CATARACT EXTRACTION W/  INTRAOCULAR LENS IMPLANT Left 9/28/2020    Procedure: EXTRACTION, CATARACT, WITH IOL INSERTION;  Surgeon: Nam Morin MD;  Location: [facility];  Service: Ophthalmology;  Laterality: Left;    CATARACT EXTRACTION W/  INTRAOCULAR LENS IMPLANT Right 10/12/2020    Procedure: EXTRACTION, CATARACT, WITH IOL INSERTION;  Surgeon: Nam Morin MD;  Location: [facility];  Service: Ophthalmology;  Laterality: Right;  laser    CHOLECYSTECTOMY      HERNIA REPAIR      HIP SURGERY Right 01/11/2017    THR    PROSTATE SURGERY      SHOULDER OPEN ROTATOR CUFF REPAIR      TONSILLECTOMY         Review of Systems   Constitutional: Negative for chills, fatigue, fever and unexpected weight change.   HENT: Positive for hearing loss (no hearing aids). Negative for dental problem, postnasal drip, rhinorrhea, sore throat, tinnitus and trouble swallowing.    Eyes: Negative for  "photophobia, pain, discharge and visual disturbance.   Respiratory: Negative for apnea, cough, chest tightness, shortness of breath and wheezing.    Cardiovascular: Negative for chest pain, palpitations and leg swelling.   Gastrointestinal: Negative for abdominal pain, blood in stool, constipation, nausea and vomiting.        Denies Fatty liver, Hepatitis   Endocrine: Negative for cold intolerance and heat intolerance.   Genitourinary: Negative for decreased urine volume, difficulty urinating, dysuria, frequency, hematuria and urgency.   Musculoskeletal: Negative for arthralgias, back pain, neck pain and neck stiffness.   Skin: Positive for rash (Psoriasis). Negative for wound.   Allergic/Immunologic: Negative for environmental allergies.   Neurological: Positive for dizziness (occasional with positional changes) and numbness (bilateral hand- after sleeping). Negative for tremors, seizures, syncope, weakness and headaches.   Hematological: Negative for adenopathy. Bruises/bleeds easily.   Psychiatric/Behavioral: Negative for confusion, sleep disturbance and suicidal ideas.              VITALS  Visit Vitals  BP (!) 146/71 (BP Location: Left arm, Patient Position: Sitting)   Pulse (!) 53   Temp 97.8 °F (36.6 °C) (Oral)   Ht 5' 7" (1.702 m)   Wt 92.1 kg (203 lb)   SpO2 97%   BMI 31.79 kg/m²          Physical Exam  Vitals reviewed.   Constitutional:       General: He is not in acute distress.     Appearance: He is well-developed. He is obese.   HENT:      Head: Normocephalic.      Nose: Nose normal.      Mouth/Throat:      Pharynx: No oropharyngeal exudate.   Eyes:      General:         Right eye: No discharge.         Left eye: No discharge.      Conjunctiva/sclera: Conjunctivae normal.      Pupils: Pupils are equal, round, and reactive to light.   Neck:      Thyroid: No thyromegaly.      Vascular: No carotid bruit or JVD.      Trachea: No tracheal deviation.   Cardiovascular:      Rate and Rhythm: Regular rhythm. " Bradycardia present.      Pulses:           Carotid pulses are 2+ on the right side and 2+ on the left side.       Dorsalis pedis pulses are 2+ on the right side and 2+ on the left side.        Posterior tibial pulses are 2+ on the right side and 2+ on the left side.      Heart sounds: Normal heart sounds. No murmur heard.  Pulmonary:      Effort: Pulmonary effort is normal. No respiratory distress.      Breath sounds: Normal breath sounds. No stridor. No wheezing, rhonchi or rales.   Abdominal:      General: Bowel sounds are normal. There is no distension.      Palpations: Abdomen is soft.      Tenderness: There is no abdominal tenderness. There is no guarding.   Musculoskeletal:      Cervical back: Normal range of motion.      Right lower le+ Pitting Edema present.      Left lower le+ Pitting Edema present.   Lymphadenopathy:      Cervical: No cervical adenopathy.   Skin:     General: Skin is warm and dry.      Capillary Refill: Capillary refill takes less than 2 seconds.      Findings: No erythema or rash.   Neurological:      Mental Status: He is alert and oriented to person, place, and time.      Coordination: Coordination normal.          Significant Labs:  Lab Results   Component Value Date    WBC 7.29 2022    HGB 17.1 2022    HCT 51.3 2022     2022    CHOL 127 10/20/2021    TRIG 110 10/20/2021    HDL 40 10/20/2021    ALT 29 2021    AST 26 2021     2022    K 4.9 2022     2022    CREATININE 0.9 2022    BUN 11 2022    CO2 25 2022    TSH 1.987 2016    PSA 0.03 2021    INR 1.0 2022    HGBA1C 4.9 2021       Diagnostic Studies: No relevant studies.    EKG:   Results for orders placed or performed during the hospital encounter of 22   EKG 12-lead    Collection Time: 22 11:40 AM    Narrative    Test Reason : Z01.818,    Vent. Rate : 056 BPM     Atrial Rate : 056 BPM     P-R Int : 174  ms          QRS Dur : 070 ms      QT Int : 430 ms       P-R-T Axes : 047 037 030 degrees     QTc Int : 414 ms    Sinus bradycardia  Otherwise normal ECG  No previous ECGs available  Confirmed by LIZ STAPLETON MD ([id number]) on 6/14/2022 12:49:13 PM    Referred By: GABRIEL MENON           Confirmed By:LIZ STAPLETON MD       DSE: OLD 2013    CONCLUSIONS     1 - Normal left ventricular function (EF 65%).     2 - Normal diastolic function.     3 - Moderate left atrial enlargement.     4 - Normal right ventricular function .     No evidence of stress induced myocardial ischemia.     This document has been electronically    SIGNED BY: Mahesh Travis M.D. On: 05/16/2013 13:32       BOOM:  No results found for this or any previous visit.     Imaging   CT chest 2/13/19    IMPRESSION:      1. No acute cardiopulmonary disease.  2. Stable right upper lobe nodule, probably benign with no further follow-up recommended.  3. Aortic and coronary atherosclerosis.  4. Status post cholecystectomy and other findings as above.        Electronically signed by: Manohar Marino MD  Date:                                            02/13/2019  Time:                                           14:48        Xray L-Spine 1/15/19  FINDINGS:  Bones are slightly demineralized.  Anterior osteophytes noted.  No compression fracture.  Facet arthropathy lower lumbar levels.  Vascular calcifications in the aorta.  Postop right hip replacement.     IMPRESSION:      Degenerative change.        Electronically signed by: Seth Norton MD  Date:                                            01/15/2019  Time:                                           11:50          Xray C-Spine 4/20/15  Cervical spine AP lateral.       There is significant degenerative change beginning at C5 through C6-7 interspace narrowing and osteophyte forming.  There is some mild facet hypertrophy and uncinate spurring.  The odontoid is intact where visualized.  IMPRESSION:    As above.         Electronically signed by: Noman Fong MD  Date:                                            04/20/15  Time:                                           15:07           Active Cardiac Conditions: None      Revised Cardiac Risk Index   High -Risk Surgery  Intraperitoneal; Intrathoracic; suprainguinal vascular Yes- + 1 No- 0   History of Ischemic Heart Disease   (Hx of MI/positive exercise test/current chest pain due to ischemia/use of nitrate therapy/EKG with pathological Q waves) Yes- + 1 No- 0   History of CHF  (Pulmonary edema/bilateral rales or S3 gallop/PND/CXR showing pulmonary vascular redistribution) Yes- + 1 No- 0   History of CVA   (Prior stroke or TIA) Yes- + 1 No- 0   Pre-operative treatment with insulin Yes- + 1 No- 0   Pre-operative creatinine > 2mg/dl Yes- + 1 No- 0   Total: 1      Risk Status:  Estimated risk of cardiac complications after non-cardiac surgery using the Revised Cardiac Risk Index for Preoperative risk is 6.0 %      ARISCAT (Canet) risk index: Intermediate: 13.3% risk of post-op pulmonary complications.    American Society of Anesthesiologists Physical Status classification (ASA): 3               No further cardiac workup needed prior to surgery.                   Assessment/Plan:     Lung nodule, solitary .7 cm 7/15; stable 12/16, also 2/19  Stable for many years; denies CP/SOB/cough/congestion/wheezing    Hyperlipidemia  Encouraged weight loss, healthy diet (DASH/Mediterranean) and exercise. Patient should exercise 30 minutes at least five times weekly.   Treated with: atorvastatin    Non morbid obesity due to excess calories  Patient would benefit from weight loss and has not set goals to achieve success. Lifestyle changes should be made by eating healthy, exercising at least 150 minutes weekly, and avoiding sedentary behavior.       Prostate cancer: discharged from the urology clinic in 2011.  LUTS:  Surgery: S/P Prostatectomy > 20 years ago.     Uric acid crystallopathy  No longer  taking allopurinol for the past 2 weeks- was  taking for renal stones.  Monitor for renal stones    Primary osteoarthritis of left knee  Scheduled with Dr. Nguyen on 6/20/22 for left knee arthroplasty.     Coronary artery disease involving native coronary artery of native heart without angina pectoris  History of MI (NSTEMI); one stent to LAD in 2000.   Treated with: metoprolol 25 mg daily/ASA 81 mg   Denies: CP/SOB/PND/Orthopnea/Palpitations/Syncope    Encouraged physical activity of at least 30 minutes of moderate aerobic activity 5 days weekly.            Discussion/Management of Perioperative Care    Thromboembolic prophylaxis (VTE) Care: Risk factors for thrombosis include: age, obesity and surgical procedure.  I recommend prophylaxis of thromboembolism with the use of compression stockings/pneumatic devices, and/or pharmacologic agents. The benefits should outweigh the risks for pharmacologic prophylaxis in the perioperative period. I also encourage early ambulation if not contraindicated during the post-operative period.    Risk factors for post-operative pulmonary complications include:age > 65 years. To reduce the risk of pulmonary complications, prophylactic recommendations include: incentive spirometry use/deep breathing, early ambulation and pain control.    Risk factors for renal complications include: age. To reduce the risk of postoperative renal complications, I recommend the patient maintain adequate fluid volume status by drinking 2 liters of water daily.  Avoid/reduce NSAIDS and KAMINSKI-2 inhibitors use as well as IV contrast for renal protection.    I recommend the use of appropriate prophylactic antibiotics to reduce the risk of surgical site infections.    Delirium risk factors include advanced age. I recommend to avoid/reduce use of benzodiazepine use (not for patients who take on a regular basis), anticholinergics, Benadryl,  and agents that may cause postoperative serotonin syndrome.  Controlled  pain can decrease the risk for postop delirium and since opioids are used for postoperative pain control, I suggest using the lowest dose for the shortest amount of time necessary for pain management.     The patient is at an increased risk for urinary retention due to : possible regional anesthesia and advanced age. I recommend to avoid/decrease the use of benzodiazepines, anticholinergics, and Benadryl in the perioperative period. I also recommend using opioids for the shortest period of time if possible.          This visit was focused on Preoperative evaluation, Perioperative Medical management, complication reduction plans. I suggest that the patient follows up with primary care or relevant sub specialists for ongoing health care.    I appreciate the opportunity to be involved in this patients care. Please feel free to contact me if there were any questions about this consultation.    Patient is optimized for surgery    6/16/22: Optimized per Cardiology ([facility]) 6/16/22.      Amee Herbert NP  Perioperative Medicine  [facility]

## 2022-06-16 ENCOUNTER — OFFICE VISIT (OUTPATIENT)
Dept: CARDIOLOGY | Facility: CLINIC | Age: 82
End: 2022-06-16
Payer: MEDICARE

## 2022-06-16 DIAGNOSIS — I25.10 CORONARY ARTERY DISEASE INVOLVING NATIVE CORONARY ARTERY OF NATIVE HEART WITHOUT ANGINA PECTORIS: Primary | ICD-10-CM

## 2022-06-16 DIAGNOSIS — C61 PROSTATE CANCER: ICD-10-CM

## 2022-06-16 DIAGNOSIS — E78.2 MIXED HYPERLIPIDEMIA: ICD-10-CM

## 2022-06-16 DIAGNOSIS — R91.1 LUNG NODULE, SOLITARY: ICD-10-CM

## 2022-06-16 DIAGNOSIS — I25.10 ASCVD (ARTERIOSCLEROTIC CARDIOVASCULAR DISEASE): ICD-10-CM

## 2022-06-16 DIAGNOSIS — I70.0 ATHEROSCLEROSIS OF AORTA: ICD-10-CM

## 2022-06-16 DIAGNOSIS — I51.89 DIASTOLIC DYSFUNCTION: ICD-10-CM

## 2022-06-16 PROBLEM — H25.13 NUCLEAR SCLEROSIS, BILATERAL: Status: RESOLVED | Noted: 2020-09-28 | Resolved: 2022-06-16

## 2022-06-16 PROCEDURE — 99213 OFFICE O/P EST LOW 20 MIN: CPT | Mod: 95,,, | Performed by: INTERNAL MEDICINE

## 2022-06-16 PROCEDURE — 99213 PR OFFICE/OUTPT VISIT, EST, LEVL III, 20-29 MIN: ICD-10-PCS | Mod: 95,,, | Performed by: INTERNAL MEDICINE

## 2022-06-16 NOTE — PROGRESS NOTES
Subjective:    Patient ID:  Herber Camarillo is a 81 y.o. male who presents for follow-up of CAD    HPI     The patient is a 81 year male followed  post NSTEMI [?PCI to LAD] 2000 presents via virtual visit for pre-op left TKR. He has been having knee pain limiting mobility of over a year.He denies chest pain or WILKINS. EKG yesterday was normal. Lab was reviewed and satisfactory.  Lab Results   Component Value Date     06/14/2022    K 4.9 06/14/2022     06/14/2022    CO2 25 06/14/2022    BUN 11 06/14/2022    CREATININE 0.9 06/14/2022    GLU 91 06/14/2022    HGBA1C 4.9 03/31/2021    AST 26 03/31/2021    ALT 29 03/31/2021    ALBUMIN 3.6 03/31/2021    PROT 6.6 03/31/2021    BILITOT 1.0 03/31/2021    WBC 7.29 06/14/2022    HGB 17.1 06/14/2022    HCT 51.3 06/14/2022    MCV 94 06/14/2022     06/14/2022    INR 1.0 06/14/2022    PSA 0.03 03/31/2021    TSH 1.987 12/16/2016         Lab Results   Component Value Date    CHOL 127 10/20/2021    HDL 40 10/20/2021    TRIG 110 10/20/2021       Lab Results   Component Value Date    LDLCALC 65.0 10/20/2021       Past Medical History:   Diagnosis Date    Arthritis     CAD (coronary artery disease) 10/30/2012    Cataract     Dermatochalasis of both upper eyelids     Fever blister     Glucose intolerance (impaired glucose tolerance) 7/24/2013    Heart attack     Hyperlipidemia 10/30/2012    Joint pain     Keloid cicatrix     Lung nodule, solitary 4/30/2015    Non morbid obesity due to excess calories 10/20/2015    Obesity     Overweight(278.02) 9/5/2013    Prostate cancer     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       Current Outpatient Medications:     acetaminophen (TYLENOL ORAL), Take by mouth., Disp: , Rfl:     aspirin (ECOTRIN) 81 MG EC tablet, Take 81 mg by mouth once daily., Disp: , Rfl:     atorvastatin (LIPITOR) 20 MG tablet, Take 1 tablet (20 mg total) by mouth once daily., Disp: 90 tablet, Rfl: 3    b complex vitamins tablet, Take 1 tablet by  mouth once daily., Disp: , Rfl:     ibuprofen (ADVIL,MOTRIN) 200 MG tablet, Take 200 mg by mouth every 6 (six) hours as needed for Pain., Disp: , Rfl:     metoprolol succinate (TOPROL-XL) 25 MG 24 hr tablet, Take 1 tablet (25 mg total) by mouth once daily., Disp: 90 tablet, Rfl: 3    omega-3 fatty acids-vitamin E 1,000 mg Cap, Take by mouth., Disp: , Rfl:           Review of Systems   Constitutional: Negative for decreased appetite, diaphoresis, fever, malaise/fatigue, weight gain and weight loss.   HENT: Negative for congestion, ear discharge, ear pain and nosebleeds.    Eyes: Negative for blurred vision, double vision and visual disturbance.   Cardiovascular: Negative for chest pain, claudication, cyanosis, dyspnea on exertion, irregular heartbeat, leg swelling, near-syncope, orthopnea, palpitations, paroxysmal nocturnal dyspnea and syncope.   Respiratory: Negative for cough, hemoptysis, shortness of breath, sleep disturbances due to breathing, snoring, sputum production and wheezing.    Endocrine: Negative for polydipsia, polyphagia and polyuria.   Hematologic/Lymphatic: Negative for adenopathy and bleeding problem. Does not bruise/bleed easily.   Skin: Negative for color change, nail changes, poor wound healing and rash.   Musculoskeletal: Positive for joint pain (left knee). Negative for muscle cramps and muscle weakness.   Gastrointestinal: Negative for abdominal pain, anorexia, change in bowel habit, hematochezia, nausea and vomiting.   Genitourinary: Negative for dysuria, frequency and hematuria.   Neurological: Negative for brief paralysis, difficulty with concentration, excessive daytime sleepiness, dizziness, focal weakness, headaches, light-headedness, seizures, vertigo and weakness.   Psychiatric/Behavioral: Negative for altered mental status and depression.   Allergic/Immunologic: Negative for persistent infections.        Objective:There were no vitals taken for this visit. none            Physical  Examnone      Assessment:       1. Coronary artery disease involving native coronary artery of native heart without angina pectoris    2. Diastolic dysfunction    3. Lung nodule, solitary .7 cm 7/15; stable 12/16, also 2/19    4. Prostate cancer: discharged from the urology clinic in 2011.    5. Atherosclerosis of aorta: see CT scan 7/15    6. Mixed hyperlipidemia    7. ASCVD (arteriosclerotic cardiovascular disease)         Plan:       Diagnoses and all orders for this visit:    Coronary artery disease involving native coronary artery of native heart without angina pectoris    Diastolic dysfunction    Lung nodule, solitary .7 cm 7/15; stable 12/16, also 2/19    Prostate cancer: discharged from the urology clinic in 2011.    Atherosclerosis of aorta: see CT scan 7/15    Mixed hyperlipidemia    ASCVD (arteriosclerotic cardiovascular disease)        Low CV risk for planned procedure

## 2022-06-17 ENCOUNTER — ANESTHESIA EVENT (OUTPATIENT)
Dept: SURGERY | Facility: HOSPITAL | Age: 82
End: 2022-06-17
Payer: MEDICARE

## 2022-06-17 ENCOUNTER — TELEPHONE (OUTPATIENT)
Dept: ORTHOPEDICS | Facility: CLINIC | Age: 82
End: 2022-06-17
Payer: MEDICARE

## 2022-06-17 RX ORDER — PREGABALIN 75 MG/1
75 CAPSULE ORAL 2 TIMES DAILY
Qty: 60 CAPSULE | Refills: 2 | Status: SHIPPED | OUTPATIENT
Start: 2022-06-17 | End: 2022-07-21

## 2022-06-17 RX ORDER — OXYCODONE HYDROCHLORIDE 5 MG/1
TABLET ORAL
Qty: 50 TABLET | Refills: 0 | Status: SHIPPED | OUTPATIENT
Start: 2022-06-17 | End: 2022-07-21

## 2022-06-17 RX ORDER — DOCUSATE SODIUM 100 MG/1
100 CAPSULE, LIQUID FILLED ORAL 2 TIMES DAILY
Qty: 60 CAPSULE | Refills: 0 | Status: SHIPPED | OUTPATIENT
Start: 2022-06-17 | End: 2022-07-21

## 2022-06-17 RX ORDER — METHOCARBAMOL 750 MG/1
750 TABLET, FILM COATED ORAL 4 TIMES DAILY PRN
Qty: 40 TABLET | Refills: 0 | Status: SHIPPED | OUTPATIENT
Start: 2022-06-17 | End: 2022-07-01

## 2022-06-17 RX ORDER — ASPIRIN 81 MG/1
81 TABLET ORAL 2 TIMES DAILY
Qty: 60 TABLET | Refills: 0 | Status: SHIPPED | OUTPATIENT
Start: 2022-06-17 | End: 2023-05-18

## 2022-06-17 RX ORDER — DEXTROMETHORPHAN HYDROBROMIDE, GUAIFENESIN 5; 100 MG/5ML; MG/5ML
650 LIQUID ORAL EVERY 8 HOURS
Qty: 120 TABLET | Refills: 0 | Status: SHIPPED | OUTPATIENT
Start: 2022-06-17 | End: 2023-09-07

## 2022-06-17 RX ORDER — MELOXICAM 15 MG/1
15 TABLET ORAL DAILY
Qty: 30 TABLET | Refills: 0 | Status: SHIPPED | OUTPATIENT
Start: 2022-06-17 | End: 2022-07-21

## 2022-06-20 ENCOUNTER — ANESTHESIA (OUTPATIENT)
Dept: SURGERY | Facility: HOSPITAL | Age: 82
End: 2022-06-20
Payer: MEDICARE

## 2022-06-20 ENCOUNTER — HOSPITAL ENCOUNTER (OUTPATIENT)
Facility: HOSPITAL | Age: 82
Discharge: HOME | End: 2022-06-21
Attending: ORTHOPAEDIC SURGERY | Admitting: ORTHOPAEDIC SURGERY
Payer: MEDICARE

## 2022-06-20 DIAGNOSIS — M17.12 PRIMARY OSTEOARTHRITIS OF LEFT KNEE: ICD-10-CM

## 2022-06-20 DIAGNOSIS — M17.11 PRIMARY OSTEOARTHRITIS OF RIGHT KNEE: Primary | ICD-10-CM

## 2022-06-20 PROCEDURE — 63600175 PHARM REV CODE 636 W HCPCS: Performed by: PHYSICIAN ASSISTANT

## 2022-06-20 PROCEDURE — 27201423 OPTIME MED/SURG SUP & DEVICES STERILE SUPPLY: Performed by: ORTHOPAEDIC SURGERY

## 2022-06-20 PROCEDURE — 64448 PR NERVE BLOCK INJ, ANES/STEROID, FEMORAL, CONT INFUSION, INCL IMAG GUIDANCE: ICD-10-PCS | Mod: 59,LT,, | Performed by: ANESTHESIOLOGY

## 2022-06-20 PROCEDURE — 71000039 HC RECOVERY, EACH ADD'L HOUR: Performed by: ORTHOPAEDIC SURGERY

## 2022-06-20 PROCEDURE — 25000003 PHARM REV CODE 250: Performed by: PHYSICIAN ASSISTANT

## 2022-06-20 PROCEDURE — 71000033 HC RECOVERY, INTIAL HOUR: Performed by: ORTHOPAEDIC SURGERY

## 2022-06-20 PROCEDURE — C1776 JOINT DEVICE (IMPLANTABLE): HCPCS | Performed by: ORTHOPAEDIC SURGERY

## 2022-06-20 PROCEDURE — 25000003 PHARM REV CODE 250: Performed by: NURSE ANESTHETIST, CERTIFIED REGISTERED

## 2022-06-20 PROCEDURE — 76942 PR U/S GUIDANCE FOR NEEDLE GUIDANCE: ICD-10-PCS | Mod: 26,,, | Performed by: ANESTHESIOLOGY

## 2022-06-20 PROCEDURE — 97161 PT EVAL LOW COMPLEX 20 MIN: CPT

## 2022-06-20 PROCEDURE — D9220A PRA ANESTHESIA: Mod: ANES,,, | Performed by: ANESTHESIOLOGY

## 2022-06-20 PROCEDURE — 63600175 PHARM REV CODE 636 W HCPCS: Performed by: NURSE ANESTHETIST, CERTIFIED REGISTERED

## 2022-06-20 PROCEDURE — C1713 ANCHOR/SCREW BN/BN,TIS/BN: HCPCS | Performed by: ORTHOPAEDIC SURGERY

## 2022-06-20 PROCEDURE — 36000710: Performed by: ORTHOPAEDIC SURGERY

## 2022-06-20 PROCEDURE — 36000711: Performed by: ORTHOPAEDIC SURGERY

## 2022-06-20 PROCEDURE — 27447 PR TOTAL KNEE ARTHROPLASTY: ICD-10-PCS | Mod: LT,,, | Performed by: ORTHOPAEDIC SURGERY

## 2022-06-20 PROCEDURE — C1751 CATH, INF, PER/CENT/MIDLINE: HCPCS | Performed by: ANESTHESIOLOGY

## 2022-06-20 PROCEDURE — 76942 ECHO GUIDE FOR BIOPSY: CPT | Performed by: ANESTHESIOLOGY

## 2022-06-20 PROCEDURE — 37000008 HC ANESTHESIA 1ST 15 MINUTES: Performed by: ORTHOPAEDIC SURGERY

## 2022-06-20 PROCEDURE — 94761 N-INVAS EAR/PLS OXIMETRY MLT: CPT

## 2022-06-20 PROCEDURE — 63600175 PHARM REV CODE 636 W HCPCS: Performed by: ORTHOPAEDIC SURGERY

## 2022-06-20 PROCEDURE — 63600175 PHARM REV CODE 636 W HCPCS: Performed by: ANESTHESIOLOGY

## 2022-06-20 PROCEDURE — 97165 OT EVAL LOW COMPLEX 30 MIN: CPT

## 2022-06-20 PROCEDURE — 99900035 HC TECH TIME PER 15 MIN (STAT)

## 2022-06-20 PROCEDURE — 76942 ECHO GUIDE FOR BIOPSY: CPT | Mod: 26,,, | Performed by: ANESTHESIOLOGY

## 2022-06-20 PROCEDURE — 97116 GAIT TRAINING THERAPY: CPT

## 2022-06-20 PROCEDURE — 25000003 PHARM REV CODE 250: Performed by: STUDENT IN AN ORGANIZED HEALTH CARE EDUCATION/TRAINING PROGRAM

## 2022-06-20 PROCEDURE — D9220A PRA ANESTHESIA: ICD-10-PCS | Mod: CRNA,,, | Performed by: NURSE ANESTHETIST, CERTIFIED REGISTERED

## 2022-06-20 PROCEDURE — 27447 TOTAL KNEE ARTHROPLASTY: CPT | Mod: LT,,, | Performed by: ORTHOPAEDIC SURGERY

## 2022-06-20 PROCEDURE — 37000009 HC ANESTHESIA EA ADD 15 MINS: Performed by: ORTHOPAEDIC SURGERY

## 2022-06-20 PROCEDURE — D9220A PRA ANESTHESIA: Mod: CRNA,,, | Performed by: NURSE ANESTHETIST, CERTIFIED REGISTERED

## 2022-06-20 PROCEDURE — 63600175 PHARM REV CODE 636 W HCPCS: Performed by: SURGERY

## 2022-06-20 PROCEDURE — 64448 NJX AA&/STRD FEM NRV NFS IMG: CPT | Mod: 59,LT,, | Performed by: ANESTHESIOLOGY

## 2022-06-20 PROCEDURE — 97535 SELF CARE MNGMENT TRAINING: CPT

## 2022-06-20 PROCEDURE — D9220A PRA ANESTHESIA: ICD-10-PCS | Mod: ANES,,, | Performed by: ANESTHESIOLOGY

## 2022-06-20 DEVICE — CEMENT BONE SMPLX HV GENTMYCN: Type: IMPLANTABLE DEVICE | Site: KNEE | Status: FUNCTIONAL

## 2022-06-20 DEVICE — PSN ALL POLY PAT 35MM: Type: IMPLANTABLE DEVICE | Site: KNEE | Status: FUNCTIONAL

## 2022-06-20 DEVICE — COMPONENT FEMORAL LEFT SIZE E: Type: IMPLANTABLE DEVICE | Site: KNEE | Status: FUNCTIONAL

## 2022-06-20 DEVICE — TIBIAL INSERT POST SZ EF 5-6 1: Type: IMPLANTABLE DEVICE | Site: KNEE | Status: FUNCTIONAL

## 2022-06-20 DEVICE — PLUG TAPER: Type: IMPLANTABLE DEVICE | Site: KNEE | Status: FUNCTIONAL

## 2022-06-20 DEVICE — TIBIAL PLATE STEMMED SZ 5: Type: IMPLANTABLE DEVICE | Site: KNEE | Status: FUNCTIONAL

## 2022-06-20 RX ORDER — SODIUM CHLORIDE 9 MG/ML
INJECTION, SOLUTION INTRAVENOUS
Status: COMPLETED | OUTPATIENT
Start: 2022-06-20 | End: 2022-06-20

## 2022-06-20 RX ORDER — ONDANSETRON 2 MG/ML
4 INJECTION INTRAMUSCULAR; INTRAVENOUS EVERY 8 HOURS PRN
Status: DISCONTINUED | OUTPATIENT
Start: 2022-06-20 | End: 2022-06-21 | Stop reason: HOSPADM

## 2022-06-20 RX ORDER — PROPOFOL 10 MG/ML
VIAL (ML) INTRAVENOUS
Status: DISCONTINUED | OUTPATIENT
Start: 2022-06-20 | End: 2022-06-20

## 2022-06-20 RX ORDER — FAMOTIDINE 20 MG/1
20 TABLET, FILM COATED ORAL 2 TIMES DAILY
Status: DISCONTINUED | OUTPATIENT
Start: 2022-06-20 | End: 2022-06-21 | Stop reason: HOSPADM

## 2022-06-20 RX ORDER — TALC
6 POWDER (GRAM) TOPICAL NIGHTLY PRN
Status: DISCONTINUED | OUTPATIENT
Start: 2022-06-20 | End: 2022-06-20 | Stop reason: HOSPADM

## 2022-06-20 RX ORDER — SODIUM CHLORIDE 0.9 % (FLUSH) 0.9 %
10 SYRINGE (ML) INJECTION
Status: DISCONTINUED | OUTPATIENT
Start: 2022-06-20 | End: 2022-06-20 | Stop reason: HOSPADM

## 2022-06-20 RX ORDER — ONDANSETRON 2 MG/ML
INJECTION INTRAMUSCULAR; INTRAVENOUS
Status: DISCONTINUED | OUTPATIENT
Start: 2022-06-20 | End: 2022-06-20

## 2022-06-20 RX ORDER — ASPIRIN 81 MG/1
81 TABLET ORAL 2 TIMES DAILY
Status: DISCONTINUED | OUTPATIENT
Start: 2022-06-20 | End: 2022-06-21 | Stop reason: HOSPADM

## 2022-06-20 RX ORDER — OXYCODONE HYDROCHLORIDE 10 MG/1
10 TABLET ORAL
Status: DISCONTINUED | OUTPATIENT
Start: 2022-06-20 | End: 2022-06-21 | Stop reason: HOSPADM

## 2022-06-20 RX ORDER — ACETAMINOPHEN 500 MG
1000 TABLET ORAL EVERY 6 HOURS
Status: DISCONTINUED | OUTPATIENT
Start: 2022-06-20 | End: 2022-06-21 | Stop reason: HOSPADM

## 2022-06-20 RX ORDER — AMOXICILLIN 250 MG
1 CAPSULE ORAL 2 TIMES DAILY
Status: DISCONTINUED | OUTPATIENT
Start: 2022-06-20 | End: 2022-06-21 | Stop reason: HOSPADM

## 2022-06-20 RX ORDER — PREGABALIN 75 MG/1
75 CAPSULE ORAL NIGHTLY
Status: DISCONTINUED | OUTPATIENT
Start: 2022-06-20 | End: 2022-06-21 | Stop reason: HOSPADM

## 2022-06-20 RX ORDER — TRANEXAMIC ACID 100 MG/ML
1000 INJECTION, SOLUTION INTRAVENOUS
Status: DISCONTINUED | OUTPATIENT
Start: 2022-06-20 | End: 2022-06-20 | Stop reason: HOSPADM

## 2022-06-20 RX ORDER — LIDOCAINE HYDROCHLORIDE 10 MG/ML
1 INJECTION, SOLUTION EPIDURAL; INFILTRATION; INTRACAUDAL; PERINEURAL
Status: DISCONTINUED | OUTPATIENT
Start: 2022-06-20 | End: 2022-06-20 | Stop reason: HOSPADM

## 2022-06-20 RX ORDER — CEFAZOLIN SODIUM/WATER 2 G/20 ML
2 SYRINGE (ML) INTRAVENOUS
Status: COMPLETED | OUTPATIENT
Start: 2022-06-20 | End: 2022-06-20

## 2022-06-20 RX ORDER — PROCHLORPERAZINE EDISYLATE 5 MG/ML
5 INJECTION INTRAMUSCULAR; INTRAVENOUS EVERY 6 HOURS PRN
Status: DISCONTINUED | OUTPATIENT
Start: 2022-06-20 | End: 2022-06-21 | Stop reason: HOSPADM

## 2022-06-20 RX ORDER — METOPROLOL SUCCINATE 25 MG/1
25 TABLET, EXTENDED RELEASE ORAL DAILY
Status: DISCONTINUED | OUTPATIENT
Start: 2022-06-21 | End: 2022-06-21 | Stop reason: HOSPADM

## 2022-06-20 RX ORDER — MUPIROCIN 20 MG/G
1 OINTMENT TOPICAL 2 TIMES DAILY
Status: DISCONTINUED | OUTPATIENT
Start: 2022-06-20 | End: 2022-06-21 | Stop reason: HOSPADM

## 2022-06-20 RX ORDER — PREGABALIN 75 MG/1
75 CAPSULE ORAL
Status: COMPLETED | OUTPATIENT
Start: 2022-06-20 | End: 2022-06-20

## 2022-06-20 RX ORDER — ROPIVACAINE HYDROCHLORIDE 5 MG/ML
INJECTION, SOLUTION EPIDURAL; INFILTRATION; PERINEURAL
Status: COMPLETED | OUTPATIENT
Start: 2022-06-20 | End: 2022-06-20

## 2022-06-20 RX ORDER — FENTANYL CITRATE 50 UG/ML
100 INJECTION, SOLUTION INTRAMUSCULAR; INTRAVENOUS
Status: DISCONTINUED | OUTPATIENT
Start: 2022-06-20 | End: 2022-06-20 | Stop reason: HOSPADM

## 2022-06-20 RX ORDER — CELECOXIB 200 MG/1
400 CAPSULE ORAL
Status: COMPLETED | OUTPATIENT
Start: 2022-06-20 | End: 2022-06-20

## 2022-06-20 RX ORDER — BISACODYL 10 MG
10 SUPPOSITORY, RECTAL RECTAL EVERY 12 HOURS PRN
Status: DISCONTINUED | OUTPATIENT
Start: 2022-06-20 | End: 2022-06-21 | Stop reason: HOSPADM

## 2022-06-20 RX ORDER — SODIUM CHLORIDE 9 MG/ML
INJECTION, SOLUTION INTRAVENOUS CONTINUOUS
Status: DISCONTINUED | OUTPATIENT
Start: 2022-06-20 | End: 2022-06-21 | Stop reason: HOSPADM

## 2022-06-20 RX ORDER — OXYCODONE HYDROCHLORIDE 5 MG/1
5 TABLET ORAL
Status: DISCONTINUED | OUTPATIENT
Start: 2022-06-20 | End: 2022-06-21 | Stop reason: HOSPADM

## 2022-06-20 RX ORDER — METHOCARBAMOL 750 MG/1
750 TABLET, FILM COATED ORAL 3 TIMES DAILY
Status: DISCONTINUED | OUTPATIENT
Start: 2022-06-20 | End: 2022-06-21 | Stop reason: HOSPADM

## 2022-06-20 RX ORDER — FENTANYL CITRATE 50 UG/ML
INJECTION, SOLUTION INTRAMUSCULAR; INTRAVENOUS
Status: DISCONTINUED | OUTPATIENT
Start: 2022-06-20 | End: 2022-06-20

## 2022-06-20 RX ORDER — POLYETHYLENE GLYCOL 3350 17 G/17G
17 POWDER, FOR SOLUTION ORAL DAILY
Status: DISCONTINUED | OUTPATIENT
Start: 2022-06-21 | End: 2022-06-21 | Stop reason: HOSPADM

## 2022-06-20 RX ORDER — MORPHINE SULFATE 2 MG/ML
2 INJECTION, SOLUTION INTRAMUSCULAR; INTRAVENOUS
Status: DISCONTINUED | OUTPATIENT
Start: 2022-06-20 | End: 2022-06-21 | Stop reason: HOSPADM

## 2022-06-20 RX ORDER — SODIUM CHLORIDE 9 MG/ML
INJECTION, SOLUTION INTRAVENOUS CONTINUOUS PRN
Status: DISCONTINUED | OUTPATIENT
Start: 2022-06-20 | End: 2022-06-20

## 2022-06-20 RX ORDER — VANCOMYCIN HYDROCHLORIDE 1 G/20ML
INJECTION, POWDER, LYOPHILIZED, FOR SOLUTION INTRAVENOUS
Status: DISCONTINUED | OUTPATIENT
Start: 2022-06-20 | End: 2022-06-20 | Stop reason: HOSPADM

## 2022-06-20 RX ORDER — FENTANYL CITRATE 50 UG/ML
25 INJECTION, SOLUTION INTRAMUSCULAR; INTRAVENOUS EVERY 5 MIN PRN
Status: DISCONTINUED | OUTPATIENT
Start: 2022-06-20 | End: 2022-06-20 | Stop reason: HOSPADM

## 2022-06-20 RX ORDER — ROPIVACAINE HYDROCHLORIDE 2 MG/ML
INJECTION, SOLUTION EPIDURAL; INFILTRATION; PERINEURAL CONTINUOUS
Status: DISCONTINUED | OUTPATIENT
Start: 2022-06-20 | End: 2022-06-21 | Stop reason: HOSPADM

## 2022-06-20 RX ORDER — CEFAZOLIN SODIUM/D5W 2 G/100 ML
2 PLASTIC BAG, INJECTION (ML) INTRAVENOUS
Status: COMPLETED | OUTPATIENT
Start: 2022-06-20 | End: 2022-06-21

## 2022-06-20 RX ORDER — FAMOTIDINE 10 MG/ML
INJECTION INTRAVENOUS
Status: DISCONTINUED | OUTPATIENT
Start: 2022-06-20 | End: 2022-06-20

## 2022-06-20 RX ORDER — ATORVASTATIN CALCIUM 20 MG/1
20 TABLET, FILM COATED ORAL DAILY
Status: DISCONTINUED | OUTPATIENT
Start: 2022-06-20 | End: 2022-06-21 | Stop reason: HOSPADM

## 2022-06-20 RX ORDER — DEXAMETHASONE SODIUM PHOSPHATE 4 MG/ML
INJECTION, SOLUTION INTRA-ARTICULAR; INTRALESIONAL; INTRAMUSCULAR; INTRAVENOUS; SOFT TISSUE
Status: DISCONTINUED | OUTPATIENT
Start: 2022-06-20 | End: 2022-06-20

## 2022-06-20 RX ORDER — NALOXONE HCL 0.4 MG/ML
0.02 VIAL (ML) INJECTION
Status: DISCONTINUED | OUTPATIENT
Start: 2022-06-20 | End: 2022-06-21 | Stop reason: HOSPADM

## 2022-06-20 RX ORDER — TRANEXAMIC ACID 100 MG/ML
INJECTION, SOLUTION INTRAVENOUS
Status: DISCONTINUED | OUTPATIENT
Start: 2022-06-20 | End: 2022-06-20

## 2022-06-20 RX ORDER — OXYCODONE HYDROCHLORIDE 5 MG/1
5 TABLET ORAL
Status: DISCONTINUED | OUTPATIENT
Start: 2022-06-20 | End: 2022-06-20

## 2022-06-20 RX ORDER — MIDAZOLAM HYDROCHLORIDE 1 MG/ML
4 INJECTION INTRAMUSCULAR; INTRAVENOUS
Status: DISCONTINUED | OUTPATIENT
Start: 2022-06-20 | End: 2022-06-20 | Stop reason: HOSPADM

## 2022-06-20 RX ORDER — MUPIROCIN 20 MG/G
1 OINTMENT TOPICAL
Status: COMPLETED | OUTPATIENT
Start: 2022-06-20 | End: 2022-06-20

## 2022-06-20 RX ORDER — ACETAMINOPHEN 500 MG
1000 TABLET ORAL
Status: COMPLETED | OUTPATIENT
Start: 2022-06-20 | End: 2022-06-20

## 2022-06-20 RX ORDER — PROPOFOL 10 MG/ML
VIAL (ML) INTRAVENOUS CONTINUOUS PRN
Status: DISCONTINUED | OUTPATIENT
Start: 2022-06-20 | End: 2022-06-20

## 2022-06-20 RX ORDER — KETAMINE HYDROCHLORIDE 10 MG/ML
INJECTION, SOLUTION INTRAMUSCULAR; INTRAVENOUS
Status: DISCONTINUED | OUTPATIENT
Start: 2022-06-20 | End: 2022-06-20

## 2022-06-20 RX ADMIN — ACETAMINOPHEN 1000 MG: 500 TABLET ORAL at 08:06

## 2022-06-20 RX ADMIN — SODIUM CHLORIDE, SODIUM GLUCONATE, SODIUM ACETATE, POTASSIUM CHLORIDE, MAGNESIUM CHLORIDE, SODIUM PHOSPHATE, DIBASIC, AND POTASSIUM PHOSPHATE: .53; .5; .37; .037; .03; .012; .00082 INJECTION, SOLUTION INTRAVENOUS at 01:06

## 2022-06-20 RX ADMIN — FAMOTIDINE 20 MG: 20 TABLET, FILM COATED ORAL at 09:06

## 2022-06-20 RX ADMIN — SODIUM CHLORIDE: 0.9 INJECTION, SOLUTION INTRAVENOUS at 08:06

## 2022-06-20 RX ADMIN — FAMOTIDINE 20 MG: 10 INJECTION, SOLUTION INTRAVENOUS at 11:06

## 2022-06-20 RX ADMIN — MUPIROCIN 1 G: 20 OINTMENT TOPICAL at 09:06

## 2022-06-20 RX ADMIN — KETAMINE HYDROCHLORIDE 10 MG: 10 INJECTION INTRAMUSCULAR; INTRAVENOUS at 11:06

## 2022-06-20 RX ADMIN — ACETAMINOPHEN 1000 MG: 500 TABLET ORAL at 06:06

## 2022-06-20 RX ADMIN — PREGABALIN 75 MG: 75 CAPSULE ORAL at 08:06

## 2022-06-20 RX ADMIN — OXYCODONE 5 MG: 5 TABLET ORAL at 09:06

## 2022-06-20 RX ADMIN — TRANEXAMIC ACID 1000 MG: 100 INJECTION, SOLUTION INTRAVENOUS at 11:06

## 2022-06-20 RX ADMIN — PROPOFOL 50 MCG/KG/MIN: 10 INJECTION, EMULSION INTRAVENOUS at 11:06

## 2022-06-20 RX ADMIN — ROPIVACAINE HYDROCHLORIDE 10 ML: 5 INJECTION, SOLUTION EPIDURAL; INFILTRATION; PERINEURAL at 09:06

## 2022-06-20 RX ADMIN — DEXAMETHASONE SODIUM PHOSPHATE 8 MG: 4 INJECTION, SOLUTION INTRAMUSCULAR; INTRAVENOUS at 11:06

## 2022-06-20 RX ADMIN — SODIUM CHLORIDE, SODIUM GLUCONATE, SODIUM ACETATE, POTASSIUM CHLORIDE, MAGNESIUM CHLORIDE, SODIUM PHOSPHATE, DIBASIC, AND POTASSIUM PHOSPHATE: .53; .5; .37; .037; .03; .012; .00082 INJECTION, SOLUTION INTRAVENOUS at 11:06

## 2022-06-20 RX ADMIN — PREGABALIN 75 MG: 75 CAPSULE ORAL at 09:06

## 2022-06-20 RX ADMIN — ATORVASTATIN CALCIUM 20 MG: 20 TABLET, FILM COATED ORAL at 09:06

## 2022-06-20 RX ADMIN — Medication: at 02:06

## 2022-06-20 RX ADMIN — SENNOSIDES AND DOCUSATE SODIUM 1 TABLET: 50; 8.6 TABLET ORAL at 09:06

## 2022-06-20 RX ADMIN — VANCOMYCIN HYDROCHLORIDE 1500 MG: 1.5 INJECTION, POWDER, LYOPHILIZED, FOR SOLUTION INTRAVENOUS at 08:06

## 2022-06-20 RX ADMIN — CELECOXIB 400 MG: 200 CAPSULE ORAL at 08:06

## 2022-06-20 RX ADMIN — MEPIVACAINE HYDROCHLORIDE 3.4 ML: 15 INJECTION, SOLUTION EPIDURAL; INFILTRATION at 11:06

## 2022-06-20 RX ADMIN — OXYCODONE 5 MG: 5 TABLET ORAL at 02:06

## 2022-06-20 RX ADMIN — PROPOFOL 30 MG: 10 INJECTION, EMULSION INTRAVENOUS at 11:06

## 2022-06-20 RX ADMIN — ONDANSETRON 4 MG: 2 INJECTION, SOLUTION INTRAMUSCULAR; INTRAVENOUS at 11:06

## 2022-06-20 RX ADMIN — Medication 2 G: at 11:06

## 2022-06-20 RX ADMIN — MIDAZOLAM 2 MG: 1 INJECTION INTRAMUSCULAR; INTRAVENOUS at 09:06

## 2022-06-20 RX ADMIN — Medication 2 G: at 07:06

## 2022-06-20 RX ADMIN — SODIUM CHLORIDE: 0.9 INJECTION, SOLUTION INTRAVENOUS at 09:06

## 2022-06-20 RX ADMIN — METHOCARBAMOL 750 MG: 750 TABLET ORAL at 09:06

## 2022-06-20 RX ADMIN — TRANEXAMIC ACID 1000 MG: 100 INJECTION, SOLUTION INTRAVENOUS at 12:06

## 2022-06-20 RX ADMIN — MUPIROCIN 1 G: 20 OINTMENT TOPICAL at 08:06

## 2022-06-20 RX ADMIN — FENTANYL CITRATE 50 MCG: 50 INJECTION, SOLUTION INTRAMUSCULAR; INTRAVENOUS at 11:06

## 2022-06-20 RX ADMIN — SODIUM CHLORIDE: 0.9 INJECTION, SOLUTION INTRAVENOUS at 03:06

## 2022-06-20 RX ADMIN — ASPIRIN 81 MG: 81 TABLET, COATED ORAL at 09:06

## 2022-06-20 NOTE — H&P
CC:  Left knee pain     Herber Camarillo is a 81 y.o. male with history of Left knee pain. Pain is worse with activity and weight bearing.  Patient has experienced interference of activities of daily living due to decreased range of motion and an increase in joint pain and swelling.  Patient has failed non-operative treatment including NSAIDs, corticosteroid injections, viscosupplement injections, and activity modification.  Herber Camarillo currently ambulates using assistive device .      Relevant medical conditions of significance in perioperative period:  Obesity:  BMI 32  CAD:presence of stents, on asa   HTN: on meds          Past Medical History:   Diagnosis Date    Arthritis      CAD (coronary artery disease) 10/30/2012    Cataract      Dermatochalasis of both upper eyelids      Fever blister      Glucose intolerance (impaired glucose tolerance) 7/24/2013    Heart attack      Hyperlipidemia 10/30/2012    Joint pain      Keloid cicatrix      Lung nodule, solitary 4/30/2015    Non morbid obesity due to excess calories 10/20/2015    Obesity      Overweight(278.02) 9/5/2013    Prostate cancer      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               Past Surgical History:   Procedure Laterality Date    CARDIAC CATHETERIZATION   01/2000    CATARACT EXTRACTION W/  INTRAOCULAR LENS IMPLANT Left 9/28/2020     Procedure: EXTRACTION, CATARACT, WITH IOL INSERTION;  Surgeon: Nam Morin MD;  Location: [facility] OR;  Service: Ophthalmology;  Laterality: Left;    CATARACT EXTRACTION W/  INTRAOCULAR LENS IMPLANT Right 10/12/2020     Procedure: EXTRACTION, CATARACT, WITH IOL INSERTION;  Surgeon: Nam Morin MD;  Location: [facility] OR;  Service: Ophthalmology;  Laterality: Right;  laser    CHOLECYSTECTOMY        HERNIA REPAIR        HIP SURGERY Right 01/11/2017     THR    PROSTATE SURGERY        SHOULDER OPEN ROTATOR CUFF REPAIR        TONSILLECTOMY                   Family History   Problem Relation Age of Onset     Heart disease Father      Cancer Sister           Lung    Heart disease Brother      Heart disease Brother      Heart disease Mother      Hyperlipidemia Daughter      No Known Problems Son      No Known Problems Daughter      Amblyopia Neg Hx      Blindness Neg Hx      Cataracts Neg Hx      Diabetes Neg Hx      Glaucoma Neg Hx      Hypertension Neg Hx      Macular degeneration Neg Hx      Retinal detachment Neg Hx      Strabismus Neg Hx      Stroke Neg Hx      Thyroid disease Neg Hx      Melanoma Neg Hx      Psoriasis Neg Hx      Lupus Neg Hx      Eczema Neg Hx      Acne Neg Hx      Asthma Neg Hx      Emphysema Neg Hx                 Review of patient's allergies indicates:   Allergen Reactions    Novocain [procaine] Shortness Of Breath                    Current Outpatient Medications:     aspirin (ECOTRIN) 81 MG EC tablet, Take 81 mg by mouth once daily., Disp: , Rfl:     atorvastatin (LIPITOR) 20 MG tablet, Take 1 tablet (20 mg total) by mouth once daily., Disp: 90 tablet, Rfl: 3    b complex vitamins tablet, Take 1 tablet by mouth once daily., Disp: , Rfl:     metoprolol succinate (TOPROL-XL) 25 MG 24 hr tablet, Take 1 tablet (25 mg total) by mouth once daily., Disp: 90 tablet, Rfl: 3    omega-3 fatty acids-vitamin E 1,000 mg Cap, Take by mouth., Disp: , Rfl:     acetaminophen (TYLENOL ORAL), Take by mouth., Disp: , Rfl:     ibuprofen (ADVIL,MOTRIN) 200 MG tablet, Take 200 mg by mouth every 6 (six) hours as needed for Pain., Disp: , Rfl:      Review of Systems:  Constitutional: no fever or chills  Eyes: no visual changes  ENT: no nasal congestion or sore throat  Respiratory: no cough or shortness of breath  Cardiovascular: no chest pain or palpitations  Gastrointestinal: no nausea or vomiting, tolerating diet  Genitourinary: no hematuria or dysuria  Integument/Breast: no rash or pruritis  Hematologic/Lymphatic: no easy bruising or lymphadenopathy  Musculoskeletal: positive for  "knee pain  Neurological: no seizures or tremors  Behavioral/Psych: no auditory or visual hallucinations  Endocrine: no heat or cold intolerance     PE:  Ht 5' 7" (1.702 m)   Wt 90 kg (198 lb 6.4 oz)   BMI 31.07 kg/m²   General: Pleasant, cooperative, NAD   Gait: antalgic  HEENT: NCAT, sclera nonicteric   Lungs: Respirations clear bilaterally; equal and unlabored.   CV: S1S2; 2+ bilateral upper and lower extremity pulses.   Skin: Intact throughout with no rashes, erythema, or lesions  Extremities: No LE edema,  no erythema or warmth of the skin in either lower extremity.     Left knee exam:  Knee Range of Motion:5-115 active, pain with passive range of motion  Effusion:none  Condition of skin:intact  Location of tenderness:Medial joint line   Strength:5 of 5 quadriceps strength and 5 of 5 hamstring strength  Stability: stable to testing     Hip Examination: painless PROM of hip      Radiographs: Radiographs reveal advanced degenerative changes including subchondral cyst formation, subchondral sclerosis, osteophyte formation, joint space narrowing.      Knee Alignment:  Moderate varus     Diagnosis: Primary osteoarthritis Left knee     Plan: Left total knee arthroplasty     Due to the serious nature of total joint infection and the high prevalence of community acquired MRSA, vancomycin will be used perioperatively.       "

## 2022-06-20 NOTE — ANESTHESIA PROCEDURE NOTES
Spinal    Diagnosis: L knee pain  Patient location during procedure: OR  Start time: 6/20/2022 11:18 AM  Timeout: 6/20/2022 11:17 AM  End time: 6/20/2022 11:20 AM    Staffing  Authorizing Provider: Fidelina Marie MD  Performing Provider: Fidelina Marie MD    Preanesthetic Checklist  Completed: patient identified, IV checked, site marked, risks and benefits discussed, surgical consent, monitors and equipment checked, pre-op evaluation and timeout performed  Spinal Block  Patient position: sitting  Patient monitoring: heart rate, cardiac monitor, continuous pulse ox, continuous capnometry and frequent blood pressure checks  Approach: midline  Location: L3-4  Injection technique: single shot  CSF Fluid: clear free-flowing CSF  Needle  Needle type: Dilma   Needle gauge: 25 G  Needle length: 3.5 in  Additional Documentation: no paresthesia on injection  Needle localization: anatomical landmarks  Assessment  Sensory level: T9   Dermatomal levels determined by pinch or prick  Ease of block: easy  Patient's tolerance of the procedure: comfortable throughout block and no complaints  Medications:    Medications: mepivacaine (CARBOCAINE) injection 15 mg/mL (1.5%) - Other   3.4 mL - 6/20/2022 11:18:00 AM

## 2022-06-20 NOTE — PLAN OF CARE
Problem: Occupational Therapy  Goal: Occupational Therapy Goal  Description: Goals to be met by: 6/24/22    Patient will increase functional independence with ADLs by performing:    UE Dressing with Modified [place].  LE Dressing with Supervision.  Grooming while standing at sink with Supervision.  Toileting from toilet with Supervision for hygiene and clothing management.   Toilet transfer to toilet with Supervision.    Outcome: Ongoing, Progressing    OT evaluation with goals established. Patient completed bed mobility at contact guard assistance. He completed toilet task in standing at contact guard assistance along with grooming task in standing.

## 2022-06-20 NOTE — PLAN OF CARE
Report received to 301. Patient arrived to unit AAOx4 ,in hospital bed from PACU. VSS, IVF infusing. Dressing to lt knee, CDI, polar care and fcds in use.  Pt lying supine in bed. Patient oriented to room. Bed in lowest position, side rails up x2, bed wheels locked and call light within reach.  Pt instructed to call for assistance, verbalized understanding. NADN. Will continue to monitor.Family at bs      Problem: Adult Inpatient Plan of Care  Goal: Plan of Care Review  Outcome: Ongoing, Progressing  Flowsheets (Taken 6/20/2022 1655)  Plan of Care Reviewed With: patient     Problem: Adult Inpatient Plan of Care  Goal: Patient-Specific Goal (Individualized)  Outcome: Ongoing, Progressing  Flowsheets (Taken 6/20/2022 1655)  Anxieties, Fears or Concerns: general re surgery  Individualized Care Needs: keep up to date re poc  Patient-Specific Goals (Include Timeframe): pain control     Problem: Adult Inpatient Plan of Care  Goal: Absence of Hospital-Acquired Illness or Injury  Intervention: Identify and Manage Fall Risk  Flowsheets (Taken 6/20/2022 1655)  Safety Promotion/Fall Prevention:   assistive device/personal item within reach   Fall Risk reviewed with patient/family   medications reviewed   nonskid shoes/socks when out of bed   side rails raised x 2   instructed to call staff for mobility   Supervised toileting - stay within arms reach     Problem: Respiratory Compromise (Knee Arthroplasty)  Goal: Effective Oxygenation and Ventilation  Intervention: Optimize Oxygenation and Ventilation  Flowsheets (Taken 6/20/2022 1655)  Administration (IS): instruction provided, follow-up

## 2022-06-20 NOTE — ANESTHESIA PROCEDURE NOTES
L Adductor Cath    Patient location during procedure: pre-op   Block not for primary anesthetic.  Reason for block: at surgeon's request and post-op pain management   Post-op Pain Location: L knee pain   Start time: 6/20/2022 9:03 AM  Timeout: 6/20/2022 9:00 AM   End time: 6/20/2022 9:17 AM    Staffing  Authorizing Provider: Fidelina Marie MD  Performing Provider: Fidelina Marie MD    Preanesthetic Checklist  Completed: patient identified, IV checked, site marked, risks and benefits discussed, surgical consent, monitors and equipment checked, pre-op evaluation and timeout performed  Peripheral Block  Patient position: supine  Prep: ChloraPrep and site prepped and draped  Patient monitoring: heart rate, cardiac monitor, continuous pulse ox, continuous capnometry and frequent blood pressure checks  Block type: adductor canal  Laterality: left  Injection technique: continuous  Needle  Needle type: Tuohy   Needle gauge: 17 G  Needle length: 3.5 in  Needle localization: anatomical landmarks and ultrasound guidance  Needle insertion depth: 3 cm  Catheter type: spring wound  Catheter size: 19 G  Catheter at skin depth: 8 cm  Test dose: lidocaine 1.5% with Epi 1-to-200,000 and negative   -ultrasound image captured on disc.  Assessment  Injection assessment: negative aspiration, negative parasthesia and local visualized surrounding nerve  Paresthesia pain: none  Heart rate change: no  Slow fractionated injection: yes  Pain Tolerance: comfortable throughout block and no complaints  Medications:    Medications: ropivacaine (NAROPIN) injection 0.5%, 10 mL    Additional Notes  VSS.  [facility] RN monitoring vitals throughout procedure.  Patient tolerated procedure well.     0.25% ropiv with 1: 400 k epi; 5 cc @ NVM

## 2022-06-20 NOTE — HPI
CC:  Left knee pain     Herber Camarillo is a 81 y.o. male with history of Left knee pain. Pain is worse with activity and weight bearing.  Patient has experienced interference of activities of daily living due to decreased range of motion and an increase in joint pain and swelling.  Patient has failed non-operative treatment including NSAIDs, corticosteroid injections, viscosupplement injections, and activity modification.  Herber Camarillo currently ambulates using assistive device .      Relevant medical conditions of significance in perioperative period:  Obesity:  BMI 32  CAD:presence of stents, on asa   HTN: on meds

## 2022-06-20 NOTE — PROGRESS NOTES
Ortho POD# 0    Subjective   s/p LTKA  Doing well, no acute events postop      Objective   X-rays reviewed--implants well positioned without signs of complications    VSSAF  General appearance - no acute distress  Musculoskeletal -  Dressing C/D/I  Fires quad/TA/EHL/GSC  SILT SP/DP/TN  WWP distally  Calves soft, nontender bilateral         Assessment   s/p LTKA      Plan   Continue PT - WBAT    Continue anticoagulation - ASA/SCDs    D/c planning - home with outpatient PT when clears PT

## 2022-06-20 NOTE — OP NOTE
Nguyen Left TKA  OPERATIVE NOTE    Date of Operation:   6/20/2022    Providers Performing:   Surgeon(s):  Rodolfo Nguyen MD  Assistant: Marlon Matos MD    Operative Procedure:   Left Total Knee Arthroplasty, CPT 22678    Preoperative Diagnosis:   Left Knee Osteoarthritis, ICD-10 M17.12    Postoperative Diagnosis:   SAME    Components Used:     Implant Name Type Inv. Item Serial No.  Lot No. LRB No. Used Action   SHELL CONTINUUM MULTIHOLE 50MM - [id number]  SHELL CONTINUUM MULTIHOLE 50MM  [employer] [id number] Left 1 Implanted   SCREW BONE 6.5X30 SELF-TAP - [id number]  SCREW BONE 6.5X30 SELF-TAP  [employer] [id number] Left 1 Implanted   SCREW BONE 6.5X30 SELF-TAP - [id number]  SCREW BONE 6.5X30 SELF-TAP  [employer] [id number] Left 1 Implanted   LINER CONTINUUM NEUT HH 32X50 - [id number]  LINER CONTINUUM NEUT HH 32X50  [employer] [id number] Left 1 Implanted   Femoral Stem Cementless Wesley Standard Offset 12/14 Taper Size 3    [employer] BIOMET [id number] Left 1 Implanted   HEAD BIOLOX DELTA 32MM -3.5 - [id number]  HEAD BIOLOX DELTA 32MM -3.5  [employer] [id number] Left 1 Implanted       Anesthesia:   Spinal  ACC    Estimated Blood Loss:   100 cc    Specimens:   None    Complications:   None    Indications:   The patient has failed non-operative measures including medications, injections and activity modifications for their left knee.  After an explanation of risks and benefits of knee arthroplasty surgery, the patient wishes to proceed with surgery.    Operative Notes:   After the induction of satisfactory anesthesia, the patient's left lower extremity was prepped and draped in the usual sterile fashion with the tourniquet cuff in place. The extremity was exsanguinated with an Esmarch bandage, and the tourniquet inflated. An anterior midline incision was made, and the dissection was carried sharply through the subcutaneous tissues and prepatellar bursa layer which was reflected medially. A modified medial  parapatellar arthrotomy was performed, and the patella was subluxated laterally. There was a large amount of clear joint fluid. Most of the fat pad was excised, and the medial release was completed around to the mid coronal point, subsequently to the posteromedial corner. A drill hole was made in the distal femur, and the canal was suctioned. The sword was placed in 5 degrees of valgus. The distal femoral cut was made.  The femur was sized, and the secondary femoral cuts were made in 3° external rotation. The knee was hyperflexed, and a drill hole was made in the footprint of the ACL. The canal was suctioned. An intramedullary alignment tobias was placed with good purchase in the isthmus. The upper tibial cut was made perpendicular in both planes. The flexion gap was checked after removing cruciate and meniscal remnants, and was found to be satisfactory with a spacer block, after additional release along the lateral joint line, and additional lateral tibial resection using a drop tobias for alignment. Flexion and extension gaps were symmetric. The femoral finishing guide was used, and trials were inserted. We used a tibial baseplate template which gave us good coverage. The rotational position of it was marked with the Bovie after ranging the knee.  We had full extension without hyperextension, full flexion, good stability in both.  The patella was measured with a caliper and a free hand cut was made such that the post reconstruction thickness would equal precut thickness. Three holes were drilled for an all poly patella. The trials were removed, and the tibia was drilled and punched. The bone was cleaned with pulsatile lavage and dried thoroughly. The components were impacted in the usual fashion with Simplex HV cement. Patellar tracking was central without lateral release. A lateral patellaplasty was performed. The knee was irrigated with Betadine and 3 liters of pulsatile lavage.  Vancomycin powder 1 g was placed in  the joint.  The arthrotomy was repaired with running and interrupted figure-of-eight sutures of #1 Vicryl. The prepatellar bursa tissue was closed with interrupted 0 Vicryl. The skin was closed with interrupted, inverted 2-0 Vicryl, 3-0 Monocryl, and Dermabond. An Aquacel and Carmona dressing was applied, the drains were connected, and the patient was taken to the PACU in stable condition without apparent orthopedic or anesthetic complications.    Sponge and needle counts were correct.    The first-assistant was critical to all steps of the operation, including retraction and leg stabilization during exposure and bone preparation, as well as the deep and superficial wound closure.  Dr. Nguyen performed and/or supervised the key portions of this surgical procedure, including evaluation of the bone cuts, reshaping of the bony elements, and insertion of the provisional and final components.    Postoperative Plan:  The patient will be anticoagulated with 81mg aspirin twice daily for one month.   They will receive 24 hours of post-operative antibiotics.    Activity will be weight bearing as tolerated.    Signed by: Rodolfo Nguyen MD

## 2022-06-20 NOTE — ANESTHESIA PREPROCEDURE EVALUATION
06/20/2022  Herber Camarillo is a 81 y.o., male.  Pre-operative evaluation for Procedure(s) (LRB):  ARTHROPLASTY, KNEE/ KAVON NEXGEN/ CEMENTED TIBIA (Left)    Herber Camarillo is a 81 y.o. male     Patient Active Problem List   Diagnosis    Uric acid crystallopathy    Hyperlipidemia    Prostate cancer: discharged from the urology clinic in 2011.    Atherosclerosis of aorta: see CT scan 7/15    Lung nodule, solitary .7 cm 7/15; stable 12/16, also 2/19    Coronary artery disease involving native coronary artery of native heart without angina pectoris    Non morbid obesity due to excess calories    Primary osteoarthritis of right hip    Primary osteoarthritis of right knee    Primary osteoarthritis of left knee    Impaired mobility and ADLs       Review of patient's allergies indicates:   Allergen Reactions    Novocain [procaine] Shortness Of Breath             No current facility-administered medications on file prior to encounter.     Current Outpatient Medications on File Prior to Encounter   Medication Sig Dispense Refill    b complex vitamins tablet Take 1 tablet by mouth once daily.      ibuprofen (ADVIL,MOTRIN) 200 MG tablet Take 200 mg by mouth every 6 (six) hours as needed for Pain.      omega-3 fatty acids-vitamin E 1,000 mg Cap Take by mouth.         Past Surgical History:   Procedure Laterality Date    CARDIAC CATHETERIZATION  01/2000    CATARACT EXTRACTION W/  INTRAOCULAR LENS IMPLANT Left 9/28/2020    Procedure: EXTRACTION, CATARACT, WITH IOL INSERTION;  Surgeon: Nam Morin MD;  Location: [facility];  Service: Ophthalmology;  Laterality: Left;    CATARACT EXTRACTION W/  INTRAOCULAR LENS IMPLANT Right 10/12/2020    Procedure: EXTRACTION, CATARACT, WITH IOL INSERTION;  Surgeon: Nam Morin MD;  Location: [facility];  Service: Ophthalmology;  Laterality: Right;  laser    CHOLECYSTECTOMY    "   HERNIA REPAIR      HIP SURGERY Right 01/11/2017    THR    PROSTATE SURGERY      SHOULDER OPEN ROTATOR CUFF REPAIR      TONSILLECTOMY           CBC:  Lab Results   Component Value Date    WBC 7.29 06/14/2022    RBC 5.46 06/14/2022    HGB 17.1 06/14/2022    HCT 51.3 06/14/2022     06/14/2022    MCV 94 06/14/2022    MCH 31.3 (H) 06/14/2022    MCHC 33.3 06/14/2022       CMP:   Lab Results   Component Value Date     06/14/2022    K 4.9 06/14/2022     06/14/2022    CO2 25 06/14/2022    BUN 11 06/14/2022    CREATININE 0.9 06/14/2022    GLU 91 06/14/2022    CALCIUM 9.3 06/14/2022       INR:  Lab Results   Component Value Date    INR 1.0 06/14/2022         Diagnostic Studies:      EKG:   Results for orders placed or performed during the hospital encounter of 06/14/22   EKG 12-lead    Collection Time: 06/14/22 11:40 AM    Narrative    Test Reason : Z01.818,    Vent. Rate : 056 BPM     Atrial Rate : 056 BPM     P-R Int : 174 ms          QRS Dur : 070 ms      QT Int : 430 ms       P-R-T Axes : 047 037 030 degrees     QTc Int : 414 ms    Sinus bradycardia  Otherwise normal ECG  No previous ECGs available  Confirmed by LIZ STAPLETON MD ([id number]) on 6/14/2022 12:49:13 PM    Referred By: GABRIEL MENON           Confirmed By:LIZ STAPLETON MD        2D Echo:  No results found for this or any previous visit.    Stress Test:   No results found for this or any previous visit.      Pre-op Vitals   BP Pulse Resp Temp SpO2   06/20/22 0832 06/20/22 0832 06/20/22 0832 06/20/22 0832 06/20/22 0832   (!) 168/84 (!) 58 18 36.6 °C (97.8 °F) 99 %      Height Weight BMI (Calculated)     06/20/22 0832 06/06/22 1328 06/20/22 0832     5' 7" 212 lb 8.4 oz 31.2         Pre-op Vitals   BP Pulse Resp Temp SpO2   06/20/22 0832 06/20/22 0832 06/20/22 0832 06/20/22 0832 06/20/22 0832   (!) 168/84 (!) 58 18 36.6 °C (97.8 °F) 99 %      Height Weight BMI (Calculated)     06/20/22 0832 06/06/22 1328 06/20/22 0832     5' 7" 212 lb 8.4 oz " 31.2          Pre-op Assessment          Review of Systems      Physical Exam  General: Well nourished    Airway:  Mallampati: I / I  Mouth Opening: Normal  TM Distance: Normal  Tongue: Normal  Neck ROM: Normal ROM    Dental:  Intact    Chest/Lungs:  Clear to auscultation    Heart:  Rate: Normal  Rhythm: Regular Rhythm  Sounds: Normal        Anesthesia Plan  Type of Anesthesia, risks & benefits discussed:    Anesthesia Type: MAC, Gen ETT, Gen Supraglottic Airway, Gen Natural Airway, CSE, Spinal, Epidural, Regional  Intra-op Monitoring Plan: Standard ASA Monitors  Post Op Pain Control Plan: multimodal analgesia and peripheral nerve block  Induction:  IV  Informed Consent: Informed consent signed with the Patient and all parties understand the risks and agree with anesthesia plan.  All questions answered.   ASA Score: 3  Day of Surgery Review of History & Physical: H&P Update referred to the surgeon/provider.    Ready For Surgery From Anesthesia Perspective.     .

## 2022-06-20 NOTE — ASSESSMENT & PLAN NOTE
81 y.o. male POD1 s/p L TKA    Pain control: multimodal per surgical home  PT/OT: WBAT LLE  DVT PPx: ASA 81mg BID, FCDs at all times when not ambulating  Abx: postop Ancef  Bulky dressing d/c this am    Dispo: d/c home with home pending PT

## 2022-06-20 NOTE — ANESTHESIA POSTPROCEDURE EVALUATION
Anesthesia Post Evaluation    Patient: Herber Camarillo    Procedure(s) Performed: Procedure(s) (LRB):  ARTHROPLASTY, KNEE/ KAVON NEXGEN/ CEMENTED TIBIA (Left)    Final Anesthesia Type: spinal      Patient location during evaluation: PACU  Patient participation: Yes- Able to Participate  Level of consciousness: awake and alert  Post-procedure vital signs: reviewed and stable  Pain management: adequate  Airway patency: patent    PONV status at discharge: No PONV  Anesthetic complications: no      Cardiovascular status: blood pressure returned to baseline  Respiratory status: unassisted  Hydration status: euvolemic  Follow-up not needed.          Vitals Value Taken Time   /70 06/20/22 1417   Temp 36.2 °C (97.1 °F) 06/20/22 1331   Pulse 49 06/20/22 1426   Resp 21 06/20/22 1426   SpO2 98 % 06/20/22 1426   Vitals shown include unvalidated device data.      No case tracking events are documented in the log.      Pain/Robin Score: Pain Rating Prior to Med Admin: 0 (6/20/2022  8:30 AM)

## 2022-06-20 NOTE — PT/OT/SLP EVAL
"Occupational Therapy   Evaluation    Name: Herber Camarillo  MRN: [id number]  Admitting Diagnosis:  Primary osteoarthritis of left knee Day of Surgery    Recommendations:     Discharge Recommendations: home  Discharge Equipment Recommendations:  bedside commode  Barriers to discharge:  None    Assessment:     Herber Camarillo is a 81 y.o. male with a medical diagnosis of Primary osteoarthritis of left knee.  He presents s/p left TKA. Patient completed bed mobility at contact guard assistance. He completed toilet task in standing at contact guard assistance along with grooming task in standing. Patient would benefit from skilled OT needs s/p left TKA to increase independence with ADls and ADL transfers. Performance deficits affecting function: impaired functional mobilty, weakness, gait instability, impaired balance, impaired self care skills, decreased lower extremity function, decreased ROM.      Rehab Prognosis: Good; patient would benefit from acute skilled OT services to address these deficits and reach maximum level of function.       Plan:     Patient to be seen daily to address the above listed problems via self-care/home management, therapeutic activities, therapeutic exercises  · Plan of Care Expires: 06/24/22  · Plan of Care Reviewed with: patient, spouse, daughter, other (see comments) (son n law)    Subjective     Chief Complaint: "pain in knee"   Patient/Family Comments/goals: "garden"     Occupational Profile:  Living Environment: Patient lives with their spouse in 1 level home with 1 steps to enter and has a 1 set into dinning room, walk in shower   Previous level of function: independent with ADLs, ambulated with walking stick   Roles and Routines: enjoys yard work   Equipment Used at Home:  walker, rolling, bath bench (walking stick)  Assistance upon Discharge: family     Pain/Comfort:  · Pain Rating 1: 3/10  · Location - Side 1: Left  · Location - Orientation 1: generalized  · Location 1: knee  · Pain Addressed " 1: Pre-medicate for activity, Reposition, Distraction    Patients cultural, spiritual, Gnosticist conflicts given the current situation: no    Objective:     Communicated with: Nursing prior to session.  Patient found supine with perineural catheter, FCD, peripheral IV, cryotherapy (Nimbus) upon OT entry to room.    General Precautions: Standard, fall   Orthopedic Precautions:LLE weight bearing as tolerated   Braces: N/A     Occupational Performance:    Bed Mobility:    · Patient completed Scooting/Bridging with contact guard assistance  · Patient completed Supine to Sit with contact guard assistance    Functional Mobility/Transfers:  · Patient completed Sit <> Stand Transfer with contact guard assistance  with  rolling walker    · Patient completed chair transfer with step transfer with contact guard assistance with rolling walker   · Functional Mobility: patient ambulated to/from bathroom with use of rolling walker at contact guard assistance     Activities of Daily Living:  · Grooming: contact guard assistance standing sink to wash hands   · Upper Body Dressing: minimum assistance sitting edge of bed to don gown for back, IV in   · Toileting: contact guard assistance standing at edge of bed with use of urinal and rolling walker, completed at edge of bed secondary to urgency     Cognitive/Visual Perceptual:  Cognitive/Psychosocial Skills:     -       Oriented to: Person, Place and Time   -       Follows Commands/attention:Follows multistep  commands    Physical Exam:  Upper Extremity Range of Motion:     -       Right Upper Extremity: WFL  -       Left Upper Extremity: WFL  Upper Extremity Strength:    -       Right Upper Extremity: WFL  -       Left Upper Extremity: WFL    AMPAC 6 Click ADL:  AMPAC Total Score: 19    Treatment & Education:  Patient educated on hand placement for transfers    Patient educated on rolling walker placement for ADLs  Patient educated on polar ice use   Patient educated on role OT and  plan of care s/p surgery at [facility], white board updated as needed       Patient left up in chair with direct handoff to physical therapy with RN notified and family present      GOALS:   Multidisciplinary Problems     Occupational Therapy Goals        Problem: Occupational Therapy    Goal Priority Disciplines Outcome Interventions   Occupational Therapy Goal     OT, PT/OT Ongoing, Progressing    Description: Goals to be met by: 6/24/22    Patient will increase functional independence with ADLs by performing:    UE Dressing with Modified [place].  LE Dressing with Supervision.  Grooming while standing at sink with Supervision.  Toileting from toilet with Supervision for hygiene and clothing management.   Toilet transfer to toilet with Supervision.                     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       Past Surgical History:   Procedure Laterality Date    CARDIAC CATHETERIZATION  01/2000    CATARACT EXTRACTION W/  INTRAOCULAR LENS IMPLANT Left 9/28/2020    Procedure: EXTRACTION, CATARACT, WITH IOL INSERTION;  Surgeon: Nam Morin MD;  Location: [facility] OR;  Service: Ophthalmology;  Laterality: Left;    CATARACT EXTRACTION W/  INTRAOCULAR LENS IMPLANT Right 10/12/2020    Procedure: EXTRACTION, CATARACT, WITH IOL INSERTION;  Surgeon: Nam Morin MD;  Location: [facility] OR;  Service: Ophthalmology;  Laterality: Right;  laser    CHOLECYSTECTOMY      HERNIA REPAIR      HIP SURGERY Right 01/11/2017     THR    PROSTATE SURGERY      SHOULDER OPEN ROTATOR CUFF REPAIR      TONSILLECTOMY         Time Tracking:     OT Date of Treatment: 06/20/22  OT Start Time: 1610  OT Stop Time: 1636  OT Total Time (min): 26 min    Billable Minutes:Evaluation 10  Self Care/Home Management 16    Spring Blount OT  6/20/2022

## 2022-06-20 NOTE — PLAN OF CARE
Patient tolerated PT session well. Patient ambulated 60ft with RW and contact guard assistance. No LOB or SOB noted. Patient has an OPPT appointment on 2022. Multiple family members present during PT session.       Problem: Physical Therapy  Goal: Physical Therapy Goal  Description: Goals to be met by: 2022    Patient will increase functional independence with mobility by performin. Supine to sit with supervision  2. Sit to stand transfer with Supervision  3. Gait x200 feet with Supervision using Rolling Walker  4. Ascend/Descend 1 curb step with Stand by assistance using Rolling Walker  5. Lower extremity exercise program x30 reps per handout, with supervision        Outcome: Ongoing, Progressing

## 2022-06-20 NOTE — HOSPITAL COURSE
On 6/20/22, the patient arrived to the [facility] for proper pre-operative management.  Upon completion of pre-operative preparation, the patient was taken back to the operative theatre. L TKA was performed without complication and the patient was transported to the post anesthesia care unit in stable condition.  After appropriate recovery from the anaesthetic agents used during the surgery, the patient was then transported to the hospital inpatient floor.  The interim of the hospital stay from arrival on the floor up to discharge has been uncomplicated. The patient has tolerated regular diet.  The patient's pain has been controlled using a multimodal approach. Currently, the patient's pain is well controlled on an oral regimen.  The patient has been voiding without difficulty.  The patient began participation in physical therapy after surgery and has progressed throughout the entire hospital stay.  Currently, the patient's progress is sufficient to allow the them to be discharged to home safely.  The patient agrees with this assessment and desires a discharge today.

## 2022-06-20 NOTE — PT/OT/SLP EVAL
Physical Therapy Evaluation and Treatment     Patient Name:  Herber Camarillo   MRN:  [id number]    Recommendations:     Discharge Recommendations:  outpatient PT   Discharge Equipment Recommendations: bedside commode   Barriers to discharge: None    Assessment:     Herber Camarillo is a 81 y.o. male admitted with a medical diagnosis of Primary osteoarthritis of left knee.  He presents with the following impairments/functional limitations:  weakness, impaired functional mobilty, gait instability, impaired balance, decreased lower extremity function, pain, decreased ROM, impaired skin, orthopedic precautions. He had a R LIBBY by Dr Navarro in 2017 and had no difficulties during his recovery per patient report. Patient tolerated PT session well. Patient ambulated 60ft with RW and contact guard assistance. No LOB or SOB noted. Patient has an OPPT appointment on 7/6/2022. Multiple family members present during PT session.     Rehab Prognosis: Good; patient would benefit from acute skilled PT services to address these deficits and reach maximum level of function.    Recent Surgery: Procedure(s) (LRB):  ARTHROPLASTY, KNEE/ KAVON NEXGEN/ CEMENTED TIBIA (Left) Day of Surgery    Plan:     During this hospitalization, patient to be seen daily to address the identified rehab impairments via gait training, therapeutic exercises, therapeutic activities and progress toward the following goals:    · Plan of Care Expires:  06/24/22    Subjective     Chief Complaint: Left knee pain.   Patient/Family Comments/goals: To get back in his garden.   Pain/Comfort:  · Pain Rating 1: 3/10  · Location - Side 1: Left  · Location - Orientation 1: generalized  · Location 1: knee  · Pain Addressed 1: Pre-medicate for activity, Reposition, Distraction    Patients cultural, spiritual, Mandaeism conflicts given the current situation:  n/a    Living Environment:  Patient lives with his wife in a [facility] with 1 step to enter. Prior to admission, patients level of  function was modified independent with his walking stick.  Equipment at home: walker, rolling, bath bench (walking stick). Upon discharge, patient will have assistance from family.    Objective:     Communicated with RN prior to session.  Patient found up in chair with perineural catheter, FCD, peripheral IV, cryotherapy (Nimbus)  upon PT entry to room. Wife, daughters, and son in law present.     General Precautions: Standard, fall   Orthopedic Precautions:LLE weight bearing as tolerated   Braces: N/A  Respiratory Status: Room air    Exams:  · Cognitive Exam:  Patient is oriented to Person, Place, Time and Situation  · Gross Motor Coordination:  WFL  · Sensation:    · -       Intact  · RLE ROM: WFL  · RLE Strength: WFL  · LLE ROM: WFL except limited at knee due to pain  · LLE Strength: appears WFL but did not formally assess due to pain    Functional Mobility:  · Transfers:     · Sit to Stand:  contact guard assistance with rolling walker x1 from bedside chair   · Gait: Patient ambulated 60ft with Rolling Walker and contact guard assistance using 3-point gait. Patient demonstrated decreased archie and decreased step length during gait due to pain, decreased ROM, and decreased strength.    Therapeutic Activities and Exercises:  Patient and family educated in:  -PT role and POC  -safety with transfers including hand placement  -gait sequencing and RW management  -OOB activity to maximize recovery including ambulating with nursing staff assistance and RW while in the hospital       AM-PAC 6 CLICK MOBILITY  Total Score:17     Patient left up in chair with all lines intact, call button in reach, RN notified, family present and setup for dinner.    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       Past Surgical History:   Procedure Laterality Date    CARDIAC CATHETERIZATION  01/2000    CATARACT EXTRACTION W/  INTRAOCULAR LENS IMPLANT Left 9/28/2020    Procedure: EXTRACTION, CATARACT, WITH IOL INSERTION;  Surgeon: Nam oMrin MD;  Location: [facility] OR;  Service: Ophthalmology;  Laterality: Left;    CATARACT EXTRACTION W/  INTRAOCULAR LENS IMPLANT Right 10/12/2020    Procedure: EXTRACTION, CATARACT, WITH IOL INSERTION;  Surgeon: Nam Morin MD;  [facility];  Service: Ophthalmology;  Laterality: Right;  laser    CHOLECYSTECTOMY      HERNIA REPAIR      HIP SURGERY Right 01/11/2017    THR    PROSTATE SURGERY      SHOULDER OPEN ROTATOR CUFF REPAIR      TONSILLECTOMY         Time Tracking:     PT Received On: 06/20/22  PT Start Time: 1637     PT Stop Time: 1704  PT Total Time (min): 27 min     Billable Minutes: Evaluation 10 and Gait Training 17      06/20/2022

## 2022-06-20 NOTE — TRANSFER OF CARE
"Anesthesia Transfer of Care Note    Patient: Herber Camarillo    Procedure(s) Performed: Procedure(s) (LRB):  ARTHROPLASTY, KNEE/ KAVON NEXGEN/ CEMENTED TIBIA (Left)    Patient location: PACU    Anesthesia Type: spinal    Transport from OR: Transported from OR on 6-10 L/min O2 by face mask with adequate spontaneous ventilation    Post pain: adequate analgesia    Post assessment: no apparent anesthetic complications    Post vital signs: stable    Level of consciousness: sedated    Nausea/Vomiting: no nausea/vomiting    Complications: none    Transfer of care protocol was followed      Last vitals:   Visit Vitals  BP (!) 152/83   Pulse (!) 55   Temp 36.6 °C (97.8 °F) (Oral)   Resp 20   Ht 5' 7" (1.702 m)   Wt 90.3 kg (199 lb)   SpO2 100%   BMI 31.17 kg/m²     "

## 2022-06-21 VITALS
TEMPERATURE: 97 F | SYSTOLIC BLOOD PRESSURE: 162 MMHG | HEIGHT: 67 IN | RESPIRATION RATE: 16 BRPM | WEIGHT: 199 LBS | HEART RATE: 74 BPM | DIASTOLIC BLOOD PRESSURE: 77 MMHG | OXYGEN SATURATION: 98 % | BODY MASS INDEX: 31.23 KG/M2

## 2022-06-21 PROCEDURE — 97110 THERAPEUTIC EXERCISES: CPT

## 2022-06-21 PROCEDURE — 25000003 PHARM REV CODE 250: Performed by: PHYSICIAN ASSISTANT

## 2022-06-21 PROCEDURE — 97530 THERAPEUTIC ACTIVITIES: CPT

## 2022-06-21 PROCEDURE — 99212 OFFICE O/P EST SF 10 MIN: CPT | Mod: ,,, | Performed by: PHYSICIAN ASSISTANT

## 2022-06-21 PROCEDURE — 97535 SELF CARE MNGMENT TRAINING: CPT

## 2022-06-21 PROCEDURE — 99900035 HC TECH TIME PER 15 MIN (STAT)

## 2022-06-21 PROCEDURE — 99212 PR OFFICE/OUTPT VISIT, EST, LEVL II, 10-19 MIN: ICD-10-PCS | Mod: ,,, | Performed by: PHYSICIAN ASSISTANT

## 2022-06-21 PROCEDURE — 94761 N-INVAS EAR/PLS OXIMETRY MLT: CPT

## 2022-06-21 PROCEDURE — 97116 GAIT TRAINING THERAPY: CPT

## 2022-06-21 PROCEDURE — 25000003 PHARM REV CODE 250: Performed by: STUDENT IN AN ORGANIZED HEALTH CARE EDUCATION/TRAINING PROGRAM

## 2022-06-21 RX ORDER — CELECOXIB 200 MG/1
200 CAPSULE ORAL DAILY
Status: DISCONTINUED | OUTPATIENT
Start: 2022-06-21 | End: 2022-06-21 | Stop reason: HOSPADM

## 2022-06-21 RX ADMIN — Medication 2 G: at 03:06

## 2022-06-21 RX ADMIN — CELECOXIB 200 MG: 200 CAPSULE ORAL at 09:06

## 2022-06-21 RX ADMIN — METOPROLOL SUCCINATE 25 MG: 25 TABLET, EXTENDED RELEASE ORAL at 09:06

## 2022-06-21 RX ADMIN — ACETAMINOPHEN 1000 MG: 500 TABLET ORAL at 06:06

## 2022-06-21 RX ADMIN — FAMOTIDINE 20 MG: 20 TABLET, FILM COATED ORAL at 09:06

## 2022-06-21 RX ADMIN — ACETAMINOPHEN 1000 MG: 500 TABLET ORAL at 12:06

## 2022-06-21 RX ADMIN — MUPIROCIN 1 G: 20 OINTMENT TOPICAL at 09:06

## 2022-06-21 RX ADMIN — METHOCARBAMOL 750 MG: 750 TABLET ORAL at 09:06

## 2022-06-21 RX ADMIN — ATORVASTATIN CALCIUM 20 MG: 20 TABLET, FILM COATED ORAL at 09:06

## 2022-06-21 RX ADMIN — ASPIRIN 81 MG: 81 TABLET, COATED ORAL at 09:06

## 2022-06-21 RX ADMIN — SODIUM CHLORIDE: 0.9 INJECTION, SOLUTION INTRAVENOUS at 03:06

## 2022-06-21 NOTE — PT/OT/SLP PROGRESS
"Occupational Therapy   Treatment/Discharge    Name: Herber Camarillo  MRN: [id number]  Admitting Diagnosis:  Primary osteoarthritis of left knee  1 Day Post-Op    Recommendations:     Discharge Recommendations: home  Discharge Equipment Recommendations:  bedside commode  Barriers to discharge:  None    Assessment:     Herber Camarillo is a 81 y.o. male with a medical diagnosis of Primary osteoarthritis of left knee.  Patient is s/p left TKA. He completed lower body dressing at supervision and further ADLs at supervision. He is appropriate for discharge home. Performance deficits affecting function are impaired functional mobilty, weakness, gait instability, impaired balance, impaired self care skills, decreased lower extremity function, decreased ROM.     Rehab Prognosis:  Good; patient would benefit from acute skilled OT services to address these deficits and reach maximum level of function.       Plan:     · DC from OT    Subjective     "I am ready to go"     Pain/Comfort:  · Pain Rating 1: 3/10  · Location - Side 1: Left  · Location - Orientation 1: generalized  · Location 1: knee  · Pain Addressed 1: Pre-medicate for activity, Reposition, Distraction    Objective:     Communicated with: RN prior to session.  Patient found up in chair with perineural catheter, cryotherapy, FCD (NImbus) upon OT entry to room.    General Precautions: Standard, fall   Orthopedic Precautions:LLE weight bearing as tolerated   Braces: N/A  Respiratory Status: Room air     Occupational Performance:     Functional Mobility/Transfers:  · Patient completed Sit <> Stand Transfer with supervision  with  rolling walker   · Patient completed Toilet Transfer Step Transfer technique with supervision with  rolling walker onto regular toilet   · Functional Mobility: patient ambulated to/from bathroom with use of rolling walker at supervision     Activities of Daily Living:  · Grooming: supervision standing at sink to wash hands, brush teeth and wash face "   · Upper Body Dressing: modified independence sitting in chair to don button up shirt   · Lower Body Dressing: supervision sitting in chair to don underwear/shorts, doff/fernie socks and don shoes       [facility] 6 Click ADL: 19    Treatment & Education:  -Patient educated to dress surgical side first and further dressing techniques s/p surgery   -Patient educated on hand placement for transfers   -Patient educated on rolling walker placement for ADLs  -Patient educated on proper foot wear s/p surgery   -Patient educated on car transfers s/p surgery  -Patient educated on ice pack   -Patient educated on role OT and plan of care s/p surgery at [facility], white board updated as needed       Patient left up in chair with call button in reach, RN notified, wife present and Nimbus intact and ice pack donned Education:      GOALS:   Multidisciplinary Problems     Occupational Therapy Goals     Not on file          Multidisciplinary Problems (Resolved)        Problem: Occupational Therapy    Goal Priority Disciplines Outcome Interventions   Occupational Therapy Goal   (Resolved)     OT, PT/OT Met    Description: Goals to be met by: 6/24/22    Patient will increase functional independence with ADLs by performing:    UE Dressing with Modified [place].  LE Dressing with Supervision.  Grooming while standing at sink with Supervision.  Toileting from toilet with Supervision for hygiene and clothing management.   Toilet transfer to toilet with Supervision.                     Time Tracking:     OT Date of Treatment: 06/21/22  OT Start Time: 0827  OT Stop Time: 0907  OT Total Time (min): 40 min    Billable Minutes:Self Care/Home Management 40               6/21/2022

## 2022-06-21 NOTE — ADDENDUM NOTE
Addendum  created 06/21/22 0907 by Brigid Cassidy MD    Charge Capture section accepted, Cosign clinical note with attestation

## 2022-06-21 NOTE — NURSING
[facility] Pump teaching done w/patient & patients wife.Instructed to remove on day 5, 6/24 at 12 noon or when pump alarms infusion complete. Demonstrated and explained how to remove catheter.Pt and pts   wife  verbalized understanding.  All questions answered. [facility] Pump contract provided, home care instxn pamphlet, home care supplies provided to patient, placed in discharge folder. Encouraged to contact anesthesia using # on brochure with any questions/concerns re: pain, side effects.. Instructed to call [facility] for any pump related issues. Informed that pt/fmly will receive follow up calls.

## 2022-06-21 NOTE — PROGRESS NOTES
[facility])  Orthopedics  Progress Note    Patient Name: Herber Camarillo  MRN: [id number]  Admission Date: 6/20/2022  Hospital Length of Stay: 0 days  Attending Provider: Rodolfo Nguyen MD  Primary Care Provider: Fidelina Loyola MD  Follow-up For: Procedure(s) (LRB):  ARTHROPLASTY, KNEE/ KAVON NEXGEN/ CEMENTED TIBIA (Left)    Post-Operative Day: 1 Day Post-Op  Subjective:     Principal Problem:Primary osteoarthritis of left knee    Principal Orthopedic Problem: same    Interval History: Patient examined at the bedside. NAEON. Pain controlled. Tolerating PO w/out N/V and voiding appropriately.   Ambulated 60 ft with PT yesterday.    Review of patient's allergies indicates:   Allergen Reactions    Novocain [procaine] Shortness Of Breath             Current Facility-Administered Medications   Medication    0.9%  NaCl infusion    acetaminophen tablet 1,000 mg    aspirin EC tablet 81 mg    atorvastatin tablet 20 mg    bisacodyL suppository 10 mg    celecoxib capsule 200 mg    famotidine tablet 20 mg    methocarbamoL tablet 750 mg    metoprolol succinate (TOPROL-XL) 24 hr tablet 25 mg    morphine injection 2 mg    mupirocin 2 % ointment 1 g    naloxone 0.4 mg/mL injection 0.02 mg    ondansetron injection 4 mg    oxyCODONE immediate release tablet 5 mg    oxyCODONE immediate release tablet Tab 10 mg    polyethylene glycol packet 17 g    pregabalin capsule 75 mg    prochlorperazine injection Soln 5 mg    ropivacaine 0.2% Nimbus PainPRO Pump infusion 500 ML    senna-docusate 8.6-50 mg per tablet 1 tablet     Objective:     Vital Signs (Most Recent):  Temp: 98.2 °F (36.8 °C) (06/21/22 0409)  Pulse: 65 (06/21/22 0409)  Resp: 18 (06/21/22 0409)  BP: (!) 140/67 (06/21/22 0409)  SpO2: 97 % (06/21/22 0409)   Vital Signs (24h Range):  Temp:  [97.1 °F (36.2 °C)-98.4 °F (36.9 °C)] 98.2 °F (36.8 °C)  Pulse:  [49-68] 65  Resp:  [15-38] 18  SpO2:  [96 %-100 %] 97 %  BP: (116-176)/() 140/67  "    Weight: 90.3 kg (199 lb)  Height: 5' 7" (170.2 cm)  Body mass index is 31.17 kg/m².      Intake/Output Summary (Last 24 hours) at 6/21/2022 0556  Last data filed at 6/21/2022 0300  Gross per 24 hour   Intake 2000 ml   Output 1150 ml   Net 850 ml       Ortho/SPM Exam  AAOx4  NAD  Reg rate  No increased WOB    LLE  Dressing c/d/i  Polar ice in place  SILT T/SP/DP/[place]/Sa  Motor intact T/SP/DP  Palpable DP pulse  FCDs in place and functioning    Significant Labs: All pertinent labs within the past 24 hours have been reviewed.    Significant Imaging: I have reviewed all pertinent imaging results/findings.    Assessment/Plan:     * Primary osteoarthritis of left knee  81 y.o. male POD1 s/p L TKA    Pain control: multimodal per surgical home  PT/OT: WBAT LLE  DVT PPx: ASA 81mg BID, FCDs at all times when not ambulating  Abx: postop Ancef  Bulky dressing d/c this am    Dispo: d/c home with home pending PT          Marlon Matos MD  Orthopedics  [place] [facility]  "

## 2022-06-21 NOTE — PLAN OF CARE
POD 0 (Total Knee Replacement Pathway)  Maintain thrombosis prophylaxis  Outcome: Met  Patient is wearing FCDs while in bed or chair and they are functioning properly  Outcome: Met  Incentive spirometry at bedside and patient has been instructed in its use  Outcome: Met  Polar Ice is applied and functioning properly  Outcome: Met  Patient expresses satisfaction with nausea control  Outcome: Met  Patient is wearing fall risk band  Outcome: Met  Patient must void prior to discharge  Outcome: Met     Problem: Adult Inpatient Plan of Care  Goal: Plan of Care Review  Outcome: Ongoing, Progressing  Goal: Patient-Specific Goal (Individualized)  Outcome: Ongoing, Progressing  Goal: Absence of Hospital-Acquired Illness or Injury  Outcome: Ongoing, Progressing  Goal: Optimal Comfort and Wellbeing  Outcome: Ongoing, Progressing  Goal: Readiness for Transition of Care  Outcome: Ongoing, Progressing     Problem: Pain Acute  Goal: Acceptable Pain Control and Functional Ability  Outcome: Ongoing, Progressing       Problem: Postoperative Urinary Retention (Knee Arthroplasty)  Goal: Effective Urinary Elimination  Outcome: Ongoing, Progressing     Pt remained free from falls or injuries this shift. Pt independent in repositioning. No skin breakdown noticed. Pt rested well through the night.  Pain controlled w ice packs and prn oxy x1. Pt ambulating and voiding well

## 2022-06-21 NOTE — PLAN OF CARE
Problem: Occupational Therapy  Goal: Occupational Therapy Goal  Description: Goals to be met by: 6/24/22    Patient will increase functional independence with ADLs by performing:    UE Dressing with Modified [place].  LE Dressing with Supervision.  Grooming while standing at sink with Supervision.  Toileting from toilet with Supervision for hygiene and clothing management.   Toilet transfer to toilet with Supervision.    Outcome: Met    OT goals met for discharge home

## 2022-06-21 NOTE — SUBJECTIVE & OBJECTIVE
"Principal Problem:Primary osteoarthritis of left knee    Principal Orthopedic Problem: same    Interval History: Patient examined at the bedside. NAEON. Pain controlled. Tolerating PO w/out N/V and voiding appropriately.   Ambulated 60 ft with PT yesterday.    Review of patient's allergies indicates:   Allergen Reactions    Novocain [procaine] Shortness Of Breath             Current Facility-Administered Medications   Medication    0.9%  NaCl infusion    acetaminophen tablet 1,000 mg    aspirin EC tablet 81 mg    atorvastatin tablet 20 mg    bisacodyL suppository 10 mg    celecoxib capsule 200 mg    famotidine tablet 20 mg    methocarbamoL tablet 750 mg    metoprolol succinate (TOPROL-XL) 24 hr tablet 25 mg    morphine injection 2 mg    mupirocin 2 % ointment 1 g    naloxone 0.4 mg/mL injection 0.02 mg    ondansetron injection 4 mg    oxyCODONE immediate release tablet 5 mg    oxyCODONE immediate release tablet Tab 10 mg    polyethylene glycol packet 17 g    pregabalin capsule 75 mg    prochlorperazine injection Soln 5 mg    ropivacaine 0.2% Nimbus PainPRO Pump infusion 500 ML    senna-docusate 8.6-50 mg per tablet 1 tablet     Objective:     Vital Signs (Most Recent):  Temp: 98.2 °F (36.8 °C) (06/21/22 0409)  Pulse: 65 (06/21/22 0409)  Resp: 18 (06/21/22 0409)  BP: (!) 140/67 (06/21/22 0409)  SpO2: 97 % (06/21/22 0409)   Vital Signs (24h Range):  Temp:  [97.1 °F (36.2 °C)-98.4 °F (36.9 °C)] 98.2 °F (36.8 °C)  Pulse:  [49-68] 65  Resp:  [15-38] 18  SpO2:  [96 %-100 %] 97 %  BP: (116-176)/() 140/67     Weight: 90.3 kg (199 lb)  Height: 5' 7" (170.2 cm)  Body mass index is 31.17 kg/m².      Intake/Output Summary (Last 24 hours) at 6/21/2022 0556  Last data filed at 6/21/2022 0300  Gross per 24 hour   Intake 2000 ml   Output 1150 ml   Net 850 ml       Ortho/SPM Exam  AAOx4  NAD  Reg rate  No increased WOB    LLE  Dressing c/d/i  Polar ice in place  SILT T/SP/DP/[place]/Sa  Motor intact T/SP/DP  Palpable DP pulse  FCDs " in place and functioning    Significant Labs: All pertinent labs within the past 24 hours have been reviewed.    Significant Imaging: I have reviewed all pertinent imaging results/findings.

## 2022-06-21 NOTE — DISCHARGE SUMMARY
[facility]  Orthopedics  Discharge Summary      Patient Name: Herber Camarillo  MRN: [id number]  Admission Date: 6/20/2022  Hospital Length of Stay: 0 days  Discharge Date and Time: 06/21/2022  Attending Physician: Rodolfo Nguyen MD   Discharging Provider: Marlon Matos MD  Primary Care Provider: Fidelina Loyola MD    HPI:   CC:  Left knee pain     Herber Camarillo is a 81 y.o. male with history of Left knee pain. Pain is worse with activity and weight bearing.  Patient has experienced interference of activities of daily living due to decreased range of motion and an increase in joint pain and swelling.  Patient has failed non-operative treatment including NSAIDs, corticosteroid injections, viscosupplement injections, and activity modification.  Herber Camarillo currently ambulates using assistive device .      Relevant medical conditions of significance in perioperative period:  Obesity:  BMI 32  CAD:presence of stents, on asa   HTN: on meds      Procedure(s) (LRB):  ARTHROPLASTY, KNEE/ KAVON NEXGEN/ CEMENTED TIBIA (Left)      Hospital Course:  On 6/20/22, the patient arrived to the [facility] for proper pre-operative management.  Upon completion of pre-operative preparation, the patient was taken back to the operative theatre. L TKA was performed without complication and the patient was transported to the post anesthesia care unit in stable condition.  After appropriate recovery from the anaesthetic agents used during the surgery, the patient was then transported to the hospital inpatient floor.  The interim of the hospital stay from arrival on the floor up to discharge has been uncomplicated. The patient has tolerated regular diet.  The patient's pain has been controlled using a multimodal approach. Currently, the patient's pain is well controlled on an oral regimen.  The patient has been voiding without difficulty.  The patient began participation in physical therapy after surgery  and has progressed throughout the entire hospital stay.  Currently, the patient's progress is sufficient to allow the them to be discharged to home safely.  The patient agrees with this assessment and desires a discharge today.      Goals of Care Treatment Preferences:         Consults (From admission, onward)        Status Ordering Provider     Inpatient consult to Social Work  Once        Provider:  (Not yet assigned)    Acknowledged RADU MATTHEWS     Inpatient consult to Pain Management  Once        Provider:  (Not yet assigned)    Acknowledged RADU MATTHEWS     Inpatient consult to Respiratory Care  Once        Provider:  (Not yet assigned)    Acknowledged RADU MATTHEWS            Pending Diagnostic Studies:     None        Final Active Diagnoses:    Diagnosis Date Noted POA    PRINCIPAL PROBLEM:  Primary osteoarthritis of left knee [M17.12] 01/07/2022 Yes      Problems Resolved During this Admission:      Discharged Condition: good    Disposition: Home or Self Care    Follow Up:    Patient Instructions:      Activity as tolerated     Sponge bath only until clinic visit     Keep surgical extremity elevated     Lifting restrictions   Order Comments: No strenuous exercise or lifting of > 10 lbs     Weight bearing as tolerated     No driving, operating heavy equipment or signing legal documents while taking pain medication     Leave dressing on - Keep it clean, dry, and intact until clinic visit   Order Comments: Do not remove surgical dressing for 2 weeks post-op. This will be done only by MD at initial post-op visit. If dressing is completely saturated, replace with identical dressing - silver-impregnated hydrocolloid dressing.     Do not get dressings wet. Do not shower.     If dressing continues to be saturated or there are signs of infection, please call Ortho Clinic [phone] for further instructions and to make appt to be seen.     Call MD for:  temperature >100.4     Call MD for:   persistent nausea and vomiting     Call MD for:  severe uncontrolled pain     Call MD for:  difficulty breathing, headache or visual disturbances     Call MD for:  redness, tenderness, or signs of infection (pain, swelling, redness, odor or green/yellow discharge around incision site)     Call MD for:  hives     Call MD for:  persistent dizziness or light-headedness     Call MD for:  extreme fatigue     Medications:  Reconciled Home Medications:      Medication List      START taking these medications    acetaminophen 650 MG Tbsr  Commonly known as: TYLENOL  Take 1 tablet (650 mg total) by mouth every 8 (eight) hours.  Replaces: TYLENOL ORAL     aspirin 81 MG EC tablet  Commonly known as: ECOTRIN  Take 1 tablet (81 mg total) by mouth 2 (two) times a day.     docusate sodium 100 MG capsule  Commonly known as: COLACE  Take 1 capsule (100 mg total) by mouth 2 (two) times daily.     meloxicam 15 MG tablet  Commonly known as: MOBIC  Take 1 tablet (15 mg total) by mouth once daily.     methocarbamoL 750 MG Tab  Commonly known as: ROBAXIN  Take 1 tablet (750 mg total) by mouth 4 (four) times daily as needed (muscle spasms).     oxyCODONE 5 MG immediate release tablet  Commonly known as: ROXICODONE  Take 1-2 tabs every 4-6 hours as needed for pain     pregabalin 75 MG capsule  Commonly known as: LYRICA  Take 1 capsule (75 mg total) by mouth 2 (two) times daily.        CONTINUE taking these medications    atorvastatin 20 MG tablet  Commonly known as: LIPITOR  Take 1 tablet (20 mg total) by mouth once daily.     b complex vitamins tablet  Take 1 tablet by mouth once daily.     metoprolol succinate 25 MG 24 hr tablet  Commonly known as: TOPROL-XL  Take 1 tablet (25 mg total) by mouth once daily.     omega-3 fatty acids-vitamin E 1,000 mg Cap  Take by mouth.        STOP taking these medications    ibuprofen 200 MG tablet  Commonly known as: ADVIL,MOTRIN     TYLENOL ORAL  Replaced by: acetaminophen 650 MG Tbsr            Marlon  MEGHAN Matos MD  Orthopedics  [facility]

## 2022-06-21 NOTE — PLAN OF CARE
Problem: Adult Inpatient Plan of Care  Goal: Plan of Care Review  Outcome: Ongoing, Progressing  Goal: Patient-Specific Goal (Individualized)  Outcome: Ongoing, Progressing  Goal: Absence of Hospital-Acquired Illness or Injury  Outcome: Ongoing, Progressing  Goal: Optimal Comfort and Wellbeing  Outcome: Ongoing, Progressing  Goal: Readiness for Transition of Care  Outcome: Ongoing, Progressing     Problem: Pain Acute  Goal: Acceptable Pain Control and Functional Ability  Outcome: Ongoing, Progressing     Problem: Adjustment to Surgery (Knee Arthroplasty)  Goal: Optimal Coping  Outcome: Ongoing, Progressing     Problem: Neurovascular Compromise (Knee Arthroplasty)  Goal: Intact Neurovascular Status  Outcome: Ongoing, Progressing     Problem: Ongoing Anesthesia Effects (Knee Arthroplasty)  Goal: Anesthesia/Sedation Recovery  Outcome: Ongoing, Progressing

## 2022-06-21 NOTE — PT/OT/SLP PROGRESS
"Physical Therapy Treatment and Discharge     Patient Name:  Herber Camarillo   MRN:  [id number]    Recommendations:     Discharge Recommendations:  outpatient PT - staff notified that PT appointment not scheduled till 7/6/2022  Discharge Equipment Recommendations: bedside commode   Barriers to discharge: None    Assessment:     Herber Camarillo is a 81 y.o. male admitted with a medical diagnosis of Primary osteoarthritis of left knee.  He presents with the following impairments/functional limitations:  weakness, impaired functional mobilty, gait instability, impaired balance, decreased lower extremity function, pain, decreased ROM, impaired skin, orthopedic precautions. Patient tolerated PT session well. Patient ambulated 150ft x2 trials with RW and supervision. No LOB or SOB noted. Patient ascended/descended 6" curb step forward and backward with RW and contact guard assistance. Patient performed L LE therex x10 reps. Patient has an OPPT appointment on 7/6/2022. Patient ready to discharge home from PT standpoint.     Rehab Prognosis: Good; patient would benefit from acute skilled PT services to address these deficits and reach maximum level of function.    Recent Surgery: Procedure(s) (LRB):  ARTHROPLASTY, KNEE/ KAVON NEXGEN/ CEMENTED TIBIA (Left) 1 Day Post-Op    Plan:     During this hospitalization, patient to be seen daily to address the identified rehab impairments via gait training, therapeutic exercises, therapeutic activities and progress toward the following goals:    · Plan of Care Expires:  06/24/22    Subjective     Chief Complaint: Left knee pain.   Patient/Family Comments/goals: To get back in his garden.  Pain/Comfort:  · Pain Rating 1:  (yes but did not rate with number)  · Location - Side 1: Left  · Location 1: knee  · Pain Addressed 1: Pre-medicate for activity, Reposition, Distraction      Objective:     Communicated with RN prior to session.  Patient found up in chair with perineural catheter, cryotherapy, " "FCD ([facility]) upon PT entry to room. Wife present throughout PT session.     General Precautions: Standard, fall   Orthopedic Precautions:LLE weight bearing as tolerated   Braces: N/A  Respiratory Status: Room air     Functional Mobility:  · Bed Mobility:     · Supine to Sit: supervision  · Sit to Supine: supervision  · Transfers:     · Sit to Stand:  supervision with rolling walker x1 from bedside chair and x1 from mat   · Gait: Patient ambulated 150ft x2 trials with Rolling Walker and supervision using 3-point gait. Patient demonstrated decreased archie and decreased step length during gait due to pain, decreased ROM, and decreased strength.  · Stairs: Patient ascended/descended 6" curb step forward and backward (to simulate stepping up on running board of truck) with RW and contact guard assistance.      AM-PAC 6 CLICK MOBILITY  Turning over in bed (including adjusting bedclothes, sheets and blankets)?: 4  Sitting down on and standing up from a chair with arms (e.g., wheelchair, bedside commode, etc.): 4  Moving from lying on back to sitting on the side of the bed?: 4  Moving to and from a bed to a chair (including a wheelchair)?: 4  Need to walk in hospital room?: 4  Climbing 3-5 steps with a railing?: 3  Basic Mobility Total Score: 23       Therapeutic Activities and Exercises:  Patient educated in and performed L LE exercises x10 reps for ankle pumps, quad set, glute set, SAQ over bolster, heel slides, hip abd/add, SLR, and LAQ.      Patient and wife educated in:  -PT role and POC  -safety with transfers including hand placement  -gait sequencing and RW management  -OOB activity to maximize recovery including ambulating at home to prevent DVT   -truck transfer  -curb training  -HEP for therex at home with handout provided       Patient left up in chair with all lines intact, call button in reach, RN notified and wife present.    GOALS:   Multidisciplinary Problems     Physical Therapy Goals        Problem: " Physical Therapy    Goal Priority Disciplines Outcome Goal Variances Interventions   Physical Therapy Goal     PT, PT/OT Ongoing, Progressing     Description: Goals to be met by: 2022    Patient will increase functional independence with mobility by performin. Supine to sit with supervision  2. Sit to stand transfer with Supervision  3. Gait x200 feet with Supervision using Rolling Walker  4. Ascend/Descend 1 curb step with Stand by assistance using Rolling Walker  5. Lower extremity exercise program x30 reps per handout, with supervision                         Time Tracking:     PT Received On: 22  PT Start Time: 935     PT Stop Time: 1017  PT Total Time (min): 42 min     Billable Minutes: Gait Training 15, Therapeutic Activity 12, and Therapeutic Exercise 15    Treatment Type: Treatment  PT/PTA: PT       2022

## 2022-06-21 NOTE — PLAN OF CARE
06/20/22 1807   EVANS Message   Medicare Outpatient and Observation Notification regarding financial responsibility Given to patient/caregiver   Date EVANS was signed 06/20/22   Time EVANS was signed 1807

## 2022-06-21 NOTE — PROGRESS NOTES
Acute Pain Service and Perioperative Surgical Home Progress Note    HPI  Herber Camarillo is a 81 y.o., male,     Interval history      Surgery:  Procedure(s) (LRB):  ARTHROPLASTY, KNEE/ KAVON NEXGEN/ CEMENTED TIBIA (Left)    Post Op Day #: 1    Catheter type: Perineural Adductor Canal    Infusion type: Ropivacaine 0.2%, with a 5cc automatic bolus every 3 hours, combined with a 5 cc patient controlled bolus available m45mpnd    Problem List:    Active Hospital Problems    Diagnosis  POA    *Primary osteoarthritis of left knee [M17.12]  Yes      Resolved Hospital Problems   No resolved problems to display.       Subjective:       General appearance of alert, oriented, no complaints   Pain with rest: 5    Numbers   Pain with movement: 5    Numbers   Side Effects    1. Pruritis No    2. Nausea No    3. Motor Blockade Yes, 0=Ability to raise lower extremities off bed    4. Sedation Yes, 1=awake and alert    Schedule Medications:    acetaminophen  1,000 mg Oral Q6H    aspirin  81 mg Oral BID    atorvastatin  20 mg Oral Daily    famotidine  20 mg Oral BID    methocarbamoL  750 mg Oral TID    metoprolol succinate  25 mg Oral Daily    mupirocin  1 g Nasal BID    polyethylene glycol  17 g Oral Daily    pregabalin  75 mg Oral QHS    senna-docusate 8.6-50 mg  1 tablet Oral BID        Continuous Infusions:   sodium chloride 0.9% 150 mL/hr at 06/21/22 0306    ropivacaine (PF) 2 mg/ml (0.2%)          PRN Medications:  bisacodyL, morphine, naloxone, ondansetron, oxyCODONE, oxyCODONE, prochlorperazine       Antibiotics:  Antibiotics (From admission, onward)            Start     Stop Route Frequency Ordered    06/20/22 2100  mupirocin 2 % ointment 1 g         06/25 2059 Nasl 2 times daily 06/20/22 1612             Objective:     Catheter site clean, dry, intact          Vital Signs (Most Recent):  Temp: 98.2 °F (36.8 °C) (06/21/22 0409)  Pulse: 65 (06/21/22 0409)  Resp: 18 (06/21/22 0409)  BP: (!) 140/67 (06/21/22  0409)  SpO2: 97 % (06/21/22 0409) Vital Signs Range (Last 24H):  Temp:  [97.1 °F (36.2 °C)-98.4 °F (36.9 °C)]   Pulse:  [49-68]   Resp:  [15-38]   BP: (116-176)/()   SpO2:  [96 %-100 %]          I & O (Last 24H):    Intake/Output Summary (Last 24 hours) at 6/21/2022 0513  Last data filed at 6/21/2022 0300  Gross per 24 hour   Intake 2000 ml   Output 1150 ml   Net 850 ml       Physical Exam:    GA: Alert, comfortable, no acute distress.   Pulmonary: Clear to auscultation A/P/L. No wheezing, crackles, or rhonchi.  Cardiac: RRR S1 & S2 w/o rubs/murmurs/gallops.   Abdominal:Bowel sounds present. No tenderness to palpation or distension. No appreciable hepatosplenomegaly.   Skin: No jaundice, rashes, or visible lesions.         Laboratory:  CBC: No results for input(s): WBC, RBC, HGB, HCT, PLT, MCV, MCH, MCHC in the last 72 hours.    BMP: No results for input(s): NA, K, CO2, CL, BUN, CREATININE, GLU, MG, PHOS, CALCIUM in the last 72 hours.    No results for input(s): PT, INR, PROTIME, APTT in the last 72 hours.      Anticoagulant dose aspirin bid at 81 mg.    Assessment:         Pain control adequate    Plan:     1) Pain:    Adductor canal perineural catheter in place and infusing. Good level. Multimodal pain regimen with acetaminophen, Celebrex, Lyrica, and prn oxycodone given  Will continue to monitor. Plan to discharge with Nimbus pain pump on POD #1.   2)HLD : continue home regimen   3)CAD: continue home regimen   4) FEN/GI: Tolerating liquids, advance diet as tolerated. positive flatus. No BM.   5) Dispo: Pt working well with PT/OT. Case management and SW for placement. Plan for discharge POD #1.        Evaluator Laura Silva PA-C

## 2022-06-21 NOTE — PLAN OF CARE
[facility]  Discharge Final Note    Primary Care Provider: Fidelina Loyola MD    Expected Discharge Date: 6/21/2022    Final Discharge Note (most recent)     Final Note - 06/21/22 1153        Final Note    Assessment Type Final Discharge Note     Anticipated Discharge Disposition Home or Self Care     What phone number can be called within the next 1-3 days to see how you are doing after discharge? --   [phone]    Hospital Resources/Appts/Education Provided Appointments scheduled and added to AVS                 Important Message from Medicare            Future Appointments   Date Time Provider Department Center   6/22/2022  1:00 PM TELEMED NURSE, [facility] [address]   7/5/2022 10:45 AM Spring Coppell, NP [facility] [address]   7/6/2022 11:00 AM Paxton Cook, PT [facility] [place]     Patient discharged home to care of family on 6/21/22.

## 2022-06-22 ENCOUNTER — CLINICAL SUPPORT (OUTPATIENT)
Dept: ORTHOPEDICS | Facility: CLINIC | Age: 82
End: 2022-06-22
Payer: MEDICARE

## 2022-06-22 NOTE — PROGRESS NOTES
Established Patient - Audio Only Telehealth Visit     The patient location is: home  The chief complaint leading to consultation is: post-op DC f/u  Visit type: Virtual visit with audio only (telephone)  Total time spent with patient: 5 min       The reason for the audio only service rather than synchronous audio and video virtual visit was related to technical difficulties or patient preference/necessity.     Each patient to whom I provide medical services by telemedicine is:  (1) informed of the relationship between the physician and patient and the respective role of any other health care provider with respect to management of the patient; and (2) notified that they may decline to receive medical services by telemedicine and may withdraw from such care at any time. Patient verbally consented to receive this service via voice-only telephone call.       HPI: s/p knee     Assessment and plan:  See below    I called the patient today regarding his surgery with Dr. Rodolfo Nguyen . The patient had a left TKA on 6/20.     Pain Scale: 5 / 10. Pt states pain only when walking or doing exercises.    Any issues with Fever: No.    Any issues with medications (specifically DVT prophylaxis): No. ASA 81 BID    Any issues with bowel movements:  Passing jennifer: No.                                                                 Urination: No.                                                                 Constipation: No. last BM today    Completing at home exercises: Yes:     Any concerns regarding their dressing/bandage:  No.    Patient confirmed first OP-PT appointment:  [facility] on  6/23 at 2022.     Any other concerns: No.        The patient was informed that if they have any urgent issues with their bandage, medications or any other health concerns regarding their surgery to call the 24/7 Orthopedic Post-op Hot Line at [phone]. The patient was reminded that if they have any chest pain or shortness of breath to call  911 or go to the ER.    The patient verbalized understanding and does not have any other questions                                    This service was not originating from a related E/M service provided within the previous 7 days nor will  to an E/M service or procedure within the next 24 hours or my soonest available appointment.  Prevailing standard of care was able to be met in this audio-only visit.

## 2022-06-24 NOTE — ANESTHESIA POST-OP PAIN MANAGEMENT
Acute Pain Service Progress Note    6/24/2022 9415    Called and spoke with patient's daughter, Mayelin, regarding [facility] follow up. Reports Mr. Camarillo's pain remains well controlled with the infusion in addition to taking his multi modal medications. He has not needed to take any opioids since discharge. Denies s/s of local anesthetic toxicity. Dressing remains clean, dry, and intact with small amount of leaking at insertion site of nerve catheter. Instructed to re enforce dressing as needed. Reviewed removal process with daughter. Plans to discontinue the infusion by tomorrow at 12pm and remove PNC. All questions answered. Encouraged to contact the [facility] 24/7 support line at [phone] or the [facility] Anesthesia line at [phone] should any further assistance be needed. Verbalizes understanding.

## 2022-06-30 DIAGNOSIS — Z96.652 STATUS POST LEFT KNEE REPLACEMENT: Primary | ICD-10-CM

## 2022-07-01 ENCOUNTER — PATIENT MESSAGE (OUTPATIENT)
Dept: ORTHOPEDICS | Facility: CLINIC | Age: 82
End: 2022-07-01
Payer: MEDICARE

## 2022-07-03 ENCOUNTER — HOSPITAL ENCOUNTER (INPATIENT)
Facility: HOSPITAL | Age: 82
LOS: 6 days | Discharge: HOME | DRG: 390 | End: 2022-07-09
Attending: EMERGENCY MEDICINE | Admitting: STUDENT IN AN ORGANIZED HEALTH CARE EDUCATION/TRAINING PROGRAM
Payer: MEDICARE

## 2022-07-03 DIAGNOSIS — K56.609 SBO (SMALL BOWEL OBSTRUCTION): ICD-10-CM

## 2022-07-03 DIAGNOSIS — Z01.89 ENCOUNTER FOR IMAGING STUDY TO CONFIRM NASOGASTRIC (NG) TUBE PLACEMENT: ICD-10-CM

## 2022-07-03 DIAGNOSIS — K56.609 SMALL BOWEL OBSTRUCTION: Primary | ICD-10-CM

## 2022-07-03 LAB
ALBUMIN SERPL BCP-MCNC: 3.5 G/DL (ref 3.5–5.2)
ALP SERPL-CCNC: 101 U/L (ref 55–135)
ALT SERPL W/O P-5'-P-CCNC: 51 U/L (ref 10–44)
ANION GAP SERPL CALC-SCNC: 9 MMOL/L (ref 8–16)
AST SERPL-CCNC: 57 U/L (ref 10–40)
BASOPHILS # BLD AUTO: 0.03 K/UL (ref 0–0.2)
BASOPHILS NFR BLD: 0.2 % (ref 0–1.9)
BILIRUB SERPL-MCNC: 1.5 MG/DL (ref 0.1–1)
BILIRUB UR QL STRIP: NEGATIVE
BUN SERPL-MCNC: 13 MG/DL (ref 8–23)
BUN SERPL-MCNC: 14 MG/DL (ref 6–30)
CALCIUM SERPL-MCNC: 8.9 MG/DL (ref 8.7–10.5)
CHLORIDE SERPL-SCNC: 94 MMOL/L (ref 95–110)
CHLORIDE SERPL-SCNC: 97 MMOL/L (ref 95–110)
CLARITY UR REFRACT.AUTO: CLEAR
CO2 SERPL-SCNC: 27 MMOL/L (ref 23–29)
COLOR UR AUTO: ABNORMAL
CREAT SERPL-MCNC: 0.9 MG/DL (ref 0.5–1.4)
CREAT SERPL-MCNC: 1 MG/DL (ref 0.5–1.4)
DIFFERENTIAL METHOD: ABNORMAL
EOSINOPHIL # BLD AUTO: 0.1 K/UL (ref 0–0.5)
EOSINOPHIL NFR BLD: 0.4 % (ref 0–8)
ERYTHROCYTE [DISTWIDTH] IN BLOOD BY AUTOMATED COUNT: 12.5 % (ref 11.5–14.5)
EST. GFR  (AFRICAN AMERICAN): >60 ML/MIN/1.73 M^2
EST. GFR  (NON AFRICAN AMERICAN): >60 ML/MIN/1.73 M^2
GLUCOSE SERPL-MCNC: 100 MG/DL (ref 70–110)
GLUCOSE SERPL-MCNC: 108 MG/DL (ref 70–110)
GLUCOSE UR QL STRIP: NEGATIVE
HCT VFR BLD AUTO: 44.2 % (ref 40–54)
HCT VFR BLD CALC: 42 %PCV (ref 36–54)
HGB BLD-MCNC: 15.1 G/DL (ref 14–18)
HGB UR QL STRIP: NEGATIVE
IMM GRANULOCYTES # BLD AUTO: 0.06 K/UL (ref 0–0.04)
IMM GRANULOCYTES NFR BLD AUTO: 0.5 % (ref 0–0.5)
KETONES UR QL STRIP: ABNORMAL
LACTATE SERPL-SCNC: 1.6 MMOL/L (ref 0.5–2.2)
LEUKOCYTE ESTERASE UR QL STRIP: NEGATIVE
LIPASE SERPL-CCNC: 17 U/L (ref 4–60)
LYMPHOCYTES # BLD AUTO: 1 K/UL (ref 1–4.8)
LYMPHOCYTES NFR BLD: 7.6 % (ref 18–48)
MCH RBC QN AUTO: 31.6 PG (ref 27–31)
MCHC RBC AUTO-ENTMCNC: 34.2 G/DL (ref 32–36)
MCV RBC AUTO: 93 FL (ref 82–98)
MONOCYTES # BLD AUTO: 1.4 K/UL (ref 0.3–1)
MONOCYTES NFR BLD: 11.4 % (ref 4–15)
NEUTROPHILS # BLD AUTO: 9.9 K/UL (ref 1.8–7.7)
NEUTROPHILS NFR BLD: 79.9 % (ref 38–73)
NITRITE UR QL STRIP: NEGATIVE
NRBC BLD-RTO: 0 /100 WBC
PH UR STRIP: 5 [PH] (ref 5–8)
PLATELET # BLD AUTO: 316 K/UL (ref 150–450)
PMV BLD AUTO: 9.7 FL (ref 9.2–12.9)
POC IONIZED CALCIUM: 1.18 MMOL/L (ref 1.06–1.42)
POC TCO2 (MEASURED): 28 MMOL/L (ref 23–29)
POTASSIUM BLD-SCNC: 3.8 MMOL/L (ref 3.5–5.1)
POTASSIUM SERPL-SCNC: 4.7 MMOL/L (ref 3.5–5.1)
PROT SERPL-MCNC: 6.5 G/DL (ref 6–8.4)
PROT UR QL STRIP: NEGATIVE
RBC # BLD AUTO: 4.78 M/UL (ref 4.6–6.2)
SAMPLE: ABNORMAL
SODIUM BLD-SCNC: 135 MMOL/L (ref 136–145)
SODIUM SERPL-SCNC: 133 MMOL/L (ref 136–145)
SP GR UR STRIP: >1.03 (ref 1–1.03)
URN SPEC COLLECT METH UR: ABNORMAL
WBC # BLD AUTO: 12.42 K/UL (ref 3.9–12.7)

## 2022-07-03 PROCEDURE — 96361 HYDRATE IV INFUSION ADD-ON: CPT

## 2022-07-03 PROCEDURE — 80047 BASIC METABLC PNL IONIZED CA: CPT

## 2022-07-03 PROCEDURE — 99285 PR EMERGENCY DEPT VISIT,LEVEL V: ICD-10-PCS | Mod: CS,,, | Performed by: EMERGENCY MEDICINE

## 2022-07-03 PROCEDURE — 63600175 PHARM REV CODE 636 W HCPCS: Performed by: STUDENT IN AN ORGANIZED HEALTH CARE EDUCATION/TRAINING PROGRAM

## 2022-07-03 PROCEDURE — 80053 COMPREHEN METABOLIC PANEL: CPT | Performed by: STUDENT IN AN ORGANIZED HEALTH CARE EDUCATION/TRAINING PROGRAM

## 2022-07-03 PROCEDURE — 99285 EMERGENCY DEPT VISIT HI MDM: CPT | Mod: 25

## 2022-07-03 PROCEDURE — 25000003 PHARM REV CODE 250: Performed by: STUDENT IN AN ORGANIZED HEALTH CARE EDUCATION/TRAINING PROGRAM

## 2022-07-03 PROCEDURE — 83690 ASSAY OF LIPASE: CPT | Performed by: STUDENT IN AN ORGANIZED HEALTH CARE EDUCATION/TRAINING PROGRAM

## 2022-07-03 PROCEDURE — 99223 PR INITIAL HOSPITAL CARE,LEVL III: ICD-10-PCS | Mod: AI,,, | Performed by: STUDENT IN AN ORGANIZED HEALTH CARE EDUCATION/TRAINING PROGRAM

## 2022-07-03 PROCEDURE — 25500020 PHARM REV CODE 255: Performed by: EMERGENCY MEDICINE

## 2022-07-03 PROCEDURE — 99223 1ST HOSP IP/OBS HIGH 75: CPT | Mod: AI,,, | Performed by: STUDENT IN AN ORGANIZED HEALTH CARE EDUCATION/TRAINING PROGRAM

## 2022-07-03 PROCEDURE — 83605 ASSAY OF LACTIC ACID: CPT | Performed by: STUDENT IN AN ORGANIZED HEALTH CARE EDUCATION/TRAINING PROGRAM

## 2022-07-03 PROCEDURE — 85025 COMPLETE CBC W/AUTO DIFF WBC: CPT | Performed by: STUDENT IN AN ORGANIZED HEALTH CARE EDUCATION/TRAINING PROGRAM

## 2022-07-03 PROCEDURE — 11000001 HC ACUTE MED/SURG PRIVATE ROOM

## 2022-07-03 PROCEDURE — 96374 THER/PROPH/DIAG INJ IV PUSH: CPT

## 2022-07-03 PROCEDURE — 99285 EMERGENCY DEPT VISIT HI MDM: CPT | Mod: CS,,, | Performed by: EMERGENCY MEDICINE

## 2022-07-03 PROCEDURE — 81003 URINALYSIS AUTO W/O SCOPE: CPT | Performed by: STUDENT IN AN ORGANIZED HEALTH CARE EDUCATION/TRAINING PROGRAM

## 2022-07-03 RX ORDER — SODIUM CHLORIDE, SODIUM LACTATE, POTASSIUM CHLORIDE, CALCIUM CHLORIDE 600; 310; 30; 20 MG/100ML; MG/100ML; MG/100ML; MG/100ML
INJECTION, SOLUTION INTRAVENOUS CONTINUOUS
Status: DISCONTINUED | OUTPATIENT
Start: 2022-07-03 | End: 2022-07-08

## 2022-07-03 RX ORDER — ENOXAPARIN SODIUM 100 MG/ML
40 INJECTION SUBCUTANEOUS EVERY 24 HOURS
Status: DISCONTINUED | OUTPATIENT
Start: 2022-07-03 | End: 2022-07-09 | Stop reason: HOSPADM

## 2022-07-03 RX ORDER — HYDROMORPHONE HYDROCHLORIDE 1 MG/ML
0.5 INJECTION, SOLUTION INTRAMUSCULAR; INTRAVENOUS; SUBCUTANEOUS EVERY 4 HOURS PRN
Status: DISCONTINUED | OUTPATIENT
Start: 2022-07-03 | End: 2022-07-08

## 2022-07-03 RX ORDER — PROCHLORPERAZINE EDISYLATE 5 MG/ML
10 INJECTION INTRAMUSCULAR; INTRAVENOUS
Status: COMPLETED | OUTPATIENT
Start: 2022-07-03 | End: 2022-07-03

## 2022-07-03 RX ORDER — ONDANSETRON 2 MG/ML
4 INJECTION INTRAMUSCULAR; INTRAVENOUS
Status: COMPLETED | OUTPATIENT
Start: 2022-07-03 | End: 2022-07-03

## 2022-07-03 RX ORDER — HYDROMORPHONE HYDROCHLORIDE 1 MG/ML
1 INJECTION, SOLUTION INTRAMUSCULAR; INTRAVENOUS; SUBCUTANEOUS EVERY 4 HOURS PRN
Status: DISCONTINUED | OUTPATIENT
Start: 2022-07-03 | End: 2022-07-08

## 2022-07-03 RX ORDER — METOPROLOL TARTRATE 1 MG/ML
5 INJECTION, SOLUTION INTRAVENOUS EVERY 12 HOURS
Status: DISCONTINUED | OUTPATIENT
Start: 2022-07-03 | End: 2022-07-09 | Stop reason: HOSPADM

## 2022-07-03 RX ORDER — SODIUM CHLORIDE 0.9 % (FLUSH) 0.9 %
10 SYRINGE (ML) INJECTION
Status: DISCONTINUED | OUTPATIENT
Start: 2022-07-03 | End: 2022-07-09 | Stop reason: HOSPADM

## 2022-07-03 RX ORDER — ACETAMINOPHEN 325 MG/1
650 TABLET ORAL EVERY 8 HOURS PRN
Status: DISCONTINUED | OUTPATIENT
Start: 2022-07-03 | End: 2022-07-09 | Stop reason: HOSPADM

## 2022-07-03 RX ORDER — ONDANSETRON 2 MG/ML
4 INJECTION INTRAMUSCULAR; INTRAVENOUS EVERY 6 HOURS PRN
Status: DISCONTINUED | OUTPATIENT
Start: 2022-07-03 | End: 2022-07-09 | Stop reason: HOSPADM

## 2022-07-03 RX ADMIN — PROCHLORPERAZINE EDISYLATE 10 MG: 5 INJECTION INTRAMUSCULAR; INTRAVENOUS at 09:07

## 2022-07-03 RX ADMIN — ENOXAPARIN SODIUM 40 MG: 100 INJECTION SUBCUTANEOUS at 05:07

## 2022-07-03 RX ADMIN — METOROPROLOL TARTRATE 5 MG: 5 INJECTION, SOLUTION INTRAVENOUS at 08:07

## 2022-07-03 RX ADMIN — ONDANSETRON 4 MG: 2 INJECTION INTRAMUSCULAR; INTRAVENOUS at 02:07

## 2022-07-03 RX ADMIN — SODIUM CHLORIDE, SODIUM LACTATE, POTASSIUM CHLORIDE, AND CALCIUM CHLORIDE: .6; .31; .03; .02 INJECTION, SOLUTION INTRAVENOUS at 02:07

## 2022-07-03 RX ADMIN — IOHEXOL 100 ML: 350 INJECTION, SOLUTION INTRAVENOUS at 10:07

## 2022-07-03 NOTE — ASSESSMENT & PLAN NOTE
81 y.o. male with PMHx of HLD, hx of prostate cancer admitted with SBO    - Admit to gen surg  - NPO  - NGT to LIWS  - Likely gastrograffin challenge after 24 hrs of decompression  - Pain meds prn  - Antiemetics prn  - Encourage ambulation  - Home meds converted to IV  - Daily labs  - Please call with questions

## 2022-07-03 NOTE — HPI
Herber Camarillo is a 81 y.o. male with PMHx of HLD, hx of prostate cancer who presents to ED with abdominal pain and vomiting. This onset last night. He has vomited numerous times since then. His vomit is greenish. The pain is located in the epigastric area. He has never had similar symptoms previously. His last BM was yesterday. He did have recent L knee arthroplasty on 6/20/22, has not been on home narcotics.   Work up in ED demonstrates transition in pelvis with dilated stomach and small bowel. Labs unremarkable overall.   Prior abdominal surgeries include lap adam and open umbilical hernia repair.

## 2022-07-03 NOTE — SUBJECTIVE & OBJECTIVE
No current facility-administered medications on file prior to encounter.     Current Outpatient Medications on File Prior to Encounter   Medication Sig    acetaminophen (TYLENOL) 650 MG TbSR Take 1 tablet (650 mg total) by mouth every 8 (eight) hours.    aspirin (ECOTRIN) 81 MG EC tablet Take 1 tablet (81 mg total) by mouth 2 (two) times a day.    atorvastatin (LIPITOR) 20 MG tablet Take 1 tablet (20 mg total) by mouth once daily.    b complex vitamins tablet Take 1 tablet by mouth once daily.    docusate sodium (COLACE) 100 MG capsule Take 1 capsule (100 mg total) by mouth 2 (two) times daily.    meloxicam (MOBIC) 15 MG tablet Take 1 tablet (15 mg total) by mouth once daily.    metoprolol succinate (TOPROL-XL) 25 MG 24 hr tablet Take 1 tablet (25 mg total) by mouth once daily.    omega-3 fatty acids-vitamin E 1,000 mg Cap Take by mouth.    oxyCODONE (ROXICODONE) 5 MG immediate release tablet Take 1-2 tabs every 4-6 hours as needed for pain    pregabalin (LYRICA) 75 MG capsule Take 1 capsule (75 mg total) by mouth 2 (two) times daily.       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     Past Surgical History:   Procedure Laterality Date    CARDIAC CATHETERIZATION  01/2000    CATARACT EXTRACTION W/  INTRAOCULAR LENS IMPLANT Left 9/28/2020    Procedure: EXTRACTION, CATARACT, WITH IOL INSERTION;  Surgeon: Nam Morin MD;  Location: [facility] OR;   Service: Ophthalmology;  Laterality: Left;    CATARACT EXTRACTION W/  INTRAOCULAR LENS IMPLANT Right 10/12/2020    Procedure: EXTRACTION, CATARACT, WITH IOL INSERTION;  Surgeon: Nam Morin MD;  Location: [facility] OR;  Service: Ophthalmology;  Laterality: Right;  laser    CHOLECYSTECTOMY      HERNIA REPAIR      HIP SURGERY Right 01/11/2017    THR    KNEE ARTHROPLASTY Left 6/20/2022    Procedure: ARTHROPLASTY, KNEE/ KAVON NEXGEN/ CEMENTED TIBIA;  Surgeon: Rodolfo Nguyen MD;  Location: [facility] OR;  Service: Orthopedics;  Laterality: Left;    PROSTATE SURGERY      SHOULDER OPEN ROTATOR CUFF REPAIR      TONSILLECTOMY       Family History       Problem Relation (Age of Onset)    Cancer Sister    Heart disease Father, Brother, Brother, Mother    Hyperlipidemia Daughter    No Known Problems Son, Daughter          Tobacco Use    Smoking status: Never Smoker    Smokeless tobacco: Never Used   Substance and Sexual Activity    Alcohol use: No     Alcohol/week: 0.0 standard drinks    Drug use: No    Sexual activity: Not on file     Review of Systems   Constitutional:  Negative for chills and fever.   HENT:  Negative for hearing loss and voice change.    Eyes:  Negative for discharge and redness.   Respiratory:  Negative for cough and shortness of breath.    Cardiovascular:  Negative for chest pain.   Gastrointestinal:  Positive for abdominal distention, abdominal pain, nausea and vomiting. Negative for diarrhea.   Genitourinary:  Negative for difficulty urinating.   Musculoskeletal:  Negative for gait problem.   Skin:  Negative for pallor.   Neurological:  Negative for dizziness and weakness.   Psychiatric/Behavioral:  Negative for behavioral problems.    Objective:     Vital Signs (Most Recent):  Temp: 97.7 °F (36.5 °C) (07/03/22 0801)  Pulse: 86 (07/03/22 0801)  Resp: 16 (07/03/22 0801)  BP: 118/78 (07/03/22 0801)  SpO2: 97 % (07/03/22 0801) Vital Signs (24h Range):  Temp:  [97.7 °F (36.5 °C)] 97.7 °F (36.5 °C)  Pulse:  [86]  86  Resp:  [16] 16  SpO2:  [97 %] 97 %  BP: (118)/(78) 118/78     Weight: 86.2 kg (190 lb)  Body mass index is 29.76 kg/m².    Physical Exam  Vitals and nursing note reviewed.   Constitutional:       General: He is not in acute distress.     Appearance: He is well-developed. He is ill-appearing.   HENT:      Head: Normocephalic and atraumatic.   Eyes:      General:         Right eye: No discharge.         Left eye: No discharge.      Conjunctiva/sclera: Conjunctivae normal.   Cardiovascular:      Rate and Rhythm: Normal rate and regular rhythm.   Pulmonary:      Effort: Pulmonary effort is normal. No respiratory distress.   Abdominal:      General: There is distension.      Palpations: Abdomen is soft.      Tenderness: There is abdominal tenderness (mild epigastric).      Comments: Well-healed incisions   Musculoskeletal:         General: No deformity. Normal range of motion.      Cervical back: Normal range of motion and neck supple.      Comments: L knee with dressing in place   Skin:     General: Skin is warm and dry.   Neurological:      Mental Status: He is alert and oriented to person, place, and time.   Psychiatric:         Behavior: Behavior normal.       Significant Labs:  I have reviewed all pertinent lab results within the past 24 hours.  CBC:   Recent Labs   Lab 07/03/22 0918 07/03/22  0920   WBC 12.42  --    RBC 4.78  --    HGB 15.1  --    HCT 44.2 42     --    MCV 93  --    MCH 31.6*  --    MCHC 34.2  --      CMP:   Recent Labs   Lab 07/03/22  0918      CALCIUM 8.9   ALBUMIN 3.5   PROT 6.5   *   K 4.7   CO2 27   CL 97   BUN 13   CREATININE 0.9   ALKPHOS 101   ALT 51*   AST 57*   BILITOT 1.5*       Significant Diagnostics:  I have reviewed all pertinent imaging results/findings within the past 24 hours.

## 2022-07-03 NOTE — ED NOTES
Pt's first and last name and birthday confirmed.   LOC: The patient is awake, alert and aware of environment with an appropriate affect, the patient is oriented x 3 and speaking appropriately.  APPEARANCE: Patient resting comfortably and in no acute distress, patient is clean and well groomed.  SKIN: The skin is warm and dry, patient has normal skin turgor and moist mucus membranes, skin intact, no breakdown or brusing noted.  MUSKULOSKELETAL: Patient moving all extremities well, no obvious swelling or deformities noted.  RESPIRATORY: Airway is open and patent, respirations are spontaneous, patient has a normal effort and rate.   CARDIAC: Normal heart sounds. No peripheral edema.  ABDOMEN: Soft and tender to palpation, distention noted.   NEURO: No neuro deficits, hand grasp equal, no drift noted, no facial droop noted. Speech is clear.

## 2022-07-03 NOTE — CONSULTS
[facility] - Surgery  General Surgery  Consult Note    Patient Name: Herber Camarillo  MRN: [id number]  Code Status: Full Code  Admission Date: 7/3/2022  Hospital Length of Stay: 0 days  Attending Physician: Patric Lainez MD  Primary Care Provider: Fidelina Loyola MD    Patient information was obtained from patient, past medical records and ER records.     Inpatient consult to General Surgery  Consult performed by: Clay De Paz MD  Consult ordered by: Clay De Paz MD        Subjective:     Principal Problem: SBO (small bowel obstruction)    History of Present Illness: Herber Camarillo is a 81 y.o. male with PMHx of HLD, hx of prostate cancer who presents to ED with abdominal pain and vomiting. This onset last night. He has vomited numerous times since then. His vomit is greenish. The pain is located in the epigastric area. He has never had similar symptoms previously. His last BM was yesterday. He did have recent L knee arthroplasty on 6/20/22, has not been on home narcotics.   Work up in ED demonstrates transition in pelvis with dilated stomach and small bowel. Labs unremarkable overall.   Prior abdominal surgeries include lap adam and open umbilical hernia repair.             No current facility-administered medications on file prior to encounter.     Current Outpatient Medications on File Prior to Encounter   Medication Sig    acetaminophen (TYLENOL) 650 MG TbSR Take 1 tablet (650 mg total) by mouth every 8 (eight) hours.    aspirin (ECOTRIN) 81 MG EC tablet Take 1 tablet (81 mg total) by mouth 2 (two) times a day.    atorvastatin (LIPITOR) 20 MG tablet Take 1 tablet (20 mg total) by mouth once daily.    b complex vitamins tablet Take 1 tablet by mouth once daily.    docusate sodium (COLACE) 100 MG capsule Take 1 capsule (100 mg total) by mouth 2 (two) times daily.    meloxicam (MOBIC) 15 MG tablet Take 1 tablet (15 mg total) by mouth once daily.    metoprolol succinate (TOPROL-XL) 25 MG 24 hr tablet Take 1  tablet (25 mg total) by mouth once daily.    omega-3 fatty acids-vitamin E 1,000 mg Cap Take by mouth.    oxyCODONE (ROXICODONE) 5 MG immediate release tablet Take 1-2 tabs every 4-6 hours as needed for pain    pregabalin (LYRICA) 75 MG capsule Take 1 capsule (75 mg total) by mouth 2 (two) times daily.       Review of patient's allergies indicates:   Allergen Reactions    Novocain [procaine] Shortness Of Breath             Past Medical History:   Diagnosis Date    Arthritis     CAD (coronary artery disease) 10/30/2012    Cataract     Dermatochalasis of both upper eyelids     Fever blister     Glucose intolerance (impaired glucose tolerance) 7/24/2013    Heart attack     Hyperlipidemia 10/30/2012    Joint pain     Keloid cicatrix     Lung nodule, solitary 4/30/2015    Non morbid obesity due to excess calories 10/20/2015    Obesity     Overweight(278.02) 9/5/2013    Prostate cancer     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     Past Surgical History:   Procedure Laterality Date    CARDIAC CATHETERIZATION  01/2000    CATARACT EXTRACTION W/  INTRAOCULAR LENS IMPLANT Left 9/28/2020    Procedure: EXTRACTION, CATARACT, WITH IOL INSERTION;  Surgeon: Nam Morin MD;  Location: [facility] OR;  Service: Ophthalmology;  Laterality: Left;    CATARACT EXTRACTION W/  INTRAOCULAR LENS IMPLANT Right 10/12/2020    Procedure: EXTRACTION, CATARACT, WITH IOL INSERTION;  Surgeon: Nam Morin MD;  Location: [facility] OR;  Service: Ophthalmology;  Laterality: Right;  laser    CHOLECYSTECTOMY      HERNIA REPAIR      HIP SURGERY Right 01/11/2017    THR    KNEE ARTHROPLASTY Left 6/20/2022    Procedure: ARTHROPLASTY, KNEE/ KAVON NEXGEN/ CEMENTED TIBIA;  Surgeon: Rodolfo Nguyen MD;  Location: [facility] OR;  Service: Orthopedics;  Laterality: Left;    PROSTATE SURGERY      SHOULDER OPEN ROTATOR CUFF REPAIR      TONSILLECTOMY       Family History       Problem Relation (Age of Onset)    Cancer Sister    Heart disease Father,  Brother, Brother, Mother    Hyperlipidemia Daughter    No Known Problems Son, Daughter          Tobacco Use    Smoking status: Never Smoker    Smokeless tobacco: Never Used   Substance and Sexual Activity    Alcohol use: No     Alcohol/week: 0.0 standard drinks    Drug use: No    Sexual activity: Not on file     Review of Systems   Constitutional:  Negative for chills and fever.   HENT:  Negative for hearing loss and voice change.    Eyes:  Negative for discharge and redness.   Respiratory:  Negative for cough and shortness of breath.    Cardiovascular:  Negative for chest pain.   Gastrointestinal:  Positive for abdominal distention, abdominal pain, nausea and vomiting. Negative for diarrhea.   Genitourinary:  Negative for difficulty urinating.   Musculoskeletal:  Negative for gait problem.   Skin:  Negative for pallor.   Neurological:  Negative for dizziness and weakness.   Psychiatric/Behavioral:  Negative for behavioral problems.    Objective:     Vital Signs (Most Recent):  Temp: 97.7 °F (36.5 °C) (07/03/22 0801)  Pulse: 86 (07/03/22 0801)  Resp: 16 (07/03/22 0801)  BP: 118/78 (07/03/22 0801)  SpO2: 97 % (07/03/22 0801) Vital Signs (24h Range):  Temp:  [97.7 °F (36.5 °C)] 97.7 °F (36.5 °C)  Pulse:  [86] 86  Resp:  [16] 16  SpO2:  [97 %] 97 %  BP: (118)/(78) 118/78     Weight: 86.2 kg (190 lb)  Body mass index is 29.76 kg/m².    Physical Exam  Vitals and nursing note reviewed.   Constitutional:       General: He is not in acute distress.     Appearance: He is well-developed. He is ill-appearing.   HENT:      Head: Normocephalic and atraumatic.   Eyes:      General:         Right eye: No discharge.         Left eye: No discharge.      Conjunctiva/sclera: Conjunctivae normal.   Cardiovascular:      Rate and Rhythm: Normal rate and regular rhythm.   Pulmonary:      Effort: Pulmonary effort is normal. No respiratory distress.   Abdominal:      General: There is distension.      Palpations: Abdomen is soft.       Tenderness: There is abdominal tenderness (mild epigastric).      Comments: Well-healed incisions   Musculoskeletal:         General: No deformity. Normal range of motion.      Cervical back: Normal range of motion and neck supple.      Comments: L knee with dressing in place   Skin:     General: Skin is warm and dry.   Neurological:      Mental Status: He is alert and oriented to person, place, and time.   Psychiatric:         Behavior: Behavior normal.       Significant Labs:  I have reviewed all pertinent lab results within the past 24 hours.  CBC:   Recent Labs   Lab 07/03/22 0918 07/03/22 0920   WBC 12.42  --    RBC 4.78  --    HGB 15.1  --    HCT 44.2 42     --    MCV 93  --    MCH 31.6*  --    MCHC 34.2  --      CMP:   Recent Labs   Lab 07/03/22 0918      CALCIUM 8.9   ALBUMIN 3.5   PROT 6.5   *   K 4.7   CO2 27   CL 97   BUN 13   CREATININE 0.9   ALKPHOS 101   ALT 51*   AST 57*   BILITOT 1.5*       Significant Diagnostics:  I have reviewed all pertinent imaging results/findings within the past 24 hours.      Assessment/Plan:     * SBO (small bowel obstruction)  81 y.o. male with PMHx of HLD, hx of prostate cancer admitted with SBO    - Admit to gen surg  - NPO  - NGT to LIWS  - Likely gastrograffin challenge after 24 hrs of decompression  - Pain meds prn  - Antiemetics prn  - Encourage ambulation  - Home meds converted to IV  - Daily labs  - Please call with questions      VTE Risk Mitigation (From admission, onward)         Ordered     enoxaparin injection 40 mg  Daily         07/03/22 1248     IP VTE HIGH RISK PATIENT  Once         07/03/22 1248     Place sequential compression device  Until discontinued         07/03/22 1248                    Clay De Paz MD  General Surgery  [facility] - Surgery

## 2022-07-03 NOTE — ED PROVIDER NOTES
"Encounter Date: 7/3/2022       History     Chief Complaint   Patient presents with    Abdominal Pain    Nausea    Vomiting     Pt reports symptoms began 2 days ago.     Diarrhea     80yo M with PMH of HLD, osteoarthritis of right knee s/p arthroplasty (6/20/22) presenting to ED with complaint of diarrhea and vomiting.  Patient reports he began to have loose brown stools 2 and half days ago.  He is having approximately 2 episodes per day.  Last night, he began to experience nausea and vomiting.  He describes the vomiting as brown and grindy".  He also reports abdominal tightness.  His last bowel movement occurred last night.  He is unsure if he has been passing flatus today.  He reports EMS gave him medicine for nausea which has helped.  He denies fevers, chills, cough, congestion, blood in stool, chest pain, shortness of breath, knee pain or redness.        Review of patient's allergies indicates:   Allergen Reactions    Novocain [procaine] Shortness Of Breath           Past Medical History:   Diagnosis Date    Arthritis     CAD (coronary artery disease) 10/30/2012    Cataract     Dermatochalasis of both upper eyelids     Fever blister     Glucose intolerance (impaired glucose tolerance) 7/24/2013    Heart attack     Hyperlipidemia 10/30/2012    Joint pain     Keloid cicatrix     Lung nodule, solitary 4/30/2015    Non morbid obesity due to excess calories 10/20/2015    Obesity     Overweight(278.02) 9/5/2013    Prostate cancer     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     Past Surgical History:   Procedure Laterality Date    CARDIAC CATHETERIZATION  01/2000    CATARACT EXTRACTION W/  INTRAOCULAR LENS IMPLANT Left 9/28/2020    Procedure: EXTRACTION, CATARACT, WITH IOL INSERTION;  Surgeon: Nam Morin MD;  Location: [facility];  Service: Ophthalmology;  Laterality: Left;    CATARACT EXTRACTION W/  INTRAOCULAR LENS IMPLANT Right 10/12/2020    Procedure: EXTRACTION, CATARACT, WITH IOL INSERTION; "  Surgeon: Nam Morin MD;  Location: [facility], operated by [facility] OR;  Service: Ophthalmology;  Laterality: Right;  laser    CHOLECYSTECTOMY      HERNIA REPAIR      HIP SURGERY Right 01/11/2017    THR    KNEE ARTHROPLASTY Left 6/20/2022    Procedure: ARTHROPLASTY, KNEE/ KAVON NEXGEN/ CEMENTED TIBIA;  Surgeon: Rodolfo Nguyen MD;  Location: [facility] OR;  Service: Orthopedics;  Laterality: Left;    PROSTATE SURGERY      SHOULDER OPEN ROTATOR CUFF REPAIR      TONSILLECTOMY       Family History   Problem Relation Age of Onset    Heart disease Father     Cancer Sister         Lung    Heart disease Brother     Heart disease Brother     Heart disease Mother     Hyperlipidemia Daughter     No Known Problems Son     No Known Problems Daughter     Amblyopia Neg Hx     Blindness Neg Hx     Cataracts Neg Hx     Diabetes Neg Hx     Glaucoma Neg Hx     Hypertension Neg Hx     Macular degeneration Neg Hx     Retinal detachment Neg Hx     Strabismus Neg Hx     Stroke Neg Hx     Thyroid disease Neg Hx     Melanoma Neg Hx     Psoriasis Neg Hx     Lupus Neg Hx     Eczema Neg Hx     Acne Neg Hx     Asthma Neg Hx     Emphysema Neg Hx      Social History     Tobacco Use    Smoking status: Never Smoker    Smokeless tobacco: Never Used   Substance Use Topics    Alcohol use: No     Alcohol/week: 0.0 standard drinks    Drug use: No     Review of Systems   Constitutional: Negative for chills and fever.   HENT: Negative for congestion and rhinorrhea.    Eyes: Negative for pain and visual disturbance.   Respiratory: Negative for cough and shortness of breath.    Cardiovascular: Negative for chest pain and palpitations.   Gastrointestinal: Positive for abdominal distention, diarrhea, nausea and vomiting. Negative for blood in stool.   Genitourinary: Negative for difficulty urinating and dysuria.   Musculoskeletal: Negative for back pain and joint swelling.   Skin: Negative for color change and rash.   Neurological: Negative for  syncope, light-headedness and numbness.       Physical Exam     Initial Vitals [07/03/22 0801]   BP Pulse Resp Temp SpO2   118/78 86 16 97.7 °F (36.5 °C) 97 %      MAP       --         Physical Exam    Nursing note and vitals reviewed.  Constitutional: He is not diaphoretic. No distress.   HENT:   Head: Normocephalic and atraumatic.   Mouth/Throat: Oropharynx is clear and moist.   Eyes: Conjunctivae and EOM are normal.   Neck: Neck supple.   Normal range of motion.  Cardiovascular: Normal rate, regular rhythm, normal heart sounds and intact distal pulses.   Pulmonary/Chest: Breath sounds normal. He has no wheezes. He has no rhonchi. He has no rales.   Abdominal: Abdomen is soft. He exhibits distension. There is abdominal tenderness.   Moderately distended abdomen with mild diffuse pain at periumbilical region There is no guarding.   Musculoskeletal:         General: No tenderness or edema. Normal range of motion.      Cervical back: Normal range of motion and neck supple.     Neurological: He is alert and oriented to person, place, and time. He has normal strength. No sensory deficit.   Skin: Skin is warm and dry. Capillary refill takes less than 2 seconds.         ED Course   Procedures  Labs Reviewed   CBC W/ AUTO DIFFERENTIAL - Abnormal; Notable for the following components:       Result Value    MCH 31.6 (*)     Gran # (ANC) 9.9 (*)     Immature Grans (Abs) 0.06 (*)     Mono # 1.4 (*)     Gran % 79.9 (*)     Lymph % 7.6 (*)     All other components within normal limits   COMPREHENSIVE METABOLIC PANEL - Abnormal; Notable for the following components:    Sodium 133 (*)     Total Bilirubin 1.5 (*)     AST 57 (*)     ALT 51 (*)     All other components within normal limits   URINALYSIS, REFLEX TO URINE CULTURE - Abnormal; Notable for the following components:    Specific Gravity, UA >1.030 (*)     Ketones, UA 1+ (*)     All other components within normal limits    Narrative:     Specimen Source->Urine   ISTAT  PROCEDURE - Abnormal; Notable for the following components:    POC Sodium 135 (*)     POC Chloride 94 (*)     All other components within normal limits   LIPASE   LACTIC ACID, PLASMA   SARS-COV-2 RDRP GENE   ISTAT CHEM8          Imaging Results          X-Ray Abdomen AP 1 View (KUB) (Final result)  Result time 07/03/22 13:27:32    Final result by Hugo Watt MD (07/03/22 13:27:32)                 Impression:      As above.      Electronically signed by: Hugo Watt MD  Date:    07/03/2022  Time:    13:27             Narrative:    EXAMINATION:  XR ABDOMEN AP 1 VIEW    CLINICAL HISTORY:  Encounter for other specified special examinations    TECHNIQUE:  AP View(s) of the abdomen was performed.    COMPARISON:  CT abdomen and pelvis same day and abdominal radiograph 11/23/2013    FINDINGS:  Lower abdomen and pelvis and lateral left abdomen not included in field of view.    Enteric tube side port and tip projected over the left upper quadrant/moderately distended gastric bubble, presumably within the stomach.  Several scattered dilated small bowel loops at the mid to upper abdomen not simply changed from CT earlier same day.  No large amount of free air seen.  Imaged lung bases are free of large consolidation.  No acute osseous process seen.                               CT Abdomen Pelvis With Contrast (Final result)  Result time 07/03/22 10:44:34    Final result by Sarmad Mendoza MD (07/03/22 10:44:34)                 Impression:      1. Dilated fluid-filled small bowel loops measure up to 4 cm with suggested transition point to relatively decompressed more distal small bowel loops in the right mid abdomen, as described.  Findings concerning for acute small bowel obstruction, with ileus additionally considered.  2. Additional details, as provided in the body of report.      Electronically signed by: Sarmad Mendoza  Date:    07/03/2022  Time:    10:44             Narrative:    EXAMINATION:  CT ABDOMEN PELVIS WITH  CONTRAST    CLINICAL HISTORY:  Bowel obstruction suspected;    TECHNIQUE:  Low dose axial images, sagittal and coronal reformations were obtained from the lung bases to the pubic symphysis following the IV administration of 100 mL of Omnipaque 350    COMPARISON:  Ultrasound of the abdomen performed 10/22/2019.    FINDINGS:  Lower chest: Coronary artery calcifications.  Atelectasis and scar suggested at the lung bases.  Nonspecific bronchial wall thickening.    Liver: Unremarkable.    Gallbladder and bile ducts: Status post cholecystectomy.  Prominent caliber intrahepatic and extrahepatic bile ducts felt to reflect post cholecystectomy status.    Pancreas: Unremarkable.    Spleen: Unremarkable.    Adrenals: Unremarkable.    Kidneys: Subcentimeter hypoattenuating lesions in the kidneys too small to definitely characterize.    Lymph nodes: No abdominal or pelvic lymphadenopathy.    Bowel and mesentery: Relatively diffuse dilatation of fluid-filled small bowel loops measuring up to 4 cm with suggested transition to relatively decompressed more distal small bowel loops in the right mid abdomen (coronal reformat series 601, image 112; axial series 2, image 100).  Small hiatal hernia.  Nonspecific wall thickening of the visualized distal esophagus, finding which may be seen in the setting of esophagitis.  Moderate distention of stomach with fluid and ingested material.  Colonic diverticulosis without definite evidence of acute diverticulitis.  Submucosal hypoattenuation in the region of the cecum and proximal right colon could indicate sequela of chronic nonspecific inflammation.  Normal caliber appendix.    Abdominal aorta: Nonaneurysmal.  Atherosclerosis.    Inferior vena cava: Unremarkable.    Free fluid or free air: None.    Pelvis: Limited assessment due to artifact from orthopedic hardware.    Body wall: Remote operative sequela suggested in the anterior abdominal wall.  Asymmetric subcutaneous edema involving the  left lateral abdominal wall extending to the visualized proximal thigh.    Bones: No definite acute abnormality.  Osseous demineralization.  Degenerative findings involving the spine, sacroiliac joints, pubic symphysis, and left hip joint.  Status post right total hip arthroplasty.  Suggested benign-appearing lesion left iliac bone                                 Medications   metoprolol injection 5 mg (5 mg Intravenous Given 7/3/22 2058)   sodium chloride 0.9% flush 10 mL (has no administration in time range)   acetaminophen tablet 650 mg (has no administration in time range)   lactated ringers infusion ( Intravenous New Bag 7/3/22 1430)   enoxaparin injection 40 mg (40 mg Subcutaneous Given 7/3/22 1746)   HYDROmorphone injection 0.5 mg (has no administration in time range)   HYDROmorphone injection 1 mg (has no administration in time range)   ondansetron injection 4 mg (4 mg Intravenous Given 7/3/22 1432)   prochlorperazine injection Soln 10 mg (10 mg Intravenous Given 7/3/22 0944)   lactated ringers bolus 1,000 mL (0 mLs Intravenous Stopped 7/3/22 1124)   iohexoL (OMNIPAQUE 350) injection 100 mL (100 mLs Intravenous Given 7/3/22 1004)   ondansetron injection 4 mg (4 mg Intravenous Given 7/3/22 1430)     Medical Decision Making:   History:   Old Medical Records: I decided to obtain old medical records.  Initial Assessment:   82yo M with PMH of HLD, cholecystectomy, osteoarthritis of right knee s/p arthroplasty (6/20/22) presenting to ED with complaint of diarrhea and vomiting.   Differential Diagnosis:   SBO  Gastroenteritis  Diverticular disease  Viral syndrome    Independently Interpreted Test(s):   I have ordered and independently interpreted X-rays - see summary below.       <> Summary of X-Ray Reading(s): CT abdomen:  Small bowel obstruction  Clinical Tests:   Lab Tests: Ordered and Reviewed  Radiological Study: Ordered and Reviewed  ED Management:  Patient is hemodynamically stable.  He is nontoxic  appearing.  He received Zofran from EMS.  His physical exam is notable for abdominal distension. Given history of choleystectomy, there is concern for SBO 2/2 adhesions.  Labs are unremarkable.  CT abdomen demonstrates small bowel obstruction. NG tube placed.  Patient having additional episodes of bilious emesis.  Zofran given.  General surgery consulted and admitting patient to their service for further management.    Other:   I have discussed this case with another health care provider.       <> Summary of the Discussion: General surgery            Attending Attestation:   Physician Attestation Statement for Resident:  As the supervising MD   Physician Attestation Statement: I have personally seen and examined this patient.   I agree with the above history. -: Abdominal pain   As the supervising MD I agree with the above PE.    As the supervising MD I agree with the above treatment, course, plan, and disposition.                         Clinical Impression:   Final diagnoses:  [Z01.89] Encounter for imaging study to confirm nasogastric (NG) tube placement  [K56.609] Small bowel obstruction (Primary)          ED Disposition Condition    Admit               Vida Osborn MD  Resident  07/03/22 1530       Jez Xavier III, MD  07/04/22 [id number]

## 2022-07-03 NOTE — ED TRIAGE NOTES
Pt presents with N/V/D accompanied by generalized abdominal discomfort x 2 days. Pt reports recent surgery to left knee.

## 2022-07-03 NOTE — NURSING
Patient arrived to unit. Vitals taken assessment done. NG accidentally removed upon tranfer to bed. New NG placed and verified.     Patient stable. Family at bedside.

## 2022-07-04 ENCOUNTER — PATIENT MESSAGE (OUTPATIENT)
Dept: ORTHOPEDICS | Facility: CLINIC | Age: 82
End: 2022-07-04
Payer: MEDICARE

## 2022-07-04 LAB
ALBUMIN SERPL BCP-MCNC: 3 G/DL (ref 3.5–5.2)
ALP SERPL-CCNC: 90 U/L (ref 55–135)
ALT SERPL W/O P-5'-P-CCNC: 41 U/L (ref 10–44)
ANION GAP SERPL CALC-SCNC: 13 MMOL/L (ref 8–16)
AST SERPL-CCNC: 35 U/L (ref 10–40)
BASOPHILS # BLD AUTO: 0.04 K/UL (ref 0–0.2)
BASOPHILS NFR BLD: 0.5 % (ref 0–1.9)
BILIRUB SERPL-MCNC: 1.6 MG/DL (ref 0.1–1)
BUN SERPL-MCNC: 16 MG/DL (ref 8–23)
CALCIUM SERPL-MCNC: 8.5 MG/DL (ref 8.7–10.5)
CHLORIDE SERPL-SCNC: 96 MMOL/L (ref 95–110)
CO2 SERPL-SCNC: 29 MMOL/L (ref 23–29)
CREAT SERPL-MCNC: 0.8 MG/DL (ref 0.5–1.4)
DIFFERENTIAL METHOD: ABNORMAL
EOSINOPHIL # BLD AUTO: 0.1 K/UL (ref 0–0.5)
EOSINOPHIL NFR BLD: 1 % (ref 0–8)
ERYTHROCYTE [DISTWIDTH] IN BLOOD BY AUTOMATED COUNT: 12.6 % (ref 11.5–14.5)
EST. GFR  (AFRICAN AMERICAN): >60 ML/MIN/1.73 M^2
EST. GFR  (NON AFRICAN AMERICAN): >60 ML/MIN/1.73 M^2
GLUCOSE SERPL-MCNC: 70 MG/DL (ref 70–110)
HCT VFR BLD AUTO: 42.3 % (ref 40–54)
HGB BLD-MCNC: 14.1 G/DL (ref 14–18)
IMM GRANULOCYTES # BLD AUTO: 0.03 K/UL (ref 0–0.04)
IMM GRANULOCYTES NFR BLD AUTO: 0.3 % (ref 0–0.5)
LYMPHOCYTES # BLD AUTO: 1.3 K/UL (ref 1–4.8)
LYMPHOCYTES NFR BLD: 15.1 % (ref 18–48)
MAGNESIUM SERPL-MCNC: 2 MG/DL (ref 1.6–2.6)
MCH RBC QN AUTO: 31.5 PG (ref 27–31)
MCHC RBC AUTO-ENTMCNC: 33.3 G/DL (ref 32–36)
MCV RBC AUTO: 95 FL (ref 82–98)
MONOCYTES # BLD AUTO: 1.4 K/UL (ref 0.3–1)
MONOCYTES NFR BLD: 15.9 % (ref 4–15)
NEUTROPHILS # BLD AUTO: 5.8 K/UL (ref 1.8–7.7)
NEUTROPHILS NFR BLD: 67.2 % (ref 38–73)
NRBC BLD-RTO: 0 /100 WBC
PHOSPHATE SERPL-MCNC: 3 MG/DL (ref 2.7–4.5)
PLATELET # BLD AUTO: 296 K/UL (ref 150–450)
PMV BLD AUTO: 10.3 FL (ref 9.2–12.9)
POTASSIUM SERPL-SCNC: 3.7 MMOL/L (ref 3.5–5.1)
PROT SERPL-MCNC: 5.4 G/DL (ref 6–8.4)
RBC # BLD AUTO: 4.47 M/UL (ref 4.6–6.2)
SODIUM SERPL-SCNC: 138 MMOL/L (ref 136–145)
WBC # BLD AUTO: 8.6 K/UL (ref 3.9–12.7)

## 2022-07-04 PROCEDURE — 63600175 PHARM REV CODE 636 W HCPCS

## 2022-07-04 PROCEDURE — 25000003 PHARM REV CODE 250: Performed by: STUDENT IN AN ORGANIZED HEALTH CARE EDUCATION/TRAINING PROGRAM

## 2022-07-04 PROCEDURE — 11000001 HC ACUTE MED/SURG PRIVATE ROOM

## 2022-07-04 PROCEDURE — 83735 ASSAY OF MAGNESIUM: CPT | Performed by: STUDENT IN AN ORGANIZED HEALTH CARE EDUCATION/TRAINING PROGRAM

## 2022-07-04 PROCEDURE — 99900035 HC TECH TIME PER 15 MIN (STAT)

## 2022-07-04 PROCEDURE — 36415 COLL VENOUS BLD VENIPUNCTURE: CPT | Performed by: STUDENT IN AN ORGANIZED HEALTH CARE EDUCATION/TRAINING PROGRAM

## 2022-07-04 PROCEDURE — 84100 ASSAY OF PHOSPHORUS: CPT | Performed by: STUDENT IN AN ORGANIZED HEALTH CARE EDUCATION/TRAINING PROGRAM

## 2022-07-04 PROCEDURE — 63600175 PHARM REV CODE 636 W HCPCS: Performed by: STUDENT IN AN ORGANIZED HEALTH CARE EDUCATION/TRAINING PROGRAM

## 2022-07-04 PROCEDURE — 85025 COMPLETE CBC W/AUTO DIFF WBC: CPT | Performed by: STUDENT IN AN ORGANIZED HEALTH CARE EDUCATION/TRAINING PROGRAM

## 2022-07-04 PROCEDURE — 80053 COMPREHEN METABOLIC PANEL: CPT | Performed by: STUDENT IN AN ORGANIZED HEALTH CARE EDUCATION/TRAINING PROGRAM

## 2022-07-04 PROCEDURE — 94761 N-INVAS EAR/PLS OXIMETRY MLT: CPT

## 2022-07-04 RX ORDER — ACETAMINOPHEN 10 MG/ML
1000 INJECTION, SOLUTION INTRAVENOUS ONCE
Status: COMPLETED | OUTPATIENT
Start: 2022-07-04 | End: 2022-07-04

## 2022-07-04 RX ORDER — LORAZEPAM 2 MG/ML
2 INJECTION INTRAMUSCULAR
Status: CANCELLED | OUTPATIENT
Start: 2022-07-04

## 2022-07-04 RX ADMIN — ACETAMINOPHEN 1000 MG: 10 INJECTION INTRAVENOUS at 08:07

## 2022-07-04 RX ADMIN — METOROPROLOL TARTRATE 5 MG: 5 INJECTION, SOLUTION INTRAVENOUS at 09:07

## 2022-07-04 RX ADMIN — ONDANSETRON 4 MG: 2 INJECTION INTRAMUSCULAR; INTRAVENOUS at 07:07

## 2022-07-04 RX ADMIN — METOROPROLOL TARTRATE 5 MG: 5 INJECTION, SOLUTION INTRAVENOUS at 08:07

## 2022-07-04 RX ADMIN — ENOXAPARIN SODIUM 40 MG: 100 INJECTION SUBCUTANEOUS at 04:07

## 2022-07-04 NOTE — SUBJECTIVE & OBJECTIVE
Interval History:   No acute events overnight.   AFVSS.  NGT with 1.6L bilious output since placement.  States feeling less distended and passing flatus.   Denies nausea/emesis around tube.   WBC wnl.     Medications:  Continuous Infusions:   lactated ringers 125 mL/hr at 07/03/22 1430     Scheduled Meds:   enoxaparin  40 mg Subcutaneous Daily    metoprolol  5 mg Intravenous Q12H     PRN Meds:acetaminophen, HYDROmorphone, HYDROmorphone, ondansetron, sodium chloride 0.9%     Review of patient's allergies indicates:   Allergen Reactions    Novocain [procaine] Shortness Of Breath           Objective:     Vital Signs (Most Recent):  Temp: 98 °F (36.7 °C) (07/04/22 0833)  Pulse: 86 (07/04/22 0833)  Resp: 17 (07/04/22 0833)  BP: 129/73 (07/04/22 0931)  SpO2: (!) 94 % (07/04/22 0833)   Vital Signs (24h Range):  Temp:  [96.3 °F (35.7 °C)-98.1 °F (36.7 °C)] 98 °F (36.7 °C)  Pulse:  [73-87] 86  Resp:  [16-20] 17  SpO2:  [92 %-94 %] 94 %  BP: (129-158)/(68-75) 129/73     Weight: 86.2 kg (190 lb)  Body mass index is 29.75 kg/m².    Intake/Output - Last 3 Shifts         07/02 0700 07/03 0659 07/03 0700 07/04 0659 07/04 0700 07/05 0659    IV Piggyback  1698.3     Total Intake(mL/kg)  1698.3 (19.7)     Urine (mL/kg/hr)  200     Drains  1600     Total Output  1800     Net  -101.7                    Physical Exam  Constitutional:       General: He is not in acute distress.  HENT:      Head: Normocephalic and atraumatic.      Nose:      Comments: NGT in place  Eyes:      Extraocular Movements: Extraocular movements intact.      Conjunctiva/sclera: Conjunctivae normal.      Pupils: Pupils are equal, round, and reactive to light.   Cardiovascular:      Rate and Rhythm: Normal rate and regular rhythm.      Pulses: Normal pulses.   Pulmonary:      Effort: Pulmonary effort is normal.   Abdominal:      Palpations: Abdomen is soft.      Comments: Soft, non-tender. Less distension than day prior.    Musculoskeletal:         General:  Normal range of motion.   Neurological:      General: No focal deficit present.      Mental Status: He is alert.       Significant Labs:  I have reviewed all pertinent lab results within the past 24 hours.  CBC:   Recent Labs   Lab 07/04/22 0411   WBC 8.60   RBC 4.47*   HGB 14.1   HCT 42.3      MCV 95   MCH 31.5*   MCHC 33.3     CMP:   Recent Labs   Lab 07/04/22 0411   GLU 70   CALCIUM 8.5*   ALBUMIN 3.0*   PROT 5.4*      K 3.7   CO2 29   CL 96   BUN 16   CREATININE 0.8   ALKPHOS 90   ALT 41   AST 35   BILITOT 1.6*       Significant Diagnostics:  I have reviewed all pertinent imaging results/findings within the past 24 hours.

## 2022-07-04 NOTE — ASSESSMENT & PLAN NOTE
81 y.o. male with PMHx of HLD, hx of prostate cancer admitted with SBO    - NPO  - Continue NGT to LIWS  - Still with high NGT output. Will allow another day of decompression and plan for GGC tomorrow.   - Pain meds prn  - Antiemetics prn  - Encourage ambulation. Had recent knee surgery; will have PT on board.   - Home meds converted to IV  - Daily labs  - Please call with questions

## 2022-07-04 NOTE — PROGRESS NOTES
[facility]  General Surgery  Progress Note    Subjective:     History of Present Illness:  Herber Camarillo is a 81 y.o. male with PMHx of HLD, hx of prostate cancer who presents to ED with abdominal pain and vomiting. This onset last night. He has vomited numerous times since then. His vomit is greenish. The pain is located in the epigastric area. He has never had similar symptoms previously. His last BM was yesterday. He did have recent L knee arthroplasty on 6/20/22, has not been on home narcotics.   Work up in ED demonstrates transition in pelvis with dilated stomach and small bowel. Labs unremarkable overall.   Prior abdominal surgeries include lap adam and open umbilical hernia repair.             Post-Op Info:  * No surgery found *         Interval History:   No acute events overnight.   AFVSS.  NGT with 1.6L bilious output since placement.  States feeling less distended and passing flatus.   Denies nausea/emesis around tube.   WBC wnl.     Medications:  Continuous Infusions:   lactated ringers 125 mL/hr at 07/03/22 1430     Scheduled Meds:   enoxaparin  40 mg Subcutaneous Daily    metoprolol  5 mg Intravenous Q12H     PRN Meds:acetaminophen, HYDROmorphone, HYDROmorphone, ondansetron, sodium chloride 0.9%     Review of patient's allergies indicates:   Allergen Reactions    Novocain [procaine] Shortness Of Breath           Objective:     Vital Signs (Most Recent):  Temp: 98 °F (36.7 °C) (07/04/22 0833)  Pulse: 86 (07/04/22 0833)  Resp: 17 (07/04/22 0833)  BP: 129/73 (07/04/22 0931)  SpO2: (!) 94 % (07/04/22 0833)   Vital Signs (24h Range):  Temp:  [96.3 °F (35.7 °C)-98.1 °F (36.7 °C)] 98 °F (36.7 °C)  Pulse:  [73-87] 86  Resp:  [16-20] 17  SpO2:  [92 %-94 %] 94 %  BP: (129-158)/(68-75) 129/73     Weight: 86.2 kg (190 lb)  Body mass index is 29.75 kg/m².    Intake/Output - Last 3 Shifts         07/02 0700 07/03 0659 07/03 0700 07/04 0659 07/04 0700 07/05 0659    IV Piggyback  1698.3     Total  Intake(mL/kg)  1698.3 (19.7)     Urine (mL/kg/hr)  200     Drains  1600     Total Output  1800     Net  -101.7                    Physical Exam  Constitutional:       General: He is not in acute distress.  HENT:      Head: Normocephalic and atraumatic.      Nose:      Comments: NGT in place  Eyes:      Extraocular Movements: Extraocular movements intact.      Conjunctiva/sclera: Conjunctivae normal.      Pupils: Pupils are equal, round, and reactive to light.   Cardiovascular:      Rate and Rhythm: Normal rate and regular rhythm.      Pulses: Normal pulses.   Pulmonary:      Effort: Pulmonary effort is normal.   Abdominal:      Palpations: Abdomen is soft.      Comments: Soft, non-tender. Less distension than day prior.    Musculoskeletal:         General: Normal range of motion.   Neurological:      General: No focal deficit present.      Mental Status: He is alert.       Significant Labs:  I have reviewed all pertinent lab results within the past 24 hours.  CBC:   Recent Labs   Lab 07/04/22  0411   WBC 8.60   RBC 4.47*   HGB 14.1   HCT 42.3      MCV 95   MCH 31.5*   MCHC 33.3     CMP:   Recent Labs   Lab 07/04/22  0411   GLU 70   CALCIUM 8.5*   ALBUMIN 3.0*   PROT 5.4*      K 3.7   CO2 29   CL 96   BUN 16   CREATININE 0.8   ALKPHOS 90   ALT 41   AST 35   BILITOT 1.6*       Significant Diagnostics:  I have reviewed all pertinent imaging results/findings within the past 24 hours.    Assessment/Plan:     * SBO (small bowel obstruction)  81 y.o. male with PMHx of HLD, hx of prostate cancer admitted with SBO    - NPO  - Continue NGT to LIWS  - Still with high NGT output. Will allow another day of decompression and plan for GGC tomorrow.   - Pain meds prn  - Antiemetics prn  - Encourage ambulation. Had recent knee surgery; will have PT on board.   - Home meds converted to IV  - Daily labs  - Please call with questions        Yola Mathur MD  General Surgery  [facility]

## 2022-07-05 LAB
ALBUMIN SERPL BCP-MCNC: 2.9 G/DL (ref 3.5–5.2)
ALP SERPL-CCNC: 82 U/L (ref 55–135)
ALT SERPL W/O P-5'-P-CCNC: 41 U/L (ref 10–44)
ANION GAP SERPL CALC-SCNC: 14 MMOL/L (ref 8–16)
AST SERPL-CCNC: 48 U/L (ref 10–40)
BASOPHILS # BLD AUTO: 0.03 K/UL (ref 0–0.2)
BASOPHILS NFR BLD: 0.4 % (ref 0–1.9)
BILIRUB SERPL-MCNC: 1.8 MG/DL (ref 0.1–1)
BUN SERPL-MCNC: 17 MG/DL (ref 8–23)
CALCIUM SERPL-MCNC: 8.7 MG/DL (ref 8.7–10.5)
CHLORIDE SERPL-SCNC: 94 MMOL/L (ref 95–110)
CO2 SERPL-SCNC: 32 MMOL/L (ref 23–29)
CREAT SERPL-MCNC: 0.8 MG/DL (ref 0.5–1.4)
DIFFERENTIAL METHOD: ABNORMAL
EOSINOPHIL # BLD AUTO: 0 K/UL (ref 0–0.5)
EOSINOPHIL NFR BLD: 0.5 % (ref 0–8)
ERYTHROCYTE [DISTWIDTH] IN BLOOD BY AUTOMATED COUNT: 12.3 % (ref 11.5–14.5)
EST. GFR  (AFRICAN AMERICAN): >60 ML/MIN/1.73 M^2
EST. GFR  (NON AFRICAN AMERICAN): >60 ML/MIN/1.73 M^2
GLUCOSE SERPL-MCNC: 84 MG/DL (ref 70–110)
HCT VFR BLD AUTO: 41.7 % (ref 40–54)
HGB BLD-MCNC: 13.9 G/DL (ref 14–18)
IMM GRANULOCYTES # BLD AUTO: 0.03 K/UL (ref 0–0.04)
IMM GRANULOCYTES NFR BLD AUTO: 0.4 % (ref 0–0.5)
LYMPHOCYTES # BLD AUTO: 0.9 K/UL (ref 1–4.8)
LYMPHOCYTES NFR BLD: 11.6 % (ref 18–48)
MAGNESIUM SERPL-MCNC: 2 MG/DL (ref 1.6–2.6)
MCH RBC QN AUTO: 31.7 PG (ref 27–31)
MCHC RBC AUTO-ENTMCNC: 33.3 G/DL (ref 32–36)
MCV RBC AUTO: 95 FL (ref 82–98)
MONOCYTES # BLD AUTO: 1.5 K/UL (ref 0.3–1)
MONOCYTES NFR BLD: 18.9 % (ref 4–15)
NEUTROPHILS # BLD AUTO: 5.4 K/UL (ref 1.8–7.7)
NEUTROPHILS NFR BLD: 68.2 % (ref 38–73)
NRBC BLD-RTO: 0 /100 WBC
PHOSPHATE SERPL-MCNC: 3.1 MG/DL (ref 2.7–4.5)
PLATELET # BLD AUTO: 268 K/UL (ref 150–450)
PMV BLD AUTO: 10.3 FL (ref 9.2–12.9)
POTASSIUM SERPL-SCNC: 3.7 MMOL/L (ref 3.5–5.1)
PROT SERPL-MCNC: 5.9 G/DL (ref 6–8.4)
RBC # BLD AUTO: 4.38 M/UL (ref 4.6–6.2)
SODIUM SERPL-SCNC: 140 MMOL/L (ref 136–145)
WBC # BLD AUTO: 7.94 K/UL (ref 3.9–12.7)

## 2022-07-05 PROCEDURE — 99233 SBSQ HOSP IP/OBS HIGH 50: CPT | Mod: GC,,, | Performed by: STUDENT IN AN ORGANIZED HEALTH CARE EDUCATION/TRAINING PROGRAM

## 2022-07-05 PROCEDURE — 63600175 PHARM REV CODE 636 W HCPCS: Performed by: STUDENT IN AN ORGANIZED HEALTH CARE EDUCATION/TRAINING PROGRAM

## 2022-07-05 PROCEDURE — 11000001 HC ACUTE MED/SURG PRIVATE ROOM

## 2022-07-05 PROCEDURE — 80053 COMPREHEN METABOLIC PANEL: CPT | Performed by: STUDENT IN AN ORGANIZED HEALTH CARE EDUCATION/TRAINING PROGRAM

## 2022-07-05 PROCEDURE — 36415 COLL VENOUS BLD VENIPUNCTURE: CPT | Performed by: STUDENT IN AN ORGANIZED HEALTH CARE EDUCATION/TRAINING PROGRAM

## 2022-07-05 PROCEDURE — 97161 PT EVAL LOW COMPLEX 20 MIN: CPT

## 2022-07-05 PROCEDURE — 84100 ASSAY OF PHOSPHORUS: CPT | Performed by: STUDENT IN AN ORGANIZED HEALTH CARE EDUCATION/TRAINING PROGRAM

## 2022-07-05 PROCEDURE — 25500020 PHARM REV CODE 255: Performed by: STUDENT IN AN ORGANIZED HEALTH CARE EDUCATION/TRAINING PROGRAM

## 2022-07-05 PROCEDURE — 25000003 PHARM REV CODE 250: Performed by: STUDENT IN AN ORGANIZED HEALTH CARE EDUCATION/TRAINING PROGRAM

## 2022-07-05 PROCEDURE — 85025 COMPLETE CBC W/AUTO DIFF WBC: CPT | Performed by: STUDENT IN AN ORGANIZED HEALTH CARE EDUCATION/TRAINING PROGRAM

## 2022-07-05 PROCEDURE — 99233 PR SUBSEQUENT HOSPITAL CARE,LEVL III: ICD-10-PCS | Mod: GC,,, | Performed by: STUDENT IN AN ORGANIZED HEALTH CARE EDUCATION/TRAINING PROGRAM

## 2022-07-05 PROCEDURE — 97116 GAIT TRAINING THERAPY: CPT

## 2022-07-05 PROCEDURE — 83735 ASSAY OF MAGNESIUM: CPT | Performed by: STUDENT IN AN ORGANIZED HEALTH CARE EDUCATION/TRAINING PROGRAM

## 2022-07-05 RX ORDER — PROMETHAZINE HYDROCHLORIDE 12.5 MG/1
12.5 TABLET ORAL EVERY 6 HOURS PRN
Status: DISCONTINUED | OUTPATIENT
Start: 2022-07-05 | End: 2022-07-09 | Stop reason: HOSPADM

## 2022-07-05 RX ADMIN — METOROPROLOL TARTRATE 5 MG: 5 INJECTION, SOLUTION INTRAVENOUS at 08:07

## 2022-07-05 RX ADMIN — DIATRIZOATE MEGLUMINE AND DIATRIZOATE SODIUM 50 ML: 660; 100 LIQUID ORAL; RECTAL at 08:07

## 2022-07-05 RX ADMIN — ONDANSETRON 4 MG: 2 INJECTION INTRAMUSCULAR; INTRAVENOUS at 01:07

## 2022-07-05 RX ADMIN — ENOXAPARIN SODIUM 40 MG: 100 INJECTION SUBCUTANEOUS at 06:07

## 2022-07-05 NOTE — PLAN OF CARE
Pt is AAOX4,VSS. Pt is progressing towards plan of care goals. Pain management has been assessed. No pain reported, pain reassessed throughout shift. SCDs in place with frequent checks for skin breakdown. NG tube to L nare in place, output documented. Nausea reported at beginning of shift, relieved with prn zofran. Fall precautions in place, no report of falls. Safety measures are in place bed in lowest position, wheels locked, call light within reach.

## 2022-07-05 NOTE — PT/OT/SLP EVAL
Physical Therapy Evaluation and Treatment     Patient Name:  Herber Camarillo  MRN: [id number]    Admit Date: 7/3/2022  Admitting Diagnosis:  SBO (small bowel obstruction)  Length of Stay: 2 days  Recent Surgery: * No surgery found *      Recommendations:     Discharge Recommendations: Outpatient PT (resume)  Equipment recommendations: none  Barriers to discharge: None Identified     Assessment:     Herber Camarillo is a 81 y.o. male admitted to [facility] on 7/3/2022 with medical diagnosis of SBO (small bowel obstruction). Pt presents with weakness, impaired endurance, impaired self care skills, impaired functional mobilty, gait instability, impaired balance, orthopedic precautions. These deficits effect their roles and responsibilities in which they were able to complete prior to admit. PTA, pt was going to outpatient PT to address deficits after L knee surgery. Pt would like to resume OPPT upon discharge. Herber Camarillo would benefit from acute PT intervention to improve quality of life, focus on recovery of impairments, provide patient/caregiver education, reduce fall risk, and maximize (I) and safety with functional mobility. Once medically stable, recommending pt discharge to Outpatient PT .      Rehab Prognosis: Good    Plan:     During this hospitalization, patient to be seen 3 x/week to address the identified rehab impairments via gait training, therapeutic activities, therapeutic exercises, neuromuscular re-education and progress towards stated goals.     Plan of Care Expires:  08/04/22  Plan of Care reviewed with: patient, spouse    This plan of care has been discussed with the patient/caregiver, who was included in its development and is in agreement with the identified goals and treatment plan.     Subjective     Communicated with RN prior to session.  Patient found up in chair upon PT entry to room, agreeable to evaluation. Pt's wife present during session.    Chief Complaint: hungry and thirsty (NPO >48  "hours)      Patient/Family Comments/goals: "I have no L knee pain. I am hoping to get back to walking on a cane"    Pain/Comfort:  · Pain Rating 1: 0/10  · Location - Side 1: Left  · Location 1: knee  · Pain Rating Post-Intervention 1: 0/10    Patients cultural, spiritual, Mandaeism conflicts given the current situation: None identified     Patient History: information obtained from pt and wife     Living Environment: Pt lives with wife in single level home  with threshold GET. Bathroom set-up: walk-in shower (primarily what pt uses) and tub-shower combination  Prior Level of Function: modified (I) for mobility and ADLs using SPC (prior to L knee surgery) and RW (post surgery) as AD; no assistance with ADLs  DME owned: rolling walker, single point cane, bedside commode and transfer tub bench  Support Available/Caregiver Assistance: wife is able to assist as needed    Objective:     Patient found with: NG tube, peripheral IV    Recent Surgery: * No surgery found *    General Precautions: Standard, fall   Orthopedic Precautions:LLE weight bearing as tolerated   Braces: N/A   Oxygen Device: room air      Exams:     Cognition:  · Alert  · Command following: Follows multistep verbal commands  · Communication: clear/fluent     Sensation:   o Light touch sensation: Intact BLEs     Gross Motor Coordination: No deficits noted during functional mobility tasks      Edema/Skin Integrity: None noted; Visible skin intact     Postural examination/scapula alignment: no observable deficits     Lower Extremity Range of Motion:  o Right Lower Extremity: WFL  o Left Lower Extremity: WFL     Lower Extremity Strength:  o Right Lower Extremity: grossly 4+/5  o Left Lower Extremity: grossly 4+/5    Functional Mobility:    Bed Mobility:  · Not assessed as pt in recliner    Transfers:   · Sit <> Stand Transfer: Contact Guard Assistance x 1 trials from recliner with RW AD              Gait:  · Distance: 150 ft  · Assistance level: " Contact Guard Assistance with progression to SBA  · Assistive Device: rolling walker  · Gait Assessment: decreased gait speed   · Initially required v/c for sequencing but after 5 ft pt was able to resume typical step-through gait sequencing    Balance:  · Dynamic Sitting: GOOD: Maintains balance through MODERATE excursions of active trunk movement  · Standing:  · Static: GOOD: Takes MODERATE challenges from all directions   · Dynamic: GOOD: Needs SUPERVISION only during gait and able to self right with moderate LOB    Outcome Measure: AM-PAC 6 CLICK MOBILITY  Total Score:18     Patient/Caregiver Education:     Therapist educated pt/caregiver regarding:    PT POC and goals for therapy    Safety with mobility and fall risk    Instruction on use of call button and importance of calling nursing staff for assistance with mobility    Time provided for therapeutic counseling and discussion of current health disposition. All questions answered to satisfaction, within scope of PT practice     Patient/caregiver able to verbalize understanding and expressed no further questions this visit; will follow-up with pt/caregiver during current admit for additional questions/concerns within scope of practice.     White board updated.     Patient left up in chair with all lines intact, wife present and FCD (L)/SCD (R) donned.    GOALS:   Multidisciplinary Problems     Physical Therapy Goals        Problem: Physical Therapy    Goal Priority Disciplines Outcome Goal Variances Interventions   Physical Therapy Goal     PT, PT/OT Ongoing, Progressing     Description: Goals to be met by: 22     Patient will increase functional independence with mobility by performin. Supine to sit with Modified [place]  2. Sit to stand transfer with Supervision  3. Bed to chair transfer with Supervision using LRAD  4. Gait  x 150 feet with Modified [place] using LRAD  5. Ascend/Descend 6 inch curb step with Stand-by Assistance  using Rolling Walker.  6. 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       Past Surgical History:   Procedure Laterality Date    CARDIAC CATHETERIZATION  01/2000    CATARACT EXTRACTION W/  INTRAOCULAR LENS IMPLANT Left 9/28/2020    Procedure: EXTRACTION, CATARACT, WITH IOL INSERTION;  Surgeon: Nam Morin MD;  Location: [facility];  Service: Ophthalmology;  Laterality: Left;    CATARACT EXTRACTION W/  INTRAOCULAR LENS IMPLANT Right 10/12/2020    Procedure: EXTRACTION, CATARACT, WITH IOL INSERTION;  Surgeon: Nam Morin MD;  Location: [facility];  Service: Ophthalmology;  Laterality: Right;  laser    CHOLECYSTECTOMY      HERNIA REPAIR      HIP SURGERY Right 01/11/2017    THR    KNEE ARTHROPLASTY Left 6/20/2022    Procedure: ARTHROPLASTY, KNEE/ KAVON NEXGEN/ CEMENTED TIBIA;  Surgeon: Rodolfo Nguyen MD;  Location: [facility] OR;  Service: Orthopedics;  Laterality: Left;    PROSTATE SURGERY      SHOULDER OPEN ROTATOR CUFF REPAIR      TONSILLECTOMY         Time Tracking:     PT Received On: 07/05/22  PT Start Time: 0942     PT Stop Time: 1007  PT Total Time (min): 25 min     Billable Minutes: Evaluation 10 and Gait Training 15    07/05/2022

## 2022-07-05 NOTE — PLAN OF CARE
[facility]  Initial Discharge Assessment       Primary Care Provider: Fidelina Loyola MD    Admission Diagnosis: Encounter for imaging study to confirm nasogastric (NG) tube placement [Z01.89]    Admission Date: 7/3/2022  Expected Discharge Date: 7/6/2022         Payor: HU[employer] MANAGED MEDICARE / Plan: HU[employer]NA TOTAL CARE ADVANTAGE / Product Type: Medicare Advantage /     Extended Emergency Contact Information  Primary Emergency Contact: Ana Camarillo   [place]  Mobile Phone: [phone]  Relation: Spouse  Secondary Emergency Contact: Jose ManueldurgaAshanti  Mobile Phone: [phone]  Relation: Daughter  Preferred language: English   needed? No    Discharge Plan A: Home with family  Discharge Plan B: Home with family, Home Health      [facility] (Now [facility]) - [place], [place] - [address]  [address] [address]  Phone: [phone] Fax: [phone]    [facility]  [address]  [place] [address]  Phone: [phone] Fax: [phone]      Initial Assessment (most recent)     Adult Discharge Assessment - 07/05/22 0952        Discharge Assessment    Assessment Type Discharge Planning Assessment     Confirmed/corrected address, phone number and insurance Yes     Confirmed Demographics Correct on Facesheet     Source of Information patient     Does patient/caregiver understand observation status Yes     Communicated ALDAIR with patient/caregiver Yes     Lives With spouse     Do you expect to return to your current living situation? Yes     Do you have help at home or someone to help you manage your care at home? Yes     Who are your caregiver(s) and their phone number(s)? Anajaycee Camarillo (Spouse) [phone]     Prior to hospitilization cognitive status: Alert/Oriented     Current cognitive status: Alert/Oriented     Walking or Climbing Stairs Difficulty none     Dressing/Bathing Difficulty none     Home  Layout Able to live on 1st floor     Equipment Currently Used at Home walker, rolling;bath bench;cane, straight     Readmission within 30 days? No     Patient currently being followed by outpatient case management? No     Do you currently have service(s) that help you manage your care at home? No     Do you take prescription medications? Yes     Do you have prescription coverage? Yes     Do you have any problems affording any of your prescribed medications? No     Is the patient taking medications as prescribed? yes     Who is going to help you get home at discharge? Spouse (magno)     How do you get to doctors appointments? family or friend will provide     Are you on dialysis? No     Do you take coumadin? No     Discharge Plan A Home with family     Discharge Plan B Home with family;Home Health                    Spoke with patient and spouse at bedside to complete d/c planning assessment. Patient lives with spouse in a single story home with one step to enter. Independent with ADL'S. Patient has a rolling walker, bath bench and cane in home. Verified PCP, Pharmacy and Health insurance. PT/OT to eval today for post-acute needs. Patient will have help at home from spouse. Will continue to follow for needs.

## 2022-07-05 NOTE — PLAN OF CARE
PT evaluation complete - see note for details. POC and goals established.    Problem: Physical Therapy  Goal: Physical Therapy Goal  Description: Goals to be met by: 22     Patient will increase functional independence with mobility by performin. Supine to sit with Modified [place]  2. Sit to stand transfer with Supervision  3. Bed to chair transfer with Supervision using LRAD  4. Gait  x 150 feet with Modified [place] using LRAD  5. Ascend/Descend 6 inch curb step with Stand-by Assistance using Rolling Walker.  6. Lower extremity exercise program x30 reps per handout, with independence    Outcome: Ongoing, Progressing     2022

## 2022-07-05 NOTE — ASSESSMENT & PLAN NOTE
Patient is an 81 year old male with PMHx of HLD, CAD, OA, prostate cancer presenting with SBO. Clinically stable     - NPO  - Continue NGT to LIWS  - Gastrograffin challenge today with KUBs at 4 and 8 hours w.   - Pain meds prn  - Antiemetics prn  - Encourage ambulation. Had recent knee surgery; will have PT on board.   - Holding most home neds in setting of acute disease and NPO. Home metprolol converted to IV  - Daily labs  - Replete electrolytes PRN  - DVT prophylaxis (SCDs and lovenox)  - OOB, ambulate    Dispo: not yet medical stable for dc.

## 2022-07-05 NOTE — PROGRESS NOTES
[facility]  General Surgery  Progress Note    Subjective:     History of Present Illness:  Herber Camarillo is a 81 y.o. male with PMHx of HLD, hx of prostate cancer who presents to ED with abdominal pain and vomiting. This onset last night. He has vomited numerous times since then. His vomit is greenish. The pain is located in the epigastric area. He has never had similar symptoms previously. His last BM was yesterday. He did have recent L knee arthroplasty on 6/20/22, has not been on home narcotics.   Work up in ED demonstrates transition in pelvis with dilated stomach and small bowel. Labs unremarkable overall.   Prior abdominal surgeries include lap adam and open umbilical hernia repair.             Post-Op Info:  * No surgery found *         Interval History: NAEON. Afebrile. HDS. Reports feeling better this morning. Denies n/v, passing flatus. NGT with 1650 out in 24 hours, 500 overnight. Pain is controlled with current regimen. Adequate UOP. No new symptoms or concerns this morning. Wife at bedside. All questions answered.       Medications:  Continuous Infusions:   lactated ringers 125 mL/hr at 07/03/22 1430     Scheduled Meds:   diatrizoate meglumineand-diatrizoate sodium  50 mL Per NG tube Once    enoxaparin  40 mg Subcutaneous Daily    metoprolol  5 mg Intravenous Q12H     PRN Meds:acetaminophen, HYDROmorphone, HYDROmorphone, ondansetron, sodium chloride 0.9%     Review of patient's allergies indicates:   Allergen Reactions    Novocain [procaine] Shortness Of Breath           Objective:     Vital Signs (Most Recent):  Temp: 98.1 °F (36.7 °C) (07/05/22 0545)  Pulse: 88 (07/05/22 0545)  Resp: 16 (07/05/22 0545)  BP: (!) 153/84 (07/05/22 0545)  SpO2: (!) 92 % (07/05/22 0545)   Vital Signs (24h Range):  Temp:  [97.6 °F (36.4 °C)-98.1 °F (36.7 °C)] 98.1 °F (36.7 °C)  Pulse:  [78-88] 88  Resp:  [16-18] 16  SpO2:  [88 %-97 %] 92 %  BP: (129-164)/(72-91) 153/84     Weight: 86.2 kg (190 lb)  Body mass  index is 29.75 kg/m².    Intake/Output - Last 3 Shifts         07/03 0700  07/04 0659 07/04 0700 07/05 0659 07/05 0700 07/06 0659    IV Piggyback 1698.3      Total Intake(mL/kg) 1698.3 (19.7)      Urine (mL/kg/hr) 200 650 (0.3)     Drains 1600 1650     Total Output 1800 2300     Net -101.7 -2300            Urine Occurrence  1 x             Physical Exam  Vitals and nursing note reviewed.   Constitutional:       General: He is not in acute distress.     Comments: Room air  NGT to LIWS   HENT:      Head: Normocephalic and atraumatic.      Nose: Nose normal.      Mouth/Throat:      Mouth: Mucous membranes are moist.      Pharynx: Oropharynx is clear.   Eyes:      Extraocular Movements: Extraocular movements intact.      Conjunctiva/sclera: Conjunctivae normal.   Cardiovascular:      Rate and Rhythm: Normal rate.   Pulmonary:      Effort: Pulmonary effort is normal. No respiratory distress.   Abdominal:      Palpations: Abdomen is soft.      Tenderness: There is no guarding or rebound.      Comments: Soft, non-tender. Less distension than day prior. No peritonitic signs   Musculoskeletal:         General: No deformity.   Skin:     General: Skin is warm and dry.   Neurological:      General: No focal deficit present.      Mental Status: He is alert.       Significant Labs:  I have reviewed all pertinent lab results within the past 24 hours.  CBC:   Recent Labs   Lab 07/05/22  0242   WBC 7.94   RBC 4.38*   HGB 13.9*   HCT 41.7      MCV 95   MCH 31.7*   MCHC 33.3       CMP:   Recent Labs   Lab 07/05/22 0242   GLU 84   CALCIUM 8.7   ALBUMIN 2.9*   PROT 5.9*      K 3.7   CO2 32*   CL 94*   BUN 17   CREATININE 0.8   ALKPHOS 82   ALT 41   AST 48*   BILITOT 1.8*         Significant Diagnostics:  I have reviewed all pertinent imaging results/findings within the past 24 hours.    Assessment/Plan:     * SBO (small bowel obstruction)  Patient is an 81 year old male with PMHx of HLD, CAD, OA, prostate cancer  presenting with SBO. Clinically stable     - NPO  - Continue NGT to LIWS  - Gastrograffin challenge today with KUBs at 4 and 8 hours w.   - Pain meds prn  - Antiemetics prn  - Encourage ambulation. Had recent knee surgery; will have PT on board.   - Holding most home neds in setting of acute disease and NPO. Home metprolol converted to IV  - Daily labs  - Replete electrolytes PRN  - DVT prophylaxis (SCDs and lovenox)  - OOB, ambulate    Dispo: not yet medical stable for dc.       Case discussed with Dr. Lainez.      KHUSHBOO JosephC  General Surgery  [facility]

## 2022-07-06 ENCOUNTER — TELEPHONE (OUTPATIENT)
Dept: INTERNAL MEDICINE | Facility: CLINIC | Age: 82
End: 2022-07-06
Payer: MEDICARE

## 2022-07-06 PROBLEM — K56.609 SBO (SMALL BOWEL OBSTRUCTION): Status: RESOLVED | Noted: 2022-07-03 | Resolved: 2022-07-06

## 2022-07-06 LAB
ALBUMIN SERPL BCP-MCNC: 2.7 G/DL (ref 3.5–5.2)
ALP SERPL-CCNC: 79 U/L (ref 55–135)
ALT SERPL W/O P-5'-P-CCNC: 31 U/L (ref 10–44)
ANION GAP SERPL CALC-SCNC: 12 MMOL/L (ref 8–16)
AST SERPL-CCNC: 37 U/L (ref 10–40)
BASOPHILS # BLD AUTO: 0.02 K/UL (ref 0–0.2)
BASOPHILS NFR BLD: 0.4 % (ref 0–1.9)
BILIRUB SERPL-MCNC: 1.4 MG/DL (ref 0.1–1)
BUN SERPL-MCNC: 13 MG/DL (ref 8–23)
CALCIUM SERPL-MCNC: 8.1 MG/DL (ref 8.7–10.5)
CHLORIDE SERPL-SCNC: 95 MMOL/L (ref 95–110)
CO2 SERPL-SCNC: 31 MMOL/L (ref 23–29)
CREAT SERPL-MCNC: 0.7 MG/DL (ref 0.5–1.4)
DIFFERENTIAL METHOD: ABNORMAL
EOSINOPHIL # BLD AUTO: 0.1 K/UL (ref 0–0.5)
EOSINOPHIL NFR BLD: 1.9 % (ref 0–8)
ERYTHROCYTE [DISTWIDTH] IN BLOOD BY AUTOMATED COUNT: 12.5 % (ref 11.5–14.5)
EST. GFR  (AFRICAN AMERICAN): >60 ML/MIN/1.73 M^2
EST. GFR  (NON AFRICAN AMERICAN): >60 ML/MIN/1.73 M^2
GLUCOSE SERPL-MCNC: 89 MG/DL (ref 70–110)
HCT VFR BLD AUTO: 38.3 % (ref 40–54)
HGB BLD-MCNC: 13.1 G/DL (ref 14–18)
IMM GRANULOCYTES # BLD AUTO: 0.01 K/UL (ref 0–0.04)
IMM GRANULOCYTES NFR BLD AUTO: 0.2 % (ref 0–0.5)
LYMPHOCYTES # BLD AUTO: 0.4 K/UL (ref 1–4.8)
LYMPHOCYTES NFR BLD: 8.3 % (ref 18–48)
MAGNESIUM SERPL-MCNC: 1.9 MG/DL (ref 1.6–2.6)
MCH RBC QN AUTO: 31.1 PG (ref 27–31)
MCHC RBC AUTO-ENTMCNC: 34.2 G/DL (ref 32–36)
MCV RBC AUTO: 91 FL (ref 82–98)
MONOCYTES # BLD AUTO: 0.9 K/UL (ref 0.3–1)
MONOCYTES NFR BLD: 17.7 % (ref 4–15)
NEUTROPHILS # BLD AUTO: 3.7 K/UL (ref 1.8–7.7)
NEUTROPHILS NFR BLD: 71.5 % (ref 38–73)
NRBC BLD-RTO: 0 /100 WBC
PHOSPHATE SERPL-MCNC: 2.1 MG/DL (ref 2.7–4.5)
PLATELET # BLD AUTO: 232 K/UL (ref 150–450)
PMV BLD AUTO: 10.1 FL (ref 9.2–12.9)
POTASSIUM SERPL-SCNC: 3.2 MMOL/L (ref 3.5–5.1)
PROT SERPL-MCNC: 5.1 G/DL (ref 6–8.4)
RBC # BLD AUTO: 4.21 M/UL (ref 4.6–6.2)
SODIUM SERPL-SCNC: 138 MMOL/L (ref 136–145)
WBC # BLD AUTO: 5.21 K/UL (ref 3.9–12.7)

## 2022-07-06 PROCEDURE — 94761 N-INVAS EAR/PLS OXIMETRY MLT: CPT

## 2022-07-06 PROCEDURE — 85025 COMPLETE CBC W/AUTO DIFF WBC: CPT | Performed by: STUDENT IN AN ORGANIZED HEALTH CARE EDUCATION/TRAINING PROGRAM

## 2022-07-06 PROCEDURE — 25000003 PHARM REV CODE 250: Performed by: STUDENT IN AN ORGANIZED HEALTH CARE EDUCATION/TRAINING PROGRAM

## 2022-07-06 PROCEDURE — 1111F DSCHRG MED/CURRENT MED MERGE: CPT | Mod: CPTII,GC,, | Performed by: STUDENT IN AN ORGANIZED HEALTH CARE EDUCATION/TRAINING PROGRAM

## 2022-07-06 PROCEDURE — 63600175 PHARM REV CODE 636 W HCPCS: Performed by: STUDENT IN AN ORGANIZED HEALTH CARE EDUCATION/TRAINING PROGRAM

## 2022-07-06 PROCEDURE — 36415 COLL VENOUS BLD VENIPUNCTURE: CPT | Performed by: STUDENT IN AN ORGANIZED HEALTH CARE EDUCATION/TRAINING PROGRAM

## 2022-07-06 PROCEDURE — 63600175 PHARM REV CODE 636 W HCPCS

## 2022-07-06 PROCEDURE — 11000001 HC ACUTE MED/SURG PRIVATE ROOM

## 2022-07-06 PROCEDURE — 80053 COMPREHEN METABOLIC PANEL: CPT | Performed by: STUDENT IN AN ORGANIZED HEALTH CARE EDUCATION/TRAINING PROGRAM

## 2022-07-06 PROCEDURE — 99233 PR SUBSEQUENT HOSPITAL CARE,LEVL III: ICD-10-PCS | Mod: GC,,, | Performed by: STUDENT IN AN ORGANIZED HEALTH CARE EDUCATION/TRAINING PROGRAM

## 2022-07-06 PROCEDURE — 99233 SBSQ HOSP IP/OBS HIGH 50: CPT | Mod: GC,,, | Performed by: STUDENT IN AN ORGANIZED HEALTH CARE EDUCATION/TRAINING PROGRAM

## 2022-07-06 PROCEDURE — 83735 ASSAY OF MAGNESIUM: CPT | Performed by: STUDENT IN AN ORGANIZED HEALTH CARE EDUCATION/TRAINING PROGRAM

## 2022-07-06 PROCEDURE — 1111F PR DISCHARGE MEDS RECONCILED W/ CURRENT OUTPATIENT MED LIST: ICD-10-PCS | Mod: CPTII,GC,, | Performed by: STUDENT IN AN ORGANIZED HEALTH CARE EDUCATION/TRAINING PROGRAM

## 2022-07-06 PROCEDURE — 84100 ASSAY OF PHOSPHORUS: CPT | Performed by: STUDENT IN AN ORGANIZED HEALTH CARE EDUCATION/TRAINING PROGRAM

## 2022-07-06 RX ORDER — POTASSIUM CHLORIDE 20 MEQ/1
40 TABLET, EXTENDED RELEASE ORAL ONCE
Status: COMPLETED | OUTPATIENT
Start: 2022-07-06 | End: 2022-07-06

## 2022-07-06 RX ORDER — SODIUM,POTASSIUM PHOSPHATES 280-250MG
2 POWDER IN PACKET (EA) ORAL ONCE
Status: COMPLETED | OUTPATIENT
Start: 2022-07-06 | End: 2022-07-06

## 2022-07-06 RX ORDER — ONDANSETRON 2 MG/ML
4 INJECTION INTRAMUSCULAR; INTRAVENOUS ONCE
Status: COMPLETED | OUTPATIENT
Start: 2022-07-06 | End: 2022-07-06

## 2022-07-06 RX ADMIN — ONDANSETRON 4 MG: 2 INJECTION INTRAMUSCULAR; INTRAVENOUS at 10:07

## 2022-07-06 RX ADMIN — METOROPROLOL TARTRATE 5 MG: 5 INJECTION, SOLUTION INTRAVENOUS at 08:07

## 2022-07-06 RX ADMIN — ENOXAPARIN SODIUM 40 MG: 100 INJECTION SUBCUTANEOUS at 04:07

## 2022-07-06 RX ADMIN — PROMETHAZINE HYDROCHLORIDE 12.5 MG: 12.5 TABLET ORAL at 12:07

## 2022-07-06 RX ADMIN — ONDANSETRON 4 MG: 2 INJECTION INTRAMUSCULAR; INTRAVENOUS at 08:07

## 2022-07-06 RX ADMIN — POTASSIUM CHLORIDE 40 MEQ: 1500 TABLET, EXTENDED RELEASE ORAL at 08:07

## 2022-07-06 RX ADMIN — METOROPROLOL TARTRATE 5 MG: 5 INJECTION, SOLUTION INTRAVENOUS at 09:07

## 2022-07-06 RX ADMIN — POTASSIUM & SODIUM PHOSPHATES POWDER PACK 280-160-250 MG 2 PACKET: 280-160-250 PACK at 08:07

## 2022-07-06 RX ADMIN — ONDANSETRON 4 MG: 2 INJECTION INTRAMUSCULAR; INTRAVENOUS at 02:07

## 2022-07-06 RX ADMIN — SODIUM CHLORIDE, SODIUM LACTATE, POTASSIUM CHLORIDE, AND CALCIUM CHLORIDE: .6; .31; .03; .02 INJECTION, SOLUTION INTRAVENOUS at 09:07

## 2022-07-06 NOTE — DISCHARGE SUMMARY
[facility]  General Surgery  Discharge Summary      Patient Name: Herber Camarillo  MRN: [id number]  Admission Date: 7/3/2022  Hospital Length of Stay: 3 days  Discharge Date and Time:  07/06/2022 7:59 AM  Attending Physician: Patric Lainez MD   Discharging Provider: Shankar Helm PA-C  Primary Care Provider: Fidelina Loyola MD     HPI:    Herber Camarillo is a 81 y.o. male with PMHx of HLD, hx of prostate cancer who presents to ED with abdominal pain and vomiting. This onset last night. He has vomited numerous times since then. His vomit is greenish. The pain is located in the epigastric area. He has never had similar symptoms previously. His last BM was yesterday. He did have recent L knee arthroplasty on 6/20/22, has not been on home narcotics.   Work up in ED demonstrates transition in pelvis with dilated stomach and small bowel. Labs unremarkable overall.   Prior abdominal surgeries include lap adam and open umbilical hernia repair.        * No surgery found *     Hospital Course:   Patient was admitted to the general surgery service. he was made NPO, given IVF, as well as pain and nausea control. He underwent conservative management of SBO with bowel rest, NGT decompression, and gastrograffin challenge. He passed GGC with contrast in colon, Diet was progressed. Had ROBF. The patient's clinical condition progressively improved. Patient was HDS throughout admission. By the time of discharge, he was meeting all discharge milestones, tolerating a diet without nausea or vomiting, pain was well controlled with oral medications, and he was ambulating. Voiding appropriately. On hospital day 3, the patient was discharged to home. Will continue outpatient PT. The patient will follow up in his PCP's clinic in 2 weeks. Discussed POC and ED precautions with patient. Patient verbalized understanding and is agreeable to plan. All questions answered.    Please see hospital and op notes for further detail regarding  patient's admission.    Patient's discharge was discussed with Dr. Lainez.       Consults (From admission, onward)        Status Ordering Provider     Inpatient consult to General Surgery  Once        Provider:  (Not yet assigned)    Completed YANN SAHNI        Physical Exam  Vitals and nursing note reviewed.   Constitutional:       General: He is not in acute distress.     Appearance: He is not ill-appearing or diaphoretic.      Comments: Room air   HENT:      Head: Normocephalic and atraumatic.      Mouth/Throat:      Mouth: Mucous membranes are moist.      Pharynx: Oropharynx is clear.   Eyes:      Extraocular Movements: Extraocular movements intact.      Conjunctiva/sclera: Conjunctivae normal.   Cardiovascular:      Rate and Rhythm: Normal rate.   Pulmonary:      Effort: Pulmonary effort is normal. No respiratory distress.   Abdominal:      General: There is no distension.      Palpations: Abdomen is soft.      Tenderness: There is no abdominal tenderness. There is no guarding or rebound.   Musculoskeletal:         General: No deformity.   Skin:     General: Skin is warm and dry.      Coloration: Skin is not jaundiced.   Neurological:      Mental Status: He is alert and oriented to person, place, and time.           Indwelling Lines/Drains at time of discharge:   Lines/Drains/Airways     Epidural Line  Duration                Perineural Analgesia/Anesthesia Assessment (using dermatomes) 06/20/22 0903 15 days                Significant Diagnostic Studies: Labs:   CMP   Recent Labs   Lab 07/05/22  0242 07/06/22  0446    138   K 3.7 3.2*   CL 94* 95   CO2 32* 31*   GLU 84 89   BUN 17 13   CREATININE 0.8 0.7   CALCIUM 8.7 8.1*   PROT 5.9* 5.1*   ALBUMIN 2.9* 2.7*   BILITOT 1.8* 1.4*   ALKPHOS 82 79   AST 48* 37   ALT 41 31   ANIONGAP 14 12   ESTGFRAFRICA >60.0 >60.0   EGFRNONAA >60.0 >60.0   , CBC   Recent Labs   Lab 07/05/22  0242 07/06/22  0446   WBC 7.94 5.21   HGB 13.9* 13.1*   HCT 41.7 38.3*   PLT  "268 232    and All labs within the past 24 hours have been reviewed  Radiology:  X-Ray Abdomen AP 1 View [[id number]] Resulted: 07/06/22 0755   Order Status: Completed Updated: 07/06/22 0758   Narrative:     EXAMINATION:   XR ABDOMEN AP 1 VIEW     CLINICAL HISTORY:   GGC 8 hours. Please comment on movement of contrast;     TECHNIQUE:   AP View(s) of the abdomen was performed.     COMPARISON:   July 5, 2022, 13:58 hours     FINDINGS:   Two overlapping images acquired in both or labeled "8 hours after Gastrografin challenge".     Contrast is seen scattered in portions of the colon, colon caliber not dilated.  Reconfirmed multiple dilated small bowel loops, maximum intraluminal dimension measurements of these in the 4.5 cm range or so.  No free air.  There are surgical clips in the right upper quadrant and about the pelvis.  Degenerative changes in spine.  Partially visualized right total hip procedure.  No acute bony finding.    Impression:       As above.       Electronically signed by: Lavelle Lam   Date: 07/06/2022   Time: 07:55   X-Ray Abdomen AP 1 View [[id number]] Resulted: 07/05/22 1441   Order Status: Completed Updated: 07/05/22 1443   Narrative:     EXAMINATION:   XR ABDOMEN AP 1 VIEW     CLINICAL HISTORY:   GGC 4 hours. Please comment on movement of contrast;     TECHNIQUE:   AP View(s) of the abdomen was performed.     COMPARISON:   07/03/2022     FINDINGS:   Two views abdomen supine.     Multiple dilated small bowel loops remain throughout the abdomen and pelvis.  Contrast is seen within the cecum extending to the ascending colon.  There is contrast within a few relatively decompressed distal small bowel loops at the level of the terminal ileum.  No discrete findings to suggest pneumatosis.  Surgical changes are noted of the pelvis.  Surgical changes project over the right upper quadrant.  The osseous structures are intact.    Impression:       As above       Electronically signed by: Martinez Carey MD "   Date: 07/05/2022   Time: 14:41   X-Ray Abdomen AP 1 View [[id number]] Resulted: 07/03/22 1648   Order Status: Completed Updated: 07/03/22 1651   Narrative:     EXAMINATION:   XR ABDOMEN AP 1 VIEW     CLINICAL HISTORY:   ng;     TECHNIQUE:   Single frontal view of the lower chest and upper abdomen     COMPARISON:   Earlier same day     FINDINGS:   NG tube tip remains in satisfactory position at the level of the gastric fundus.     There are multiple distended loops of small bowel within the visualized portions of the upper abdomen and multiple surgical clips in the right side of the abdomen.    Impression:       Satisfactory position of NG tube     Stable small bowel distension       Electronically signed by: Ludin Saldana Jr   Date: 07/03/2022   Time: 16:48   X-Ray Abdomen AP 1 View (KUB) [[id number]] Resulted: 07/03/22 1327   Order Status: Completed Updated: 07/03/22 1330   Narrative:     EXAMINATION:   XR ABDOMEN AP 1 VIEW     CLINICAL HISTORY:   Encounter for other specified special examinations     TECHNIQUE:   AP View(s) of the abdomen was performed.     COMPARISON:   CT abdomen and pelvis same day and abdominal radiograph 11/23/2013     FINDINGS:   Lower abdomen and pelvis and lateral left abdomen not included in field of view.     Enteric tube side port and tip projected over the left upper quadrant/moderately distended gastric bubble, presumably within the stomach.  Several scattered dilated small bowel loops at the mid to upper abdomen not simply changed from CT earlier same day.  No large amount of free air seen.  Imaged lung bases are free of large consolidation.  No acute osseous process seen.    Impression:       As above.       Electronically signed by: Hugo Watt MD   Date: 07/03/2022   Time: 13:27   CT Abdomen Pelvis With Contrast [[id number]] Resulted: 07/03/22 1044   Order Status: Completed Updated: 07/03/22 1047   Narrative:     EXAMINATION:   CT ABDOMEN PELVIS WITH CONTRAST     CLINICAL  HISTORY:   Bowel obstruction suspected;     TECHNIQUE:   Low dose axial images, sagittal and coronal reformations were obtained from the lung bases to the pubic symphysis following the IV administration of 100 mL of Omnipaque 350     COMPARISON:   Ultrasound of the abdomen performed 10/22/2019.     FINDINGS:   Lower chest: Coronary artery calcifications.  Atelectasis and scar suggested at the lung bases.  Nonspecific bronchial wall thickening.     Liver: Unremarkable.     Gallbladder and bile ducts: Status post cholecystectomy.  Prominent caliber intrahepatic and extrahepatic bile ducts felt to reflect post cholecystectomy status.     Pancreas: Unremarkable.     Spleen: Unremarkable.     Adrenals: Unremarkable.     Kidneys: Subcentimeter hypoattenuating lesions in the kidneys too small to definitely characterize.     Lymph nodes: No abdominal or pelvic lymphadenopathy.     Bowel and mesentery: Relatively diffuse dilatation of fluid-filled small bowel loops measuring up to 4 cm with suggested transition to relatively decompressed more distal small bowel loops in the right mid abdomen (coronal reformat series 601, image 112; axial series 2, image 100).  Small hiatal hernia.  Nonspecific wall thickening of the visualized distal esophagus, finding which may be seen in the setting of esophagitis.  Moderate distention of stomach with fluid and ingested material.  Colonic diverticulosis without definite evidence of acute diverticulitis.  Submucosal hypoattenuation in the region of the cecum and proximal right colon could indicate sequela of chronic nonspecific inflammation.  Normal caliber appendix.     Abdominal aorta: Nonaneurysmal.  Atherosclerosis.     Inferior vena cava: Unremarkable.     Free fluid or free air: None.     Pelvis: Limited assessment due to artifact from orthopedic hardware.     Body wall: Remote operative sequela suggested in the anterior abdominal wall.  Asymmetric subcutaneous edema involving the  left lateral abdominal wall extending to the visualized proximal thigh.     Bones: No definite acute abnormality.  Osseous demineralization.  Degenerative findings involving the spine, sacroiliac joints, pubic symphysis, and left hip joint.  Status post right total hip arthroplasty.  Suggested benign-appearing lesion left iliac bone    Impression:       1. Dilated fluid-filled small bowel loops measure up to 4 cm with suggested transition point to relatively decompressed more distal small bowel loops in the right mid abdomen, as described.  Findings concerning for acute small bowel obstruction, with ileus additionally considered.   2. Additional details, as provided in the body of report.            Pending Diagnostic Studies:     None          Final Active Diagnoses:      Problems Resolved During this Admission:    Diagnosis Date Noted Date Resolved POA    PRINCIPAL PROBLEM:  SBO (small bowel obstruction) [K56.609] 07/03/2022 07/06/2022 Yes        Discharged Condition: good    Disposition: Home or Self Care    Follow Up:   Follow-up Information     Fidelina Loyola MD Follow up in 2 week(s).    Specialty: Internal Medicine  Why: Hospital follow up  Contact information:  [address]  [place] [address] [phone]                         Patient Instructions:      Diet Cardiac     Notify your health care provider if you experience any of the following:  increased confusion or weakness     Notify your health care provider if you experience any of the following:  persistent dizziness, light-headedness, or visual disturbances     Notify your health care provider if you experience any of the following:  worsening rash     Notify your health care provider if you experience any of the following:  severe persistent headache     Notify your health care provider if you experience any of the following:  difficulty breathing or increased cough     Notify your health care provider if you experience any of the  following:  redness, tenderness, or signs of infection (pain, swelling, redness, odor or green/yellow discharge around incision site)     Notify your health care provider if you experience any of the following:  severe uncontrolled pain     Notify your health care provider if you experience any of the following:  persistent nausea and vomiting or diarrhea     Notify your health care provider if you experience any of the following:  temperature >100.4     Activity as tolerated       Medications:  Reconciled Home Medications:      Medication List      CONTINUE taking these medications    ARTHRITIS PAIN RELIEF (ACETAM) 650 MG Tbsr  Generic drug: acetaminophen  Take 1 tablet (650 mg total) by mouth every 8 (eight) hours.     aspirin 81 MG EC tablet  Commonly known as: ECOTRIN  Take 1 tablet (81 mg total) by mouth 2 (two) times a day.     atorvastatin 20 MG tablet  Commonly known as: LIPITOR  Take 1 tablet (20 mg total) by mouth once daily.     b complex vitamins tablet  Take 1 tablet by mouth once daily.     docusate sodium 100 MG capsule  Commonly known as: COLACE  Take 1 capsule (100 mg total) by mouth 2 (two) times daily.     meloxicam 15 MG tablet  Commonly known as: MOBIC  Take 1 tablet (15 mg total) by mouth once daily.     metoprolol succinate 25 MG 24 hr tablet  Commonly known as: TOPROL-XL  Take 1 tablet (25 mg total) by mouth once daily.     omega-3 fatty acids-vitamin E 1,000 mg Cap  Take by mouth.     oxyCODONE 5 MG immediate release tablet  Commonly known as: ROXICODONE  Take 1-2 tabs every 4-6 hours as needed for pain     pregabalin 75 MG capsule  Commonly known as: LYRICA  Take 1 capsule (75 mg total) by mouth 2 (two) times daily.            Shankar Helm PA-C           Patient was seen and examined on the date of discharge and determined to be suitable for discharge.  Total time spent preparing discharge services: 20 minutes.  Time was spent speaking with consultants and case management, reviewing  records, and/or discussing the plan of care with patient/family.        Shankar Helm PA-C  General Surgery   [facility]

## 2022-07-06 NOTE — TELEPHONE ENCOUNTER
----- Message from Bart Collins sent at 7/6/2022  3:18 PM CDT -----  Contact: [phone]  Pt daughter Ashanti would like a call back about her dad being in the ER pt daughter has some questions.

## 2022-07-06 NOTE — TELEPHONE ENCOUNTER
I spoke to daughter- all questions answered.   He may need surgery but waiting to see if he responds to this NG tube

## 2022-07-06 NOTE — ASSESSMENT & PLAN NOTE
Patient is an 81 year old male with PMHx of HLD, CAD, OA, prostate cancer presenting with SBO. Clinically stable     - NPO  - STAT KUB ordered  - Gastrograffin challenge today with KUBs at 4 and 8 hours with contrast in colon  - Pain meds prn  - Antiemetics prn  - Encourage ambulation. Had recent knee surgery; will have PT on board.   - Holding most home neds in setting of acute disease and NPO. Home metprolol converted to IV  - Daily labs  - Replete electrolytes PRN  - DVT prophylaxis (SCDs and lovenox)  - OOB, ambulate    Dispo: not yet medical stable for dc.

## 2022-07-06 NOTE — PLAN OF CARE
Pt is AAOX4,VSS. Pt is progressing towards plan of care goals. Pain management has been assessed. Pt does not report any pain, reassessed throughout shift. No reports of N/V. Tolerating clear liquids.  SCDs in place with frequent checks for skin breakdown. Ambulates with assistance. Fall precautions in place, no report of falls.  Safety measures are in place bed in lowest position, wheels locked, call light within reach.

## 2022-07-06 NOTE — PROGRESS NOTES
[facility]  General Surgery  Progress Note    Subjective:     History of Present Illness:  Herber Camarillo is a 81 y.o. male with PMHx of HLD, hx of prostate cancer who presents to ED with abdominal pain and vomiting. This onset last night. He has vomited numerous times since then. His vomit is greenish. The pain is located in the epigastric area. He has never had similar symptoms previously. His last BM was yesterday. He did have recent L knee arthroplasty on 6/20/22, has not been on home narcotics.   Work up in ED demonstrates transition in pelvis with dilated stomach and small bowel. Labs unremarkable overall.   Prior abdominal surgeries include lap adam and open umbilical hernia repair.             Post-Op Info:  * No surgery found *         Interval History: NAEON. Afebrile. HDS. Felt great this AM, planned on discharging home as he tolerated diet and had multiple Bms. Then had n/v after breakfast.       Medications:  Continuous Infusions:   lactated ringers 75 mL/hr at 07/05/22 1621     Scheduled Meds:   enoxaparin  40 mg Subcutaneous Daily    metoprolol  5 mg Intravenous Q12H    ondansetron  4 mg Intravenous Once     PRN Meds:acetaminophen, HYDROmorphone, HYDROmorphone, ondansetron, promethazine, sodium chloride 0.9%     Review of patient's allergies indicates:   Allergen Reactions    Novocain [procaine] Shortness Of Breath           Objective:     Vital Signs (Most Recent):  Temp: 97.4 °F (36.3 °C) (07/06/22 1212)  Pulse: 88 (07/06/22 1212)  Resp: 16 (07/06/22 1212)  BP: (!) 151/68 (07/06/22 1212)  SpO2: 97 % (07/06/22 1212)   Vital Signs (24h Range):  Temp:  [96 °F (35.6 °C)-98.5 °F (36.9 °C)] 97.4 °F (36.3 °C)  Pulse:  [78-93] 88  Resp:  [16-18] 16  SpO2:  [92 %-97 %] 97 %  BP: (148-162)/(68-70) 151/68     Weight: 86.2 kg (190 lb)  Body mass index is 29.75 kg/m².    Intake/Output - Last 3 Shifts         07/04 0700 07/05 0659 07/05 0700 07/06 0659 07/06 0700 07/07 0659    P.O.   350    IV  Piggyback       Total Intake(mL/kg)   350 (4.1)    Urine (mL/kg/hr) 650 (0.3)      Drains 1650 300     Total Output 2300 300     Net -2300 -300 +350           Urine Occurrence 1 x  1 x    Stool Occurrence   1 x    Emesis Occurrence   2 x            Physical Exam  Vitals and nursing note reviewed.   Constitutional:       General: He is not in acute distress.     Comments: Room air   HENT:      Head: Normocephalic and atraumatic.      Nose: Nose normal.      Mouth/Throat:      Mouth: Mucous membranes are moist.      Pharynx: Oropharynx is clear.   Eyes:      Extraocular Movements: Extraocular movements intact.      Conjunctiva/sclera: Conjunctivae normal.   Cardiovascular:      Rate and Rhythm: Normal rate.   Pulmonary:      Effort: Pulmonary effort is normal. No respiratory distress.   Abdominal:      Palpations: Abdomen is soft.      Tenderness: There is no guarding or rebound.      Comments: Soft, non-tender. Less distension than day prior. No peritonitic signs   Musculoskeletal:         General: No deformity.   Skin:     General: Skin is warm and dry.   Neurological:      General: No focal deficit present.      Mental Status: He is alert.       Significant Labs:  I have reviewed all pertinent lab results within the past 24 hours.  CBC:   Recent Labs   Lab 07/06/22  0446   WBC 5.21   RBC 4.21*   HGB 13.1*   HCT 38.3*      MCV 91   MCH 31.1*   MCHC 34.2       CMP:   Recent Labs   Lab 07/06/22  0446   GLU 89   CALCIUM 8.1*   ALBUMIN 2.7*   PROT 5.1*      K 3.2*   CO2 31*   CL 95   BUN 13   CREATININE 0.7   ALKPHOS 79   ALT 31   AST 37   BILITOT 1.4*         Significant Diagnostics:  I have reviewed all pertinent imaging results/findings within the past 24 hours.    Assessment/Plan:     * SBO (small bowel obstruction)  Patient is an 81 year old male with PMHx of HLD, CAD, OA, prostate cancer presenting with SBO. Clinically stable     - NPO  - STAT KUB ordered  - Gastrograffin challenge today with KUBs at  4 and 8 hours with contrast in colon  - Pain meds prn  - Antiemetics prn  - Encourage ambulation. Had recent knee surgery; will have PT on board.   - Holding most home neds in setting of acute disease and NPO. Home metprolol converted to IV  - Daily labs  - Replete electrolytes PRN  - DVT prophylaxis (SCDs and lovenox)  - OOB, ambulate    Dispo: not yet medical stable for dc.       Case discussed with Dr. Lainez.      KHUSHBOO JosephC  General Surgery  [facility]

## 2022-07-06 NOTE — TELEPHONE ENCOUNTER
robles    Spoke to patient's daughter stated the patient is in  hospital with a intestinal obstruction and may have surgery tonight or tomorrow

## 2022-07-07 LAB
ALBUMIN SERPL BCP-MCNC: 2.5 G/DL (ref 3.5–5.2)
ALP SERPL-CCNC: 72 U/L (ref 55–135)
ALT SERPL W/O P-5'-P-CCNC: 31 U/L (ref 10–44)
ANION GAP SERPL CALC-SCNC: 10 MMOL/L (ref 8–16)
AST SERPL-CCNC: 36 U/L (ref 10–40)
BASOPHILS # BLD AUTO: 0.03 K/UL (ref 0–0.2)
BASOPHILS NFR BLD: 0.5 % (ref 0–1.9)
BILIRUB SERPL-MCNC: 1.1 MG/DL (ref 0.1–1)
BUN SERPL-MCNC: 13 MG/DL (ref 8–23)
CALCIUM SERPL-MCNC: 8.4 MG/DL (ref 8.7–10.5)
CHLORIDE SERPL-SCNC: 96 MMOL/L (ref 95–110)
CO2 SERPL-SCNC: 34 MMOL/L (ref 23–29)
CREAT SERPL-MCNC: 0.7 MG/DL (ref 0.5–1.4)
DIFFERENTIAL METHOD: ABNORMAL
EOSINOPHIL # BLD AUTO: 0.2 K/UL (ref 0–0.5)
EOSINOPHIL NFR BLD: 3.9 % (ref 0–8)
ERYTHROCYTE [DISTWIDTH] IN BLOOD BY AUTOMATED COUNT: 12.1 % (ref 11.5–14.5)
EST. GFR  (AFRICAN AMERICAN): >60 ML/MIN/1.73 M^2
EST. GFR  (NON AFRICAN AMERICAN): >60 ML/MIN/1.73 M^2
GLUCOSE SERPL-MCNC: 70 MG/DL (ref 70–110)
HCT VFR BLD AUTO: 39.7 % (ref 40–54)
HGB BLD-MCNC: 13 G/DL (ref 14–18)
IMM GRANULOCYTES # BLD AUTO: 0.02 K/UL (ref 0–0.04)
IMM GRANULOCYTES NFR BLD AUTO: 0.3 % (ref 0–0.5)
LYMPHOCYTES # BLD AUTO: 1.3 K/UL (ref 1–4.8)
LYMPHOCYTES NFR BLD: 20.9 % (ref 18–48)
MAGNESIUM SERPL-MCNC: 2 MG/DL (ref 1.6–2.6)
MCH RBC QN AUTO: 31.5 PG (ref 27–31)
MCHC RBC AUTO-ENTMCNC: 32.7 G/DL (ref 32–36)
MCV RBC AUTO: 96 FL (ref 82–98)
MONOCYTES # BLD AUTO: 1 K/UL (ref 0.3–1)
MONOCYTES NFR BLD: 15.9 % (ref 4–15)
NEUTROPHILS # BLD AUTO: 3.6 K/UL (ref 1.8–7.7)
NEUTROPHILS NFR BLD: 58.5 % (ref 38–73)
NRBC BLD-RTO: 0 /100 WBC
PHOSPHATE SERPL-MCNC: 2.2 MG/DL (ref 2.7–4.5)
PLATELET # BLD AUTO: 268 K/UL (ref 150–450)
PMV BLD AUTO: 10.6 FL (ref 9.2–12.9)
POTASSIUM SERPL-SCNC: 3.4 MMOL/L (ref 3.5–5.1)
PROT SERPL-MCNC: 5.5 G/DL (ref 6–8.4)
RBC # BLD AUTO: 4.13 M/UL (ref 4.6–6.2)
SODIUM SERPL-SCNC: 140 MMOL/L (ref 136–145)
WBC # BLD AUTO: 6.11 K/UL (ref 3.9–12.7)

## 2022-07-07 PROCEDURE — 94761 N-INVAS EAR/PLS OXIMETRY MLT: CPT

## 2022-07-07 PROCEDURE — 25500020 PHARM REV CODE 255: Performed by: STUDENT IN AN ORGANIZED HEALTH CARE EDUCATION/TRAINING PROGRAM

## 2022-07-07 PROCEDURE — 80053 COMPREHEN METABOLIC PANEL: CPT | Performed by: STUDENT IN AN ORGANIZED HEALTH CARE EDUCATION/TRAINING PROGRAM

## 2022-07-07 PROCEDURE — 11000001 HC ACUTE MED/SURG PRIVATE ROOM

## 2022-07-07 PROCEDURE — 63600175 PHARM REV CODE 636 W HCPCS: Performed by: STUDENT IN AN ORGANIZED HEALTH CARE EDUCATION/TRAINING PROGRAM

## 2022-07-07 PROCEDURE — 25000003 PHARM REV CODE 250: Performed by: STUDENT IN AN ORGANIZED HEALTH CARE EDUCATION/TRAINING PROGRAM

## 2022-07-07 PROCEDURE — 84100 ASSAY OF PHOSPHORUS: CPT | Performed by: STUDENT IN AN ORGANIZED HEALTH CARE EDUCATION/TRAINING PROGRAM

## 2022-07-07 PROCEDURE — 85025 COMPLETE CBC W/AUTO DIFF WBC: CPT | Performed by: STUDENT IN AN ORGANIZED HEALTH CARE EDUCATION/TRAINING PROGRAM

## 2022-07-07 PROCEDURE — 83735 ASSAY OF MAGNESIUM: CPT | Performed by: STUDENT IN AN ORGANIZED HEALTH CARE EDUCATION/TRAINING PROGRAM

## 2022-07-07 PROCEDURE — 36415 COLL VENOUS BLD VENIPUNCTURE: CPT | Performed by: STUDENT IN AN ORGANIZED HEALTH CARE EDUCATION/TRAINING PROGRAM

## 2022-07-07 PROCEDURE — 99233 PR SUBSEQUENT HOSPITAL CARE,LEVL III: ICD-10-PCS | Mod: GC,,, | Performed by: STUDENT IN AN ORGANIZED HEALTH CARE EDUCATION/TRAINING PROGRAM

## 2022-07-07 PROCEDURE — 99233 SBSQ HOSP IP/OBS HIGH 50: CPT | Mod: GC,,, | Performed by: STUDENT IN AN ORGANIZED HEALTH CARE EDUCATION/TRAINING PROGRAM

## 2022-07-07 RX ADMIN — METOROPROLOL TARTRATE 5 MG: 5 INJECTION, SOLUTION INTRAVENOUS at 09:07

## 2022-07-07 RX ADMIN — ONDANSETRON 4 MG: 2 INJECTION INTRAMUSCULAR; INTRAVENOUS at 04:07

## 2022-07-07 RX ADMIN — POTASSIUM PHOSPHATE, MONOBASIC AND POTASSIUM PHOSPHATE, DIBASIC 15 MMOL: 224; 236 INJECTION, SOLUTION, CONCENTRATE INTRAVENOUS at 09:07

## 2022-07-07 RX ADMIN — DIATRIZOATE MEGLUMINE AND DIATRIZOATE SODIUM 50 ML: 660; 100 LIQUID ORAL; RECTAL at 05:07

## 2022-07-07 RX ADMIN — ENOXAPARIN SODIUM 40 MG: 100 INJECTION SUBCUTANEOUS at 05:07

## 2022-07-07 NOTE — PLAN OF CARE
Pt is Aox4, VSS. Pain assessed and managed. Intermittent nausea and vomiting, controlled with prn medication. NG tube to intermittent suction in place, output documented.  Pt is progressing towards goals. SCD's in place with frequent checks for skin breakdown. Fall precautions in place, no reported falls. IV site CDI. Elimination schedule in place. Ambulates with 1x assist. Safety precautions in place bed in lowest position,wheels locked, call light within reach,family at bedside, id band and allergy band on, and clutter free environment.

## 2022-07-07 NOTE — SUBJECTIVE & OBJECTIVE
Interval History: NAEON. Afebrile. HDS. NGT replaced yesterday after episodes of n/v with solid food. 1800mL out since replaced. Patient reports feeling better this morning, denies nausea. Thinks he is passing flatus. Wife at bedside.       Medications:  Continuous Infusions:   lactated ringers 75 mL/hr at 07/06/22 2136     Scheduled Meds:   diatrizoate meglumineand-diatrizoate sodium  50 mL Per NG tube Once    enoxaparin  40 mg Subcutaneous Daily    metoprolol  5 mg Intravenous Q12H     PRN Meds:acetaminophen, HYDROmorphone, HYDROmorphone, ondansetron, promethazine, sodium chloride 0.9%     Review of patient's allergies indicates:   Allergen Reactions    Novocain [procaine] Shortness Of Breath           Objective:     Vital Signs (Most Recent):  Temp: 97.6 °F (36.4 °C) (07/07/22 0457)  Pulse: 73 (07/07/22 0457)  Resp: 18 (07/07/22 0457)  BP: (!) 158/70 (07/07/22 0457)  SpO2: (!) 92 % (07/07/22 0457)   Vital Signs (24h Range):  Temp:  [97.4 °F (36.3 °C)-98.7 °F (37.1 °C)] 97.6 °F (36.4 °C)  Pulse:  [73-94] 73  Resp:  [16-21] 18  SpO2:  [92 %-97 %] 92 %  BP: (146-158)/(68-82) 158/70     Weight: 86.2 kg (190 lb)  Body mass index is 29.75 kg/m².    Intake/Output - Last 3 Shifts         07/05 0700  07/06 0659 07/06 0700  07/07 0659 07/07 0700  07/08 0659    P.O.  350     Total Intake(mL/kg)  350 (4.1)     Urine (mL/kg/hr)  0 (0)     Emesis/NG output  0     Drains 300 1800     Stool  0     Total Output 300 1800     Net -300 -1450            Urine Occurrence  2 x     Stool Occurrence  301 x     Emesis Occurrence  4 x             Physical Exam  Vitals and nursing note reviewed.   Constitutional:       General: He is not in acute distress.     Comments: Room air  NGT to LIWS with bilious output   HENT:      Head: Normocephalic and atraumatic.      Nose: Nose normal.      Mouth/Throat:      Mouth: Mucous membranes are moist.      Pharynx: Oropharynx is clear.   Eyes:      Extraocular Movements: Extraocular movements intact.       Conjunctiva/sclera: Conjunctivae normal.   Cardiovascular:      Rate and Rhythm: Normal rate.   Pulmonary:      Effort: Pulmonary effort is normal. No respiratory distress.   Abdominal:      Palpations: Abdomen is soft.      Tenderness: There is no guarding or rebound.      Comments: Soft, non-tender. No TTP. Mildly distended. No peritonitic signs   Musculoskeletal:         General: No deformity.   Skin:     General: Skin is warm and dry.   Neurological:      General: No focal deficit present.      Mental Status: He is alert.       Significant Labs:  I have reviewed all pertinent lab results within the past 24 hours.  CBC:   Recent Labs   Lab 07/07/22 0449   WBC 6.11   RBC 4.13*   HGB 13.0*   HCT 39.7*      MCV 96   MCH 31.5*   MCHC 32.7       CMP:   Recent Labs   Lab 07/07/22 0449   GLU 70   CALCIUM 8.4*   ALBUMIN 2.5*   PROT 5.5*      K 3.4*   CO2 34*   CL 96   BUN 13   CREATININE 0.7   ALKPHOS 72   ALT 31   AST 36   BILITOT 1.1*         Significant Diagnostics:  I have reviewed all pertinent imaging results/findings within the past 24 hours.

## 2022-07-07 NOTE — NURSING
Contrast given to pt @1750 and tube clamped afterwards. Notified pt to report feeling sick. Notified xray about time dose was given and to reschedule 1st xray a hour later.     VSS. No complaints from pt at this time. Family present at bedside.

## 2022-07-07 NOTE — ASSESSMENT & PLAN NOTE
Patient is an 81 year old male with PMHx of HLD, CAD, OA, prostate cancer presenting with SBO. Clinically stable     - NPO  - NGT to LIWS  - Gastrograffin with contrast in the colon and ROBF but then had return of n/v. Will continue NGT decompression for now and discuss potential operative intervention vs continued conservative treatment with staff  - Pain meds and antiemetics prn  - Encourage ambulation. Had recent knee surgery; will have PT on board.   - Holding most home neds in setting of acute disease and NPO. Home metprolol converted to IV  - Daily labs  - Replete electrolytes PRN  - DVT prophylaxis (SCDs and lovenox)  - OOB, ambulate    Will discuss with staff.    Dispo: not yet medical stable for dc.

## 2022-07-08 PROBLEM — E87.6 HYPOKALEMIA: Status: ACTIVE | Noted: 2022-07-08

## 2022-07-08 LAB
ALBUMIN SERPL BCP-MCNC: 2.7 G/DL (ref 3.5–5.2)
ALP SERPL-CCNC: 75 U/L (ref 55–135)
ALT SERPL W/O P-5'-P-CCNC: 28 U/L (ref 10–44)
ANION GAP SERPL CALC-SCNC: 13 MMOL/L (ref 8–16)
AST SERPL-CCNC: 30 U/L (ref 10–40)
BASOPHILS # BLD AUTO: 0.04 K/UL (ref 0–0.2)
BASOPHILS NFR BLD: 0.6 % (ref 0–1.9)
BILIRUB SERPL-MCNC: 1.1 MG/DL (ref 0.1–1)
BUN SERPL-MCNC: 13 MG/DL (ref 8–23)
CALCIUM SERPL-MCNC: 8.3 MG/DL (ref 8.7–10.5)
CHLORIDE SERPL-SCNC: 97 MMOL/L (ref 95–110)
CO2 SERPL-SCNC: 30 MMOL/L (ref 23–29)
CREAT SERPL-MCNC: 0.7 MG/DL (ref 0.5–1.4)
DIFFERENTIAL METHOD: ABNORMAL
EOSINOPHIL # BLD AUTO: 0.3 K/UL (ref 0–0.5)
EOSINOPHIL NFR BLD: 3.9 % (ref 0–8)
ERYTHROCYTE [DISTWIDTH] IN BLOOD BY AUTOMATED COUNT: 12 % (ref 11.5–14.5)
EST. GFR  (AFRICAN AMERICAN): >60 ML/MIN/1.73 M^2
EST. GFR  (NON AFRICAN AMERICAN): >60 ML/MIN/1.73 M^2
GLUCOSE SERPL-MCNC: 69 MG/DL (ref 70–110)
HCT VFR BLD AUTO: 39.5 % (ref 40–54)
HGB BLD-MCNC: 13.2 G/DL (ref 14–18)
IMM GRANULOCYTES # BLD AUTO: 0.02 K/UL (ref 0–0.04)
IMM GRANULOCYTES NFR BLD AUTO: 0.3 % (ref 0–0.5)
LYMPHOCYTES # BLD AUTO: 1.4 K/UL (ref 1–4.8)
LYMPHOCYTES NFR BLD: 20.6 % (ref 18–48)
MAGNESIUM SERPL-MCNC: 1.9 MG/DL (ref 1.6–2.6)
MCH RBC QN AUTO: 31.4 PG (ref 27–31)
MCHC RBC AUTO-ENTMCNC: 33.4 G/DL (ref 32–36)
MCV RBC AUTO: 94 FL (ref 82–98)
MONOCYTES # BLD AUTO: 0.9 K/UL (ref 0.3–1)
MONOCYTES NFR BLD: 13.9 % (ref 4–15)
NEUTROPHILS # BLD AUTO: 4.1 K/UL (ref 1.8–7.7)
NEUTROPHILS NFR BLD: 60.7 % (ref 38–73)
NRBC BLD-RTO: 0 /100 WBC
PHOSPHATE SERPL-MCNC: 2.7 MG/DL (ref 2.7–4.5)
PLATELET # BLD AUTO: 304 K/UL (ref 150–450)
PMV BLD AUTO: 10.3 FL (ref 9.2–12.9)
POTASSIUM SERPL-SCNC: 3.2 MMOL/L (ref 3.5–5.1)
PROT SERPL-MCNC: 5.6 G/DL (ref 6–8.4)
RBC # BLD AUTO: 4.21 M/UL (ref 4.6–6.2)
SODIUM SERPL-SCNC: 140 MMOL/L (ref 136–145)
WBC # BLD AUTO: 6.69 K/UL (ref 3.9–12.7)

## 2022-07-08 PROCEDURE — 36415 COLL VENOUS BLD VENIPUNCTURE: CPT | Performed by: STUDENT IN AN ORGANIZED HEALTH CARE EDUCATION/TRAINING PROGRAM

## 2022-07-08 PROCEDURE — 63600175 PHARM REV CODE 636 W HCPCS: Performed by: STUDENT IN AN ORGANIZED HEALTH CARE EDUCATION/TRAINING PROGRAM

## 2022-07-08 PROCEDURE — 25000003 PHARM REV CODE 250: Performed by: STUDENT IN AN ORGANIZED HEALTH CARE EDUCATION/TRAINING PROGRAM

## 2022-07-08 PROCEDURE — 84100 ASSAY OF PHOSPHORUS: CPT | Performed by: STUDENT IN AN ORGANIZED HEALTH CARE EDUCATION/TRAINING PROGRAM

## 2022-07-08 PROCEDURE — 99233 PR SUBSEQUENT HOSPITAL CARE,LEVL III: ICD-10-PCS | Mod: GC,,, | Performed by: STUDENT IN AN ORGANIZED HEALTH CARE EDUCATION/TRAINING PROGRAM

## 2022-07-08 PROCEDURE — 80053 COMPREHEN METABOLIC PANEL: CPT | Performed by: STUDENT IN AN ORGANIZED HEALTH CARE EDUCATION/TRAINING PROGRAM

## 2022-07-08 PROCEDURE — 11000001 HC ACUTE MED/SURG PRIVATE ROOM

## 2022-07-08 PROCEDURE — 63600175 PHARM REV CODE 636 W HCPCS

## 2022-07-08 PROCEDURE — 85025 COMPLETE CBC W/AUTO DIFF WBC: CPT | Performed by: STUDENT IN AN ORGANIZED HEALTH CARE EDUCATION/TRAINING PROGRAM

## 2022-07-08 PROCEDURE — 99233 SBSQ HOSP IP/OBS HIGH 50: CPT | Mod: GC,,, | Performed by: STUDENT IN AN ORGANIZED HEALTH CARE EDUCATION/TRAINING PROGRAM

## 2022-07-08 PROCEDURE — 83735 ASSAY OF MAGNESIUM: CPT | Performed by: STUDENT IN AN ORGANIZED HEALTH CARE EDUCATION/TRAINING PROGRAM

## 2022-07-08 PROCEDURE — 97110 THERAPEUTIC EXERCISES: CPT

## 2022-07-08 RX ORDER — DEXTROSE MONOHYDRATE AND SODIUM CHLORIDE 5; .9 G/100ML; G/100ML
INJECTION, SOLUTION INTRAVENOUS CONTINUOUS
Status: DISCONTINUED | OUTPATIENT
Start: 2022-07-08 | End: 2022-07-08

## 2022-07-08 RX ORDER — POTASSIUM CHLORIDE 7.45 MG/ML
10 INJECTION INTRAVENOUS
Status: COMPLETED | OUTPATIENT
Start: 2022-07-08 | End: 2022-07-08

## 2022-07-08 RX ADMIN — METOROPROLOL TARTRATE 5 MG: 5 INJECTION, SOLUTION INTRAVENOUS at 10:07

## 2022-07-08 RX ADMIN — POTASSIUM CHLORIDE 10 MEQ: 10 INJECTION INTRAVENOUS at 01:07

## 2022-07-08 RX ADMIN — POTASSIUM CHLORIDE 10 MEQ: 10 INJECTION INTRAVENOUS at 12:07

## 2022-07-08 RX ADMIN — POTASSIUM CHLORIDE 10 MEQ: 10 INJECTION INTRAVENOUS at 09:07

## 2022-07-08 RX ADMIN — DEXTROSE MONOHYDRATE AND SODIUM CHLORIDE: 5; .9 INJECTION, SOLUTION INTRAVENOUS at 06:07

## 2022-07-08 RX ADMIN — POTASSIUM CHLORIDE 10 MEQ: 10 INJECTION INTRAVENOUS at 03:07

## 2022-07-08 RX ADMIN — METOROPROLOL TARTRATE 5 MG: 5 INJECTION, SOLUTION INTRAVENOUS at 09:07

## 2022-07-08 RX ADMIN — ENOXAPARIN SODIUM 40 MG: 100 INJECTION SUBCUTANEOUS at 05:07

## 2022-07-08 RX ADMIN — SODIUM CHLORIDE, SODIUM LACTATE, POTASSIUM CHLORIDE, AND CALCIUM CHLORIDE: .6; .31; .03; .02 INJECTION, SOLUTION INTRAVENOUS at 12:07

## 2022-07-08 RX ADMIN — POTASSIUM CHLORIDE 10 MEQ: 10 INJECTION INTRAVENOUS at 08:07

## 2022-07-08 RX ADMIN — POTASSIUM CHLORIDE 10 MEQ: 10 INJECTION INTRAVENOUS at 10:07

## 2022-07-08 NOTE — PLAN OF CARE
Pt is Aox4, VSS. Pain assessed and managed with PRN medication. NG tube clamped. No reports of nausea or vomiting. Pt is progressing towards goals. Pt refused SCD's.  Fall precautions in place, no reported falls. IV site CDI and infusing. Elimination schedule in place. Ambulates with 1x assist. Safety precautions in place bed in lowest position,wheels locked, call light within reach, spouse remains at bedside, id band and allergy band on, and clutter free environment.

## 2022-07-08 NOTE — ASSESSMENT & PLAN NOTE
Patient is an 81 year old male with PMHx of HLD, CAD, OA, prostate cancer presenting with SBO. Clinically stable     - NPO  - NGT to LIWS. NGT clamped overnight, will unclamp this AM and watch output. If output remains low and patient clinically feeling well with continued ROBF, will clamp and proceed with trial of CLD   - Gastrograffin with contrast in the colon and ROBF but then had return of n/v. Repeat GGC with progression and again ROBF.   - Pain meds and antiemetics prn  - Encourage ambulation. Had recent knee surgery; will have PT on board.   - Holding most home neds in setting of acute disease and NPO. Home metprolol converted to IV  - Daily labs  - Replete electrolytes PRN  - DVT prophylaxis (SCDs and lovenox)  - OOB, ambulate    Will discuss with staff.    Dispo: not yet medical stable for dc.

## 2022-07-08 NOTE — PROGRESS NOTES
[facility]  General Surgery  Progress Note    Subjective:     History of Present Illness:  Herber Camarillo is a 81 y.o. male with PMHx of HLD, hx of prostate cancer who presents to ED with abdominal pain and vomiting. This onset last night. He has vomited numerous times since then. His vomit is greenish. The pain is located in the epigastric area. He has never had similar symptoms previously. His last BM was yesterday. He did have recent L knee arthroplasty on 6/20/22, has not been on home narcotics.   Work up in ED demonstrates transition in pelvis with dilated stomach and small bowel. Labs unremarkable overall.   Prior abdominal surgeries include lap adam and open umbilical hernia repair.             Post-Op Info:  * No surgery found *         Interval History: NAEON. Afebrile. HDS. Reports feeling better this morning. NGT with 650cc out yesterday during day. Clamped overnight for GGC which showed contrast in colon. Denies n/v. Reports BM last night and this AM. Pain is controlled with current regimen. No new symptoms or concerns this morning. Family at bedside. All questions answered.     Medications:  Continuous Infusions:   lactated ringers 100 mL/hr at 07/08/22 0011     Scheduled Meds:   enoxaparin  40 mg Subcutaneous Daily    metoprolol  5 mg Intravenous Q12H     PRN Meds:acetaminophen, HYDROmorphone, HYDROmorphone, ondansetron, promethazine, sodium chloride 0.9%     Review of patient's allergies indicates:   Allergen Reactions    Novocain [procaine] Shortness Of Breath           Objective:     Vital Signs (Most Recent):  Temp: 97.7 °F (36.5 °C) (07/07/22 2327)  Pulse: 75 (07/07/22 2327)  Resp: 16 (07/07/22 2035)  BP: (!) 153/75 (07/07/22 2035)  SpO2: (!) 91 % (07/07/22 2035)   Vital Signs (24h Range):  Temp:  [96.7 °F (35.9 °C)-98.6 °F (37 °C)] 97.7 °F (36.5 °C)  Pulse:  [74-82] 75  Resp:  [16-19] 16  SpO2:  [91 %-97 %] 91 %  BP: (145-153)/(75-80) 153/75     Weight: 86.2 kg (190 lb)  Body mass index  is 29.75 kg/m².    Intake/Output - Last 3 Shifts         07/06 0700  07/07 0659 07/07 0700  07/08 0659 07/08 0700  07/09 0659    P.O. 350      Total Intake(mL/kg) 350 (4.1)      Urine (mL/kg/hr) 0 (0)      Emesis/NG output 0      Drains 1800 650     Stool 0      Total Output 1800 650     Net -1450 -650            Urine Occurrence 2 x      Stool Occurrence 301 x      Emesis Occurrence 4 x              Physical Exam  Vitals and nursing note reviewed.   Constitutional:       General: He is not in acute distress.     Comments: Room air  NGT to LIWS with no output in cannister   HENT:      Head: Normocephalic and atraumatic.      Nose: Nose normal.      Mouth/Throat:      Mouth: Mucous membranes are moist.      Pharynx: Oropharynx is clear.   Eyes:      Extraocular Movements: Extraocular movements intact.      Conjunctiva/sclera: Conjunctivae normal.   Cardiovascular:      Rate and Rhythm: Normal rate.   Pulmonary:      Effort: Pulmonary effort is normal. No respiratory distress.   Abdominal:      Palpations: Abdomen is soft.      Tenderness: There is no guarding or rebound.      Comments: Soft, non-tender. No TTP. Mildly distended. No peritonitic signs   Musculoskeletal:         General: No deformity.   Skin:     General: Skin is warm and dry.   Neurological:      General: No focal deficit present.      Mental Status: He is alert.       Significant Labs:  I have reviewed all pertinent lab results within the past 24 hours.  CBC:   Recent Labs   Lab 07/08/22  0451   WBC 6.69   RBC 4.21*   HGB 13.2*   HCT 39.5*      MCV 94   MCH 31.4*   MCHC 33.4       CMP:   Recent Labs   Lab 07/08/22  0451   GLU 69*   CALCIUM 8.3*   ALBUMIN 2.7*   PROT 5.6*      K 3.2*   CO2 30*   CL 97   BUN 13   CREATININE 0.7   ALKPHOS 75   ALT 28   AST 30   BILITOT 1.1*         Significant Diagnostics:  I have reviewed all pertinent imaging results/findings within the past 24 hours.    Assessment/Plan:     * SBO (small bowel  obstruction)  Patient is an 81 year old male with PMHx of HLD, CAD, OA, prostate cancer presenting with SBO. Clinically stable     - NPO  - NGT to LIWS. NGT clamped overnight, will unclamp this AM and watch output. If output remains low and patient clinically feeling well with continued ROBF, will clamp and proceed with trial of CLD   - Gastrograffin with contrast in the colon and ROBF but then had return of n/v. Repeat GGC with progression and again ROBF.   - Pain meds and antiemetics prn  - Encourage ambulation. Had recent knee surgery; will have PT on board.   - Holding most home neds in setting of acute disease and NPO. Home metprolol converted to IV  - Daily labs  - Replete electrolytes PRN  - DVT prophylaxis (SCDs and lovenox)  - OOB, ambulate    Will discuss with staff.    Dispo: not yet medical stable for dc.           KHUSHBOO JosephC  General Surgery  [facility]

## 2022-07-08 NOTE — SUBJECTIVE & OBJECTIVE
Interval History: NAEON. Afebrile. HDS. Reports feeling better this morning. NGT with 650cc out yesterday during day. Clamped overnight for GGC which showed contrast in colon. Denies n/v. Reports BM last night and this AM. Pain is controlled with current regimen. No new symptoms or concerns this morning. Family at bedside. All questions answered.     Medications:  Continuous Infusions:   lactated ringers 100 mL/hr at 07/08/22 0011     Scheduled Meds:   enoxaparin  40 mg Subcutaneous Daily    metoprolol  5 mg Intravenous Q12H     PRN Meds:acetaminophen, HYDROmorphone, HYDROmorphone, ondansetron, promethazine, sodium chloride 0.9%     Review of patient's allergies indicates:   Allergen Reactions    Novocain [procaine] Shortness Of Breath           Objective:     Vital Signs (Most Recent):  Temp: 97.7 °F (36.5 °C) (07/07/22 2327)  Pulse: 75 (07/07/22 2327)  Resp: 16 (07/07/22 2035)  BP: (!) 153/75 (07/07/22 2035)  SpO2: (!) 91 % (07/07/22 2035)   Vital Signs (24h Range):  Temp:  [96.7 °F (35.9 °C)-98.6 °F (37 °C)] 97.7 °F (36.5 °C)  Pulse:  [74-82] 75  Resp:  [16-19] 16  SpO2:  [91 %-97 %] 91 %  BP: (145-153)/(75-80) 153/75     Weight: 86.2 kg (190 lb)  Body mass index is 29.75 kg/m².    Intake/Output - Last 3 Shifts         07/06 0700  07/07 0659 07/07 0700 07/08 0659 07/08 0700  07/09 0659    P.O. 350      Total Intake(mL/kg) 350 (4.1)      Urine (mL/kg/hr) 0 (0)      Emesis/NG output 0      Drains 1800 650     Stool 0      Total Output 1800 650     Net -1450 -650            Urine Occurrence 2 x      Stool Occurrence 301 x      Emesis Occurrence 4 x              Physical Exam  Vitals and nursing note reviewed.   Constitutional:       General: He is not in acute distress.     Comments: Room air  NGT to LIWS with no output in cannister   HENT:      Head: Normocephalic and atraumatic.      Nose: Nose normal.      Mouth/Throat:      Mouth: Mucous membranes are moist.      Pharynx: Oropharynx is clear.   Eyes:       Extraocular Movements: Extraocular movements intact.      Conjunctiva/sclera: Conjunctivae normal.   Cardiovascular:      Rate and Rhythm: Normal rate.   Pulmonary:      Effort: Pulmonary effort is normal. No respiratory distress.   Abdominal:      Palpations: Abdomen is soft.      Tenderness: There is no guarding or rebound.      Comments: Soft, non-tender. No TTP. Mildly distended. No peritonitic signs   Musculoskeletal:         General: No deformity.   Skin:     General: Skin is warm and dry.   Neurological:      General: No focal deficit present.      Mental Status: He is alert.       Significant Labs:  I have reviewed all pertinent lab results within the past 24 hours.  CBC:   Recent Labs   Lab 07/08/22  0451   WBC 6.69   RBC 4.21*   HGB 13.2*   HCT 39.5*      MCV 94   MCH 31.4*   MCHC 33.4       CMP:   Recent Labs   Lab 07/08/22  0451   GLU 69*   CALCIUM 8.3*   ALBUMIN 2.7*   PROT 5.6*      K 3.2*   CO2 30*   CL 97   BUN 13   CREATININE 0.7   ALKPHOS 75   ALT 28   AST 30   BILITOT 1.1*         Significant Diagnostics:  I have reviewed all pertinent imaging results/findings within the past 24 hours.

## 2022-07-08 NOTE — PT/OT/SLP PROGRESS
"Physical Therapy   Progress Note    Patient Name:  Herber Camarillo  MRN: [id number]    Admit Date: 7/3/2022  Admitting Diagnosis:  SBO (small bowel obstruction)  Length of Stay: 5 days  Recent Surgery: * No surgery found *      Recommendations:     Discharge Recommendations: Outpatient PT   Equipment recommendations: none  Barriers to discharge: None Identified     Assessment:     Herber Camarillo is a 81 y.o. male admitted to [facility] on 7/3/2022 with medical diagnosis of SBO (small bowel obstruction). Pt presents with weakness, impaired endurance, impaired self care skills, impaired functional mobilty, gait instability, impaired balance, orthopedic precautions. Pt is progressing towards goals, but has not yet reached prior level of function. Pt woken up by therapist's entrance into room. He states he did not sleep well last night. Herber Camarillo would benefit from continued acute PT intervention to improve quality of life, focus on recovery of impairments, provide patient/caregiver education, reduce fall risk, and maximize (I) and safety with functional mobility. Once medically stable, recommending pt discharge to Outpatient PT .      Rehab Prognosis: Good    Plan:     During this hospitalization, patient to be seen 3 x/week to address the identified rehab impairments via gait training, therapeutic activities, therapeutic exercises, neuromuscular re-education and progress towards stated goals.     Plan of Care Expires:  08/04/22  Plan of Care reviewed with: patient and spouse    This plan of care has been discussed with the patient/caregiver, who was included in its development and is in agreement with the identified goals and treatment plan.     Subjective     Communicated with RN prior to session.  Patient found supine upon PT entry to room, agreeable to therapy session. Pt's wife present during session.    Patient/Family Comments/goals: "I had a long night"    Pain/Comfort:  · Pain Rating 1:  (pt did not rate)  · Pain Rating " Post-Intervention 1:  (pt did not rate)    Patients cultural, spiritual, Yazdanism conflicts given the current situation: None identified     Objective:     Patient found with: NG tube, peripheral IV    General Precautions: Standard, fall   Orthopedic Precautions:LLE weight bearing as tolerated   Braces: N/A   Oxygen Device: room air    Cognition:   Pt is Alert during session.    Therapist provided skilled verbal and tactile cueing to facilitate the following functional mobility tasks. Listed tasks are focused on recovery of impairments and improving pt's independence and efficiency with bed mobility, transfers and ambulation as able.       Outcome Measure: AM-PAC 6 CLICK MOBILITY  Total Score:18     Patient/Caregiver Education and Additional Therapeutic Activities/Exercises   1. Provided pt exercise handout of the following exercises and had him perform 1-3 of each for understanding: ankle pumps, heel slides, glut sets, quad sets, LAQ, SAQ  2. Encourage pt to continue ambulating and t/f to recliner over weekend    Provided pt/caregiver education regarding:    PT POC and goals for therapy    Safety with mobility and fall risk    Safe management of AD as needed    Energy conservation techniques    Instruction on use of call button and importance of calling nursing staff for assistance with mobility     Patient/caregiver able to verbalize understanding; will follow-up with pt/caregiver during current admit for additional questions/concerns within scope of practice.     White board updated.     Patient left supine with all lines intact, call button in reach and wife and nsg present.    Goals:     Multidisciplinary Problems     Physical Therapy Goals        Problem: Physical Therapy    Goal Priority Disciplines Outcome Goal Variances Interventions   Physical Therapy Goal     PT, PT/OT Ongoing, Progressing     Description: Goals to be met by: 7/19/22     Patient will increase functional independence with mobility  by performin. Supine to sit with Modified [place]  2. Sit to stand transfer with Supervision  3. Bed to chair transfer with Supervision using LRAD  4. Gait  x 150 feet with Modified [place] using LRAD  5. Ascend/Descend 6 inch curb step with Stand-by Assistance using Rolling Walker.  6. Lower extremity exercise program x30 reps per handout, with independence                     Time Tracking:       PT Received On: 22  PT Start Time: 931     PT Stop Time: 941  PT Total Time (min): 10 min     Billable Minutes: Therapeutic Exercise 10    2022

## 2022-07-09 VITALS
HEART RATE: 75 BPM | BODY MASS INDEX: 29.82 KG/M2 | SYSTOLIC BLOOD PRESSURE: 159 MMHG | OXYGEN SATURATION: 95 % | RESPIRATION RATE: 20 BRPM | WEIGHT: 190 LBS | DIASTOLIC BLOOD PRESSURE: 74 MMHG | TEMPERATURE: 98 F | HEIGHT: 67 IN

## 2022-07-09 LAB
ALBUMIN SERPL BCP-MCNC: 2.5 G/DL (ref 3.5–5.2)
ALP SERPL-CCNC: 77 U/L (ref 55–135)
ALT SERPL W/O P-5'-P-CCNC: 23 U/L (ref 10–44)
ANION GAP SERPL CALC-SCNC: 11 MMOL/L (ref 8–16)
AST SERPL-CCNC: 25 U/L (ref 10–40)
BASOPHILS # BLD AUTO: 0.05 K/UL (ref 0–0.2)
BASOPHILS NFR BLD: 0.8 % (ref 0–1.9)
BILIRUB SERPL-MCNC: 1 MG/DL (ref 0.1–1)
BUN SERPL-MCNC: 8 MG/DL (ref 8–23)
CALCIUM SERPL-MCNC: 8.2 MG/DL (ref 8.7–10.5)
CHLORIDE SERPL-SCNC: 99 MMOL/L (ref 95–110)
CO2 SERPL-SCNC: 28 MMOL/L (ref 23–29)
CREAT SERPL-MCNC: 0.7 MG/DL (ref 0.5–1.4)
DIFFERENTIAL METHOD: ABNORMAL
EOSINOPHIL # BLD AUTO: 0.4 K/UL (ref 0–0.5)
EOSINOPHIL NFR BLD: 6.4 % (ref 0–8)
ERYTHROCYTE [DISTWIDTH] IN BLOOD BY AUTOMATED COUNT: 11.9 % (ref 11.5–14.5)
EST. GFR  (AFRICAN AMERICAN): >60 ML/MIN/1.73 M^2
EST. GFR  (NON AFRICAN AMERICAN): >60 ML/MIN/1.73 M^2
GLUCOSE SERPL-MCNC: 86 MG/DL (ref 70–110)
HCT VFR BLD AUTO: 40.5 % (ref 40–54)
HGB BLD-MCNC: 13.8 G/DL (ref 14–18)
IMM GRANULOCYTES # BLD AUTO: 0.04 K/UL (ref 0–0.04)
IMM GRANULOCYTES NFR BLD AUTO: 0.7 % (ref 0–0.5)
LYMPHOCYTES # BLD AUTO: 1.1 K/UL (ref 1–4.8)
LYMPHOCYTES NFR BLD: 18.7 % (ref 18–48)
MAGNESIUM SERPL-MCNC: 1.9 MG/DL (ref 1.6–2.6)
MCH RBC QN AUTO: 31.2 PG (ref 27–31)
MCHC RBC AUTO-ENTMCNC: 34.1 G/DL (ref 32–36)
MCV RBC AUTO: 91 FL (ref 82–98)
MONOCYTES # BLD AUTO: 0.9 K/UL (ref 0.3–1)
MONOCYTES NFR BLD: 14.1 % (ref 4–15)
NEUTROPHILS # BLD AUTO: 3.6 K/UL (ref 1.8–7.7)
NEUTROPHILS NFR BLD: 59.3 % (ref 38–73)
NRBC BLD-RTO: 0 /100 WBC
PHOSPHATE SERPL-MCNC: 2.2 MG/DL (ref 2.7–4.5)
PLATELET # BLD AUTO: 277 K/UL (ref 150–450)
PMV BLD AUTO: 10.3 FL (ref 9.2–12.9)
POTASSIUM SERPL-SCNC: 3.2 MMOL/L (ref 3.5–5.1)
PROT SERPL-MCNC: 5.4 G/DL (ref 6–8.4)
RBC # BLD AUTO: 4.43 M/UL (ref 4.6–6.2)
SODIUM SERPL-SCNC: 138 MMOL/L (ref 136–145)
WBC # BLD AUTO: 6.1 K/UL (ref 3.9–12.7)

## 2022-07-09 PROCEDURE — 80053 COMPREHEN METABOLIC PANEL: CPT | Performed by: STUDENT IN AN ORGANIZED HEALTH CARE EDUCATION/TRAINING PROGRAM

## 2022-07-09 PROCEDURE — 84100 ASSAY OF PHOSPHORUS: CPT | Performed by: STUDENT IN AN ORGANIZED HEALTH CARE EDUCATION/TRAINING PROGRAM

## 2022-07-09 PROCEDURE — 36415 COLL VENOUS BLD VENIPUNCTURE: CPT | Performed by: STUDENT IN AN ORGANIZED HEALTH CARE EDUCATION/TRAINING PROGRAM

## 2022-07-09 PROCEDURE — 83735 ASSAY OF MAGNESIUM: CPT | Performed by: STUDENT IN AN ORGANIZED HEALTH CARE EDUCATION/TRAINING PROGRAM

## 2022-07-09 PROCEDURE — 85025 COMPLETE CBC W/AUTO DIFF WBC: CPT | Performed by: STUDENT IN AN ORGANIZED HEALTH CARE EDUCATION/TRAINING PROGRAM

## 2022-07-09 NOTE — NURSING
CALLED PROVIDER ON CALL FOR .  DR CARUSO CALLED BACK.  PROVIDER WILL PLACE ORDERS FOR EARLY MORNING DISCHARGE.  ADARSH BAUM

## 2022-07-09 NOTE — DISCHARGE SUMMARY
[facility]  General Surgery  Discharge Summary      Patient Name: Herber Camarillo  MRN: [id number]  Admission Date: 7/3/2022  Hospital Length of Stay: 6 days  Discharge Date and Time:  07/09/2022 5:49 AM  Attending Physician: Patric Lainez MD   Discharging Provider: Stuart Ordonez MD  Primary Care Provider: Fidelina Loyola MD     HPI: 81YOM with history of intraabdominal surgery presenting with SBO. Conservative management with NGT and gastrografin challenge twice resulted in spontaneous resolution of symptoms. Patient tolerating regular diet and having normal bowel movements prior to discharge. Explained expected course of disease and possibility of future recurrence.    Please see hospital and op notes for further detail regarding patient's admission.    Consults (From admission, onward)        Status Ordering Provider     Inpatient consult to General Surgery  Once        Provider:  (Not yet assigned)    YANN Allred        Final Active Diagnoses:    Diagnosis Date Noted POA    PRINCIPAL PROBLEM:  SBO (small bowel obstruction) [K56.609] 07/03/2022 Yes    Hypokalemia [E87.6] 07/08/2022 No      Problems Resolved During this Admission:      Discharged Condition: good    Disposition: Home or Self Care    Follow Up:   Follow-up Information     Fidelina Loyola MD Follow up in 2 week(s).    Specialty: Internal Medicine  Why: Hospital follow up  Contact information:  [facility] [address]  [phone]                         Patient Instructions:      Diet Cardiac     Notify your health care provider if you experience any of the following:  increased confusion or weakness     Notify your health care provider if you experience any of the following:  persistent dizziness, light-headedness, or visual disturbances     Notify your health care provider if you experience any of the following:  worsening rash     Notify your health care provider if you experience any of the  following:  severe persistent headache     Notify your health care provider if you experience any of the following:  difficulty breathing or increased cough     Notify your health care provider if you experience any of the following:  redness, tenderness, or signs of infection (pain, swelling, redness, odor or green/yellow discharge around incision site)     Notify your health care provider if you experience any of the following:  severe uncontrolled pain     Notify your health care provider if you experience any of the following:  persistent nausea and vomiting or diarrhea     Notify your health care provider if you experience any of the following:  temperature >100.4     Activity as tolerated     Medications:  Reconciled Home Medications:      Medication List      CONTINUE taking these medications    ARTHRITIS PAIN RELIEF (ACETAM) 650 MG Tbsr  Generic drug: acetaminophen  Take 1 tablet (650 mg total) by mouth every 8 (eight) hours.     aspirin 81 MG EC tablet  Commonly known as: ECOTRIN  Take 1 tablet (81 mg total) by mouth 2 (two) times a day.     atorvastatin 20 MG tablet  Commonly known as: LIPITOR  Take 1 tablet (20 mg total) by mouth once daily.     b complex vitamins tablet  Take 1 tablet by mouth once daily.     docusate sodium 100 MG capsule  Commonly known as: COLACE  Take 1 capsule (100 mg total) by mouth 2 (two) times daily.     meloxicam 15 MG tablet  Commonly known as: MOBIC  Take 1 tablet (15 mg total) by mouth once daily.     metoprolol succinate 25 MG 24 hr tablet  Commonly known as: TOPROL-XL  Take 1 tablet (25 mg total) by mouth once daily.     omega-3 fatty acids-vitamin E 1,000 mg Cap  Take by mouth.     oxyCODONE 5 MG immediate release tablet  Commonly known as: ROXICODONE  Take 1-2 tabs every 4-6 hours as needed for pain     pregabalin 75 MG capsule  Commonly known as: LYRICA  Take 1 capsule (75 mg total) by mouth 2 (two) times daily.          Stuart Ordonez MD    Patient was seen and  examined on the date of discharge and determined to be suitable for discharge.  Total time spent preparing discharge services: 30 minutes.  Time was spent speaking with consultants and case management, reviewing records, and/or discussing the plan of care with patient/family.

## 2022-07-09 NOTE — NURSING
PT IS ALERT AND ORIENTED TIMES THREE. NO C/O PAIN OR RESPIRATORY DISTRESS NOTED AT THIS TIME. SALINE LOCK REMOVED, BANDAGE PLACDE OVER SITE. DRESSING INTACT TO LEFT KNEE.  HEART MONITOR REMOVED.  DISCHARGE INSTRUCTIONS GIVEN TO PT AND WIFE. VERBALIZED UNDERSTANDING OF INSTRUCTIONS.  AWAITING SON TO TRANSPORT HOME. ADARSH BAUM

## 2022-07-09 NOTE — PLAN OF CARE
Problem: Adult Inpatient Plan of Care  Goal: Plan of Care Review  7/9/2022 0616 by Joe Storm RN  Outcome: Met  7/8/2022 2305 by Joe Storm RN  Outcome: Ongoing, Progressing  Goal: Patient-Specific Goal (Individualized)  7/9/2022 0616 by Joe Storm RN  Outcome: Met  7/8/2022 2305 by Joe Storm RN  Outcome: Ongoing, Progressing  Goal: Absence of Hospital-Acquired Illness or Injury  7/9/2022 0616 by Joe Storm RN  Outcome: Met  7/8/2022 2305 by Joe Storm RN  Outcome: Ongoing, Progressing  Goal: Optimal Comfort and Wellbeing  7/9/2022 0616 by Joe Storm RN  Outcome: Met  7/8/2022 2305 by Joe Storm RN  Outcome: Ongoing, Progressing  Goal: Readiness for Transition of Care  7/9/2022 0616 by Joe Storm RN  Outcome: Met  7/8/2022 2305 by Joe Storm RN  Outcome: Ongoing, Progressing     Problem: Skin Injury Risk Increased  Goal: Skin Health and Integrity  7/9/2022 0616 by Joe Storm RN  Outcome: Met  7/8/2022 2305 by Joe Storm RN  Outcome: Ongoing, Progressing

## 2022-07-10 ENCOUNTER — TELEPHONE (OUTPATIENT)
Dept: INTERNAL MEDICINE | Facility: CLINIC | Age: 82
End: 2022-07-10
Payer: MEDICARE

## 2022-07-10 NOTE — TELEPHONE ENCOUNTER
He was in the hospital with a bowel obstruction    Needs hospital follow up with me or Vicki in 2 weeks thanks

## 2022-07-11 NOTE — PHYSICIAN QUERY
PT Name: Herber Camarillo  MR #: [id number]     DOCUMENTATION CLARIFICATION     CDS: Talia MCCLOUD RN  Contact information: [email or web address]    This form is a permanent document in the medical record.     Query Date: July 11, 2022    By submitting this query, we are merely seeking further clarification of documentation to reflect the severity of illness of your patient. Please utilize your independent clinical judgment when addressing the question(s) below.    The medical record reflects the following:     Indicators   Supporting Clinical Findings Location in Medical Record   X Bowel obstruction, intestinal obstruction, LBO or SBO documented * SBO (small bowel obstruction)  Patient is an 81 year old male with PMHx of HLD, CAD, OA, prostate cancer presenting with SBO. Clinically stable     HPI: 81YOM with history of intraabdominal surgery presenting with SBO. Conservative management with NGT and gastrografin challenge twice resulted in spontaneous resolution of symptoms. Patient tolerating regular diet and having normal bowel movements prior to discharge. Explained expected course of disease and possibility of future recurrence. PN Gen Surg 7/7/2022        DCS Gen Surg 7/9/2022   X Radiology findings Dilated fluid-filled small bowel loops measure up to 4 cm with suggested transition point to relatively decompressed more distal small bowel loops in the right mid abdomen, as described.  Findings concerning for acute small bowel obstruction, with ileus additionally considered.    Presume small bowel ileus.  Contrast within the colon and rectum although portion of contrast was identified in similar location pre Gastrografin challenge.  Nevertheless, contrast within the colon does mitigate against complete bowel obstruction. CT Abd 7/3/2022            XR Abd 7/8/2022   X Treatment/Medication - NPO  - NGT to LIWS  - Gastrograffin with contrast in the colon and ROBF but then had return of n/v. Will continue NGT  decompression for now and discuss potential operative intervention vs continued conservative treatment with staff  - Pain meds and antiemetics prn PN Gen Surg 7/7/2022    Procedure/Surgery      Other       Provider, please further specify the bowel obstruction diagnosis:    [   ] Partial or incomplete intestinal obstruction, due to adhesions   [   ] Partial or incomplete intestinal obstruction, due to other (please specify): ____________   [ X  ] Partial or incomplete intestinal obstruction, unknown or unspecified etiology   [   ] Complete intestinal obstruction, due to adhesions   [   ] Complete intestinal obstruction, due to other (please specify): ____________   [   ] Complete intestinal obstruction, unknown or unspecified etiology   [   ] Other intestinal condition (please specify): _____________________   [   ]  Clinically Undetermined         Please document in your progress notes daily for the duration of treatment until resolved, and include in your discharge summary.

## 2022-07-11 NOTE — PLAN OF CARE
[facility]  Discharge Final Note    Primary Care Provider: Fidelina Loyola MD    Expected Discharge Date: 7/9/2022    Final Discharge Note (most recent)     Final Note - 07/11/22 1526        Final Note    Assessment Type Final Discharge Note     Anticipated Discharge Disposition Home or Self Care     What phone number can be called within the next 1-3 days to see how you are doing after discharge? --   [phone]    Hospital Resources/Appts/Education Provided Appointments scheduled and added to AVS                 Important Message from Medicare  Important Message from Medicare regarding Discharge Appeal Rights: Given to patient/caregiver, Explained to patient/caregiver, Signed/date by patient/caregiver     Date IMM was signed: 07/06/22  Time IMM was signed: 0931    Contact Info     Fidelina Loyola MD   Specialty: Internal Medicine   Relationship: PCP - General    [facility]  [facility] [address]   Phone: [phone]       Next Steps: Follow up in 2 week(s)    Instructions: Hospital follow up            Future Appointments   Date Time Provider Department Center   7/13/2022  1:30 PM Spring Painting NP [facility] [address]   7/21/2022  9:30 AM Vicki [facility] NP [facility]   8/2/2022 11:00 AM [facility] XR1 [facility] [place]   8/4/2022  1:15 PM Rodolfo Nguyen MD [facility] [address]     Patient discharged home to care of family on 7/9/22.

## 2022-07-11 NOTE — TELEPHONE ENCOUNTER
Spoke to patient eula advised of need for hospital fu due to recent hospital visit for bowel obstruction.   Patient verbalized understanding.    Offered assistance with scheduling.  Patient declined and stated he will do it on line.

## 2022-07-13 ENCOUNTER — OFFICE VISIT (OUTPATIENT)
Dept: ORTHOPEDICS | Facility: CLINIC | Age: 82
End: 2022-07-13
Payer: MEDICARE

## 2022-07-13 VITALS — BODY MASS INDEX: 30.45 KG/M2 | HEIGHT: 67 IN | WEIGHT: 194 LBS

## 2022-07-13 DIAGNOSIS — Z96.652 STATUS POST LEFT KNEE REPLACEMENT: Primary | ICD-10-CM

## 2022-07-13 PROCEDURE — 1101F PT FALLS ASSESS-DOCD LE1/YR: CPT | Mod: CPTII,S$GLB,, | Performed by: NURSE PRACTITIONER

## 2022-07-13 PROCEDURE — 99999 PR PBB SHADOW E&M-EST. PATIENT-LVL III: CPT | Mod: PBBFAC,,, | Performed by: NURSE PRACTITIONER

## 2022-07-13 PROCEDURE — 3288F FALL RISK ASSESSMENT DOCD: CPT | Mod: CPTII,S$GLB,, | Performed by: NURSE PRACTITIONER

## 2022-07-13 PROCEDURE — 1160F PR REVIEW ALL MEDS BY PRESCRIBER/CLIN PHARMACIST DOCUMENTED: ICD-10-PCS | Mod: CPTII,S$GLB,, | Performed by: NURSE PRACTITIONER

## 2022-07-13 PROCEDURE — 1159F MED LIST DOCD IN RCRD: CPT | Mod: CPTII,S$GLB,, | Performed by: NURSE PRACTITIONER

## 2022-07-13 PROCEDURE — 99024 PR POST-OP FOLLOW-UP VISIT: ICD-10-PCS | Mod: S$GLB,,, | Performed by: NURSE PRACTITIONER

## 2022-07-13 PROCEDURE — 99024 POSTOP FOLLOW-UP VISIT: CPT | Mod: S$GLB,,, | Performed by: NURSE PRACTITIONER

## 2022-07-13 PROCEDURE — 1159F PR MEDICATION LIST DOCUMENTED IN MEDICAL RECORD: ICD-10-PCS | Mod: CPTII,S$GLB,, | Performed by: NURSE PRACTITIONER

## 2022-07-13 PROCEDURE — 1160F RVW MEDS BY RX/DR IN RCRD: CPT | Mod: CPTII,S$GLB,, | Performed by: NURSE PRACTITIONER

## 2022-07-13 PROCEDURE — 3288F PR FALLS RISK ASSESSMENT DOCUMENTED: ICD-10-PCS | Mod: CPTII,S$GLB,, | Performed by: NURSE PRACTITIONER

## 2022-07-13 PROCEDURE — 1125F AMNT PAIN NOTED PAIN PRSNT: CPT | Mod: CPTII,S$GLB,, | Performed by: NURSE PRACTITIONER

## 2022-07-13 PROCEDURE — 1125F PR PAIN SEVERITY QUANTIFIED, PAIN PRESENT: ICD-10-PCS | Mod: CPTII,S$GLB,, | Performed by: NURSE PRACTITIONER

## 2022-07-13 PROCEDURE — 1101F PR PT FALLS ASSESS DOC 0-1 FALLS W/OUT INJ PAST YR: ICD-10-PCS | Mod: CPTII,S$GLB,, | Performed by: NURSE PRACTITIONER

## 2022-07-13 PROCEDURE — 99999 PR PBB SHADOW E&M-EST. PATIENT-LVL III: ICD-10-PCS | Mod: PBBFAC,,, | Performed by: NURSE PRACTITIONER

## 2022-07-18 NOTE — PROGRESS NOTES
"Herber Camarillo presents for initial post-operative visit following a left total knee arthroplasty performed by Dr. Nguyen on 6/20/2022. This is normally the 2 week post op, but the patient was in the hospital for bowel obstruction. I did go see him in the hospital, but we left the dressing in place to prevent infection, so he returns to removal of the dressing today.    Exam:   Height 5' 7" (1.702 m), weight 88 kg (194 lb).   Ambulating well with assistive device.  Incision is clean and dry without drainage or erythema.   ROM:0-95    Initial post-operative radiographs reviewed today revealing a well fixed and aligned prosthesis.    A/P:  3 weeks s/p left total knee replacement  - The patient was advised to keep the incision clean and dry for the next 24 hours after which he may wash the area with antibacterial soap in the shower. Will not submerge incision until one month post op.  - Outpatient PT: ongoing at the [place] location  - Continue aspirin for 1 month post op.  - Pain medication refill not needed  - Follow up in 4 weeks with surgeon. Pt will call clinic with problems/concerns.     "

## 2022-07-19 ENCOUNTER — TELEPHONE (OUTPATIENT)
Dept: ENDOSCOPY | Facility: HOSPITAL | Age: 82
End: 2022-07-19
Payer: MEDICARE

## 2022-07-19 NOTE — TELEPHONE ENCOUNTER
----- Message from Sumeet Crawford sent at 7/19/2022  4:16 PM CDT -----  Contact: Patient daughter  Patient daughter requesting call back in regards to scheduling a NP patient appointment.       Pt Daughter@ [phone] ([facility])

## 2022-07-21 ENCOUNTER — HOSPITAL ENCOUNTER (OUTPATIENT)
Dept: RADIOLOGY | Facility: HOSPITAL | Age: 82
Discharge: HOME | End: 2022-07-21
Attending: NURSE PRACTITIONER
Payer: MEDICARE

## 2022-07-21 ENCOUNTER — TELEPHONE (OUTPATIENT)
Dept: INTERNAL MEDICINE | Facility: CLINIC | Age: 82
End: 2022-07-21
Payer: MEDICARE

## 2022-07-21 ENCOUNTER — TELEPHONE (OUTPATIENT)
Dept: INTERNAL MEDICINE | Facility: CLINIC | Age: 82
End: 2022-07-21

## 2022-07-21 ENCOUNTER — OFFICE VISIT (OUTPATIENT)
Dept: INTERNAL MEDICINE | Facility: CLINIC | Age: 82
End: 2022-07-21
Payer: MEDICARE

## 2022-07-21 VITALS
DIASTOLIC BLOOD PRESSURE: 66 MMHG | SYSTOLIC BLOOD PRESSURE: 110 MMHG | WEIGHT: 186.31 LBS | HEIGHT: 67 IN | OXYGEN SATURATION: 97 % | HEART RATE: 70 BPM | BODY MASS INDEX: 29.24 KG/M2

## 2022-07-21 DIAGNOSIS — Z96.652 S/P TOTAL KNEE ARTHROPLASTY, LEFT: ICD-10-CM

## 2022-07-21 DIAGNOSIS — M17.12 PRIMARY OSTEOARTHRITIS OF LEFT KNEE: ICD-10-CM

## 2022-07-21 DIAGNOSIS — E87.5 HYPERKALEMIA: Primary | ICD-10-CM

## 2022-07-21 DIAGNOSIS — E87.6 HYPOKALEMIA: ICD-10-CM

## 2022-07-21 DIAGNOSIS — K56.609 SBO (SMALL BOWEL OBSTRUCTION): Primary | ICD-10-CM

## 2022-07-21 DIAGNOSIS — K56.609 SBO (SMALL BOWEL OBSTRUCTION): ICD-10-CM

## 2022-07-21 PROCEDURE — 3288F FALL RISK ASSESSMENT DOCD: CPT | Mod: CPTII,S$GLB,, | Performed by: NURSE PRACTITIONER

## 2022-07-21 PROCEDURE — 1159F PR MEDICATION LIST DOCUMENTED IN MEDICAL RECORD: ICD-10-PCS | Mod: CPTII,S$GLB,, | Performed by: NURSE PRACTITIONER

## 2022-07-21 PROCEDURE — 3074F SYST BP LT 130 MM HG: CPT | Mod: CPTII,S$GLB,, | Performed by: NURSE PRACTITIONER

## 2022-07-21 PROCEDURE — 1159F MED LIST DOCD IN RCRD: CPT | Mod: CPTII,S$GLB,, | Performed by: NURSE PRACTITIONER

## 2022-07-21 PROCEDURE — 1111F DSCHRG MED/CURRENT MED MERGE: CPT | Mod: CPTII,S$GLB,, | Performed by: NURSE PRACTITIONER

## 2022-07-21 PROCEDURE — 99999 PR PBB SHADOW E&M-EST. PATIENT-LVL III: CPT | Mod: PBBFAC,,, | Performed by: NURSE PRACTITIONER

## 2022-07-21 PROCEDURE — 3074F PR MOST RECENT SYSTOLIC BLOOD PRESSURE < 130 MM HG: ICD-10-PCS | Mod: CPTII,S$GLB,, | Performed by: NURSE PRACTITIONER

## 2022-07-21 PROCEDURE — 99214 PR OFFICE/OUTPT VISIT, EST, LEVL IV, 30-39 MIN: ICD-10-PCS | Mod: S$GLB,,, | Performed by: NURSE PRACTITIONER

## 2022-07-21 PROCEDURE — 74018 RADEX ABDOMEN 1 VIEW: CPT | Mod: 26,,, | Performed by: RADIOLOGY

## 2022-07-21 PROCEDURE — 99999 PR PBB SHADOW E&M-EST. PATIENT-LVL III: ICD-10-PCS | Mod: PBBFAC,,, | Performed by: NURSE PRACTITIONER

## 2022-07-21 PROCEDURE — 74018 RADEX ABDOMEN 1 VIEW: CPT | Mod: TC

## 2022-07-21 PROCEDURE — 3288F PR FALLS RISK ASSESSMENT DOCUMENTED: ICD-10-PCS | Mod: CPTII,S$GLB,, | Performed by: NURSE PRACTITIONER

## 2022-07-21 PROCEDURE — 1111F PR DISCHARGE MEDS RECONCILED W/ CURRENT OUTPATIENT MED LIST: ICD-10-PCS | Mod: CPTII,S$GLB,, | Performed by: NURSE PRACTITIONER

## 2022-07-21 PROCEDURE — 99214 OFFICE O/P EST MOD 30 MIN: CPT | Mod: S$GLB,,, | Performed by: NURSE PRACTITIONER

## 2022-07-21 PROCEDURE — 3078F PR MOST RECENT DIASTOLIC BLOOD PRESSURE < 80 MM HG: ICD-10-PCS | Mod: CPTII,S$GLB,, | Performed by: NURSE PRACTITIONER

## 2022-07-21 PROCEDURE — 74018 XR ABDOMEN AP 1 VIEW: ICD-10-PCS | Mod: 26,,, | Performed by: RADIOLOGY

## 2022-07-21 PROCEDURE — 3078F DIAST BP <80 MM HG: CPT | Mod: CPTII,S$GLB,, | Performed by: NURSE PRACTITIONER

## 2022-07-21 PROCEDURE — 1101F PT FALLS ASSESS-DOCD LE1/YR: CPT | Mod: CPTII,S$GLB,, | Performed by: NURSE PRACTITIONER

## 2022-07-21 PROCEDURE — 1101F PR PT FALLS ASSESS DOC 0-1 FALLS W/OUT INJ PAST YR: ICD-10-PCS | Mod: CPTII,S$GLB,, | Performed by: NURSE PRACTITIONER

## 2022-07-21 NOTE — TELEPHONE ENCOUNTER
Spoke with pt - will repeat K+. Pt is not on Ace ir Arb, kidney function is normal. Pt without complaints. Will repeat today stat

## 2022-07-21 NOTE — PROGRESS NOTES
INTERNAL MEDICINE PROGRESS NOTE    CHIEF COMPLAINT     Chief Complaint   Patient presents with    Follow-up       HPI     Herber Camarillo is a 81 y.o. male with arthritis, HLD, HTN, CAD, and prostate cancer who presents for a follow up visit today.    Here for hospital follow up was admitted [facility] 7/3/2022 with SBO. S/p knee replacement 6/22/2022  Conservative management with NGT and gastrografin challenge twice resulted in spontaneous resolution of symptoms. Patient tolerating regular diet and having normal bowel movements prior to discharge.    H/o umbilical hernia repair, gallbladder surgery in past     Pt here for hospital follow - continues to nausea and occasional diarrhea. Abd pain to the mid abd starting last night.   +flatulence   2 liquid BMs per day.  Stopped oxycodone     Hypokalemia     Weight loss- 10 lbs since TKA     Left TKA- taking tylenol only. Doing well, hasnt been able to go to PT 2/2 SBO.             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 Authorizing Provider   acetaminophen (TYLENOL) 650 MG TbSR Take 1 tablet (650 mg total) by mouth every 8 (eight) hours. 6/17/22   Spring Painting NP   aspirin (ECOTRIN) 81 MG EC tablet Take 1 tablet (81 mg total) by mouth 2 (two) times a day. 6/17/22 6/17/23  Spring Painting NP   atorvastatin (LIPITOR) 20 MG tablet Take 1 tablet (20 mg total) by mouth once  "daily. 5/16/22   Lauri Nino MD   b complex vitamins tablet Take 1 tablet by mouth once daily.    Historical Provider   docusate sodium (COLACE) 100 MG capsule Take 1 capsule (100 mg total) by mouth 2 (two) times daily. 6/17/22   Spring Painting NP   meloxicam (MOBIC) 15 MG tablet Take 1 tablet (15 mg total) by mouth once daily. 6/17/22   Spring Painting NP   metoprolol succinate (TOPROL-XL) 25 MG 24 hr tablet Take 1 tablet (25 mg total) by mouth once daily. 5/16/22   Lauri Nino MD   omega-3 fatty acids-vitamin E 1,000 mg Cap Take by mouth. 4/23/12   Historical Provider   oxyCODONE (ROXICODONE) 5 MG immediate release tablet Take 1-2 tabs every 4-6 hours as needed for pain 6/17/22   Spring Painting NP   pregabalin (LYRICA) 75 MG capsule Take 1 capsule (75 mg total) by mouth 2 (two) times daily. 6/17/22 12/16/22  Spring Painting NP       Review of Systems:  Review of Systems   Constitutional: Positive for appetite change and unexpected weight change. Negative for chills, fatigue and fever.   Respiratory: Negative for cough, shortness of breath and wheezing.    Cardiovascular: Negative for chest pain and palpitations.   Gastrointestinal: Positive for abdominal pain and diarrhea. Negative for nausea and vomiting.   Musculoskeletal: Positive for arthralgias.       Health Maintainence:   Immunizations:  Health Maintenance       Date Due Completion Date    PROSTATE-SPECIFIC ANTIGEN 03/31/2022 3/31/2021    COVID-19 Vaccine (4 - Booster for Moderna series) 05/11/2022 1/11/2022    Influenza Vaccine (1) 09/01/2022 10/20/2021    Lipid Panel 10/20/2022 10/20/2021    TETANUS VACCINE 10/17/2029 10/17/2019           PHYSICAL EXAM     /66 (BP Location: Left arm, Patient Position: Sitting, BP Method: Medium (Manual))   Pulse 70   Ht 5' 7" (1.702 m)   Wt 84.5 kg (186 lb 4.6 oz)   SpO2 97%   BMI 29.18 kg/m²     Physical Exam  Vitals reviewed.   Constitutional:       General: He is not in acute " distress.     Appearance: Normal appearance. He is well-developed. He is not ill-appearing, toxic-appearing or diaphoretic.   HENT:      Head: Normocephalic and atraumatic.   Eyes:      Pupils: Pupils are equal, round, and reactive to light.   Cardiovascular:      Rate and Rhythm: Normal rate and regular rhythm.   Pulmonary:      Effort: Pulmonary effort is normal.   Abdominal:      General: Abdomen is flat. Bowel sounds are normal. There is no distension.      Palpations: Abdomen is soft. There is no mass.      Tenderness: There is no abdominal tenderness. There is no right CVA tenderness, left CVA tenderness, guarding or rebound.      Hernia: No hernia is present.   Musculoskeletal:      Left knee: Swelling present. No erythema or bony tenderness. Normal range of motion. No tenderness.        Legs:    Neurological:      Mental Status: He is alert and oriented to person, place, and time.         LABS     Lab Results   Component Value Date    HGBA1C 4.9 03/31/2021     CMP  Sodium   Date Value Ref Range Status   07/09/2022 138 136 - 145 mmol/L Final     Potassium   Date Value Ref Range Status   07/09/2022 3.2 (L) 3.5 - 5.1 mmol/L Final     Chloride   Date Value Ref Range Status   07/09/2022 99 95 - 110 mmol/L Final     CO2   Date Value Ref Range Status   07/09/2022 28 23 - 29 mmol/L Final     Glucose   Date Value Ref Range Status   07/09/2022 86 70 - 110 mg/dL Final     BUN   Date Value Ref Range Status   07/09/2022 8 8 - 23 mg/dL Final     Creatinine   Date Value Ref Range Status   07/09/2022 0.7 0.5 - 1.4 mg/dL Final     Calcium   Date Value Ref Range Status   07/09/2022 8.2 (L) 8.7 - 10.5 mg/dL Final     Total Protein   Date Value Ref Range Status   07/09/2022 5.4 (L) 6.0 - 8.4 g/dL Final     Albumin   Date Value Ref Range Status   07/09/2022 2.5 (L) 3.5 - 5.2 g/dL Final     Total Bilirubin   Date Value Ref Range Status   07/09/2022 1.0 0.1 - 1.0 mg/dL Final     Comment:     For infants and newborns,  interpretation of results should be based  on gestational age, weight and in agreement with clinical  observations.    Premature Infant recommended reference ranges:  Up to 24 hours.............<8.0 mg/dL  Up to 48 hours............<12.0 mg/dL  3-5 days..................<15.0 mg/dL  6-29 days.................<15.0 mg/dL       Alkaline Phosphatase   Date Value Ref Range Status   07/09/2022 77 55 - 135 U/L Final     AST   Date Value Ref Range Status   07/09/2022 25 10 - 40 U/L Final     ALT   Date Value Ref Range Status   07/09/2022 23 10 - 44 U/L Final     Anion Gap   Date Value Ref Range Status   07/09/2022 11 8 - 16 mmol/L Final     eGFR if    Date Value Ref Range Status   07/09/2022 >60.0 >60 mL/min/1.73 m^2 Final     eGFR if non    Date Value Ref Range Status   07/09/2022 >60.0 >60 mL/min/1.73 m^2 Final     Comment:     Calculation used to obtain the estimated glomerular filtration  rate (eGFR) is the CKD-EPI equation.        Lab Results   Component Value Date    WBC 6.10 07/09/2022    HGB 13.8 (L) 07/09/2022    HCT 40.5 07/09/2022    MCV 91 07/09/2022     07/09/2022     Lab Results   Component Value Date    CHOL 127 10/20/2021    CHOL 122 07/20/2020    CHOL 129 01/13/2020     Lab Results   Component Value Date    HDL 40 10/20/2021    HDL 41 07/20/2020    HDL 41 01/13/2020     Lab Results   Component Value Date    LDLCALC 65.0 10/20/2021    LDLCALC 68.2 07/20/2020    LDLCALC 71.2 01/13/2020     Lab Results   Component Value Date    TRIG 110 10/20/2021    TRIG 64 07/20/2020    TRIG 84 01/13/2020     Lab Results   Component Value Date    CHOLHDL 31.5 10/20/2021    CHOLHDL 33.6 07/20/2020    CHOLHDL 31.8 01/13/2020     Lab Results   Component Value Date    TSH 1.987 12/16/2016       ASSESSMENT/PLAN     Herber Camarillo is a 81 y.o. male     SBO (small bowel obstruction)- physical exam benign. Will send for xray. Maradiaga tart small meals. Hydrate and ambulate   -     X-Ray Abdomen AP 1  View; Future; Expected date: 07/21/2022    Hypokalemia- stable. Will repeat bmp   -     CBC Auto Differential; Future; Expected date: 07/21/2022  -     Basic Metabolic Panel; Future; Expected date: 07/21/2022    Primary osteoarthritis of left knee/S/P total knee arthroplasty, left- stable. Will cont PT and f/u with ortho     Follow up with PCP     Patient education provided from [facility]. Patient was counseled on when and how to seek emergent care.       Vicki JENSEN, IRENE, FNP-c   Department of Internal Medicine - [facility]  8:50 AM

## 2022-07-22 ENCOUNTER — PATIENT MESSAGE (OUTPATIENT)
Dept: INTERNAL MEDICINE | Facility: CLINIC | Age: 82
End: 2022-07-22
Payer: MEDICARE

## 2022-07-28 ENCOUNTER — PATIENT MESSAGE (OUTPATIENT)
Dept: INTERNAL MEDICINE | Facility: CLINIC | Age: 82
End: 2022-07-28
Payer: MEDICARE

## 2022-08-04 ENCOUNTER — OFFICE VISIT (OUTPATIENT)
Dept: ORTHOPEDICS | Facility: CLINIC | Age: 82
End: 2022-08-04
Payer: MEDICARE

## 2022-08-04 VITALS — WEIGHT: 180.56 LBS | HEIGHT: 67 IN | BODY MASS INDEX: 28.34 KG/M2

## 2022-08-04 DIAGNOSIS — Z96.652 S/P TOTAL KNEE ARTHROPLASTY, LEFT: Primary | ICD-10-CM

## 2022-08-04 PROCEDURE — 1159F PR MEDICATION LIST DOCUMENTED IN MEDICAL RECORD: ICD-10-PCS | Mod: CPTII,S$GLB,, | Performed by: ORTHOPAEDIC SURGERY

## 2022-08-04 PROCEDURE — 1125F PR PAIN SEVERITY QUANTIFIED, PAIN PRESENT: ICD-10-PCS | Mod: CPTII,S$GLB,, | Performed by: ORTHOPAEDIC SURGERY

## 2022-08-04 PROCEDURE — 3288F FALL RISK ASSESSMENT DOCD: CPT | Mod: CPTII,S$GLB,, | Performed by: ORTHOPAEDIC SURGERY

## 2022-08-04 PROCEDURE — 99024 POSTOP FOLLOW-UP VISIT: CPT | Mod: S$GLB,,, | Performed by: ORTHOPAEDIC SURGERY

## 2022-08-04 PROCEDURE — 99024 PR POST-OP FOLLOW-UP VISIT: ICD-10-PCS | Mod: S$GLB,,, | Performed by: ORTHOPAEDIC SURGERY

## 2022-08-04 PROCEDURE — 3288F PR FALLS RISK ASSESSMENT DOCUMENTED: ICD-10-PCS | Mod: CPTII,S$GLB,, | Performed by: ORTHOPAEDIC SURGERY

## 2022-08-04 PROCEDURE — 1101F PR PT FALLS ASSESS DOC 0-1 FALLS W/OUT INJ PAST YR: ICD-10-PCS | Mod: CPTII,S$GLB,, | Performed by: ORTHOPAEDIC SURGERY

## 2022-08-04 PROCEDURE — 1159F MED LIST DOCD IN RCRD: CPT | Mod: CPTII,S$GLB,, | Performed by: ORTHOPAEDIC SURGERY

## 2022-08-04 PROCEDURE — 1125F AMNT PAIN NOTED PAIN PRSNT: CPT | Mod: CPTII,S$GLB,, | Performed by: ORTHOPAEDIC SURGERY

## 2022-08-04 PROCEDURE — 99999 PR PBB SHADOW E&M-EST. PATIENT-LVL III: ICD-10-PCS | Mod: PBBFAC,,, | Performed by: ORTHOPAEDIC SURGERY

## 2022-08-04 PROCEDURE — 1101F PT FALLS ASSESS-DOCD LE1/YR: CPT | Mod: CPTII,S$GLB,, | Performed by: ORTHOPAEDIC SURGERY

## 2022-08-04 PROCEDURE — 99999 PR PBB SHADOW E&M-EST. PATIENT-LVL III: CPT | Mod: PBBFAC,,, | Performed by: ORTHOPAEDIC SURGERY

## 2022-08-04 PROCEDURE — 1160F RVW MEDS BY RX/DR IN RCRD: CPT | Mod: CPTII,S$GLB,, | Performed by: ORTHOPAEDIC SURGERY

## 2022-08-04 PROCEDURE — 1160F PR REVIEW ALL MEDS BY PRESCRIBER/CLIN PHARMACIST DOCUMENTED: ICD-10-PCS | Mod: CPTII,S$GLB,, | Performed by: ORTHOPAEDIC SURGERY

## 2022-08-04 NOTE — PROGRESS NOTES
"  Herber Camarillo presents for initial post-operative visit following a left total knee arthroplasty performed by Dr. Nguyen on 6/20/2022. Doing well, was hospitalized for SBO and is doing much better.  Exam:   Height 5' 7" (1.702 m), weight 81.9 kg (180 lb 8.9 oz).   Ambulating well without assistive device.  Incision is clean and dry without drainage or erythema.   ROM: 3-110  NVI RLE, WWP    Initial post-operative radiographs reviewed today revealing a well fixed and aligned prosthesis.    A/P:  6 weeks s/p left total knee replacement  - Continue PT  - Ok to drive  - Continue home exercises, this was emphasized in detail to the pt and family  - Follow up in 6 weeks with surgeon. Pt will call clinic with problems/concerns.   "

## 2022-08-10 PROBLEM — Z96.652 S/P TOTAL KNEE ARTHROPLASTY, LEFT: Status: ACTIVE | Noted: 2022-08-10

## 2022-08-10 PROBLEM — M17.12 PRIMARY OSTEOARTHRITIS OF LEFT KNEE: Status: RESOLVED | Noted: 2022-01-07 | Resolved: 2022-08-10

## 2022-08-15 ENCOUNTER — TELEPHONE (OUTPATIENT)
Dept: ENDOSCOPY | Facility: HOSPITAL | Age: 82
End: 2022-08-15
Payer: MEDICARE

## 2022-08-17 ENCOUNTER — OFFICE VISIT (OUTPATIENT)
Dept: GASTROENTEROLOGY | Facility: CLINIC | Age: 82
End: 2022-08-17
Payer: MEDICARE

## 2022-08-17 VITALS
HEART RATE: 69 BPM | WEIGHT: 179.69 LBS | BODY MASS INDEX: 28.2 KG/M2 | DIASTOLIC BLOOD PRESSURE: 77 MMHG | HEIGHT: 67 IN | SYSTOLIC BLOOD PRESSURE: 123 MMHG

## 2022-08-17 DIAGNOSIS — R19.7 DIARRHEA, UNSPECIFIED TYPE: Primary | ICD-10-CM

## 2022-08-17 DIAGNOSIS — R11.0 NAUSEA: ICD-10-CM

## 2022-08-17 PROCEDURE — 1125F PR PAIN SEVERITY QUANTIFIED, PAIN PRESENT: ICD-10-PCS | Mod: CPTII,GC,S$GLB, | Performed by: STUDENT IN AN ORGANIZED HEALTH CARE EDUCATION/TRAINING PROGRAM

## 2022-08-17 PROCEDURE — 1125F AMNT PAIN NOTED PAIN PRSNT: CPT | Mod: CPTII,GC,S$GLB, | Performed by: STUDENT IN AN ORGANIZED HEALTH CARE EDUCATION/TRAINING PROGRAM

## 2022-08-17 PROCEDURE — 3074F PR MOST RECENT SYSTOLIC BLOOD PRESSURE < 130 MM HG: ICD-10-PCS | Mod: CPTII,GC,S$GLB, | Performed by: STUDENT IN AN ORGANIZED HEALTH CARE EDUCATION/TRAINING PROGRAM

## 2022-08-17 PROCEDURE — 3288F FALL RISK ASSESSMENT DOCD: CPT | Mod: CPTII,GC,S$GLB, | Performed by: STUDENT IN AN ORGANIZED HEALTH CARE EDUCATION/TRAINING PROGRAM

## 2022-08-17 PROCEDURE — 1101F PT FALLS ASSESS-DOCD LE1/YR: CPT | Mod: CPTII,GC,S$GLB, | Performed by: STUDENT IN AN ORGANIZED HEALTH CARE EDUCATION/TRAINING PROGRAM

## 2022-08-17 PROCEDURE — 3078F PR MOST RECENT DIASTOLIC BLOOD PRESSURE < 80 MM HG: ICD-10-PCS | Mod: CPTII,GC,S$GLB, | Performed by: STUDENT IN AN ORGANIZED HEALTH CARE EDUCATION/TRAINING PROGRAM

## 2022-08-17 PROCEDURE — 99204 PR OFFICE/OUTPT VISIT, NEW, LEVL IV, 45-59 MIN: ICD-10-PCS | Mod: GC,S$GLB,, | Performed by: STUDENT IN AN ORGANIZED HEALTH CARE EDUCATION/TRAINING PROGRAM

## 2022-08-17 PROCEDURE — 99204 OFFICE O/P NEW MOD 45 MIN: CPT | Mod: GC,S$GLB,, | Performed by: STUDENT IN AN ORGANIZED HEALTH CARE EDUCATION/TRAINING PROGRAM

## 2022-08-17 PROCEDURE — 3078F DIAST BP <80 MM HG: CPT | Mod: CPTII,GC,S$GLB, | Performed by: STUDENT IN AN ORGANIZED HEALTH CARE EDUCATION/TRAINING PROGRAM

## 2022-08-17 PROCEDURE — 1159F PR MEDICATION LIST DOCUMENTED IN MEDICAL RECORD: ICD-10-PCS | Mod: CPTII,GC,S$GLB, | Performed by: STUDENT IN AN ORGANIZED HEALTH CARE EDUCATION/TRAINING PROGRAM

## 2022-08-17 PROCEDURE — 3074F SYST BP LT 130 MM HG: CPT | Mod: CPTII,GC,S$GLB, | Performed by: STUDENT IN AN ORGANIZED HEALTH CARE EDUCATION/TRAINING PROGRAM

## 2022-08-17 PROCEDURE — 1101F PR PT FALLS ASSESS DOC 0-1 FALLS W/OUT INJ PAST YR: ICD-10-PCS | Mod: CPTII,GC,S$GLB, | Performed by: STUDENT IN AN ORGANIZED HEALTH CARE EDUCATION/TRAINING PROGRAM

## 2022-08-17 PROCEDURE — 99999 PR PBB SHADOW E&M-EST. PATIENT-LVL IV: ICD-10-PCS | Mod: PBBFAC,GC,, | Performed by: STUDENT IN AN ORGANIZED HEALTH CARE EDUCATION/TRAINING PROGRAM

## 2022-08-17 PROCEDURE — 99499 UNLISTED E&M SERVICE: CPT | Mod: HCNC,S$GLB,, | Performed by: STUDENT IN AN ORGANIZED HEALTH CARE EDUCATION/TRAINING PROGRAM

## 2022-08-17 PROCEDURE — 99499 RISK ADDL DX/OHS AUDIT: ICD-10-PCS | Mod: HCNC,S$GLB,, | Performed by: STUDENT IN AN ORGANIZED HEALTH CARE EDUCATION/TRAINING PROGRAM

## 2022-08-17 PROCEDURE — 99999 PR PBB SHADOW E&M-EST. PATIENT-LVL IV: CPT | Mod: PBBFAC,GC,, | Performed by: STUDENT IN AN ORGANIZED HEALTH CARE EDUCATION/TRAINING PROGRAM

## 2022-08-17 PROCEDURE — 3288F PR FALLS RISK ASSESSMENT DOCUMENTED: ICD-10-PCS | Mod: CPTII,GC,S$GLB, | Performed by: STUDENT IN AN ORGANIZED HEALTH CARE EDUCATION/TRAINING PROGRAM

## 2022-08-17 PROCEDURE — 1159F MED LIST DOCD IN RCRD: CPT | Mod: CPTII,GC,S$GLB, | Performed by: STUDENT IN AN ORGANIZED HEALTH CARE EDUCATION/TRAINING PROGRAM

## 2022-08-17 RX ORDER — ONDANSETRON 4 MG/1
4 TABLET, FILM COATED ORAL EVERY 8 HOURS PRN
Qty: 30 TABLET | Refills: 0 | Status: SHIPPED | OUTPATIENT
Start: 2022-08-17 | End: 2022-08-17

## 2022-08-17 RX ORDER — ONDANSETRON 4 MG/1
4 TABLET, FILM COATED ORAL EVERY 8 HOURS PRN
Qty: 30 TABLET | Refills: 0 | Status: SHIPPED | OUTPATIENT
Start: 2022-08-17 | End: 2023-05-18

## 2022-08-17 NOTE — PATIENT INSTRUCTIONS
- we will schedule you for an upper endoscopy to evaluate your nausea  - please submit a liquid stool sample to evaluate you for an infection  - take zofran as needed for nausea

## 2022-08-17 NOTE — PROGRESS NOTES
[facility]    Reason for visit: The primary encounter diagnosis was Diarrhea, unspecified type. A diagnosis of Nausea was also pertinent to this visit.  Referring Provider/PCP: Fidelina Loyola MD    History of Present Illness:  Herber Camarillo is a 82 y.o. male with a history of CAD, HLD who is presenting for initial evaluation of diarrhea, nausea.     The patient underwent knee replacement surgery in June, discharge from the hospital and few days later he presented to the hospital again with vomiting and a small bowel obstruction.  He was treated conservatively with NG tube suction, NPO and improved 6 days later.  Since discharge, he has been having nausea associated with abdominal distension, followed by diarrhea every 3rd day.  He reports that the initial stool is semi-formed followed by multiple episodes of diarrhea and then does not have a bowel movement for another 2 days before this starts again.  He has not been eating much due to nausea.  Denies any abdominal pain, vomiting, blood in the stool.  This has never happened to him before and prior to the surgery he had regular bowel movements.  He was seen by his PCP after hospitalization and repeat KUB revealed resolution of bowel obstruction.  Labs at that time were unremarkable.    PEndoHx:  Colonoscopy 2006 - normal    Review of Systems   Gastrointestinal: Positive for diarrhea and nausea.       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       Past  Surgical History:   Procedure Laterality Date    CARDIAC CATHETERIZATION  01/2000    CATARACT EXTRACTION W/  INTRAOCULAR LENS IMPLANT Left 9/28/2020    Procedure: EXTRACTION, CATARACT, WITH IOL INSERTION;  Surgeon: Nam Morin MD;  Location: [facility];  Service: Ophthalmology;  Laterality: Left;    CATARACT EXTRACTION W/  INTRAOCULAR LENS IMPLANT Right 10/12/2020    Procedure: EXTRACTION, CATARACT, WITH IOL INSERTION;  Surgeon: Nam Morin MD;  Location: [facility];  Service: Ophthalmology;  Laterality: Right;  laser    CHOLECYSTECTOMY      HERNIA REPAIR      HIP SURGERY Right 01/11/2017    THR    KNEE ARTHROPLASTY Left 6/20/2022    Procedure: ARTHROPLASTY, KNEE/ KAVON NEXGEN/ CEMENTED TIBIA;  Surgeon: Rodolfo Nguyen MD;  Location[facility] OR;  Service: Orthopedics;  Laterality: Left;    PROSTATE SURGERY      SHOULDER OPEN ROTATOR CUFF REPAIR      TONSILLECTOMY         Family History   Problem Relation Age of Onset    Heart disease Father     Cancer Sister         Lung    Heart disease Brother     Heart disease Brother     Heart disease Mother     Hyperlipidemia Daughter     No Known Problems Son     No Known Problems Daughter     Amblyopia Neg Hx     Blindness Neg Hx     Cataracts Neg Hx     Diabetes Neg Hx     Glaucoma Neg Hx     Hypertension Neg Hx     Macular degeneration Neg Hx     Retinal detachment Neg Hx     Strabismus Neg Hx     Stroke Neg Hx     Thyroid disease Neg Hx     Melanoma Neg Hx     Psoriasis Neg Hx     Lupus Neg Hx     Eczema Neg Hx     Acne Neg Hx     Asthma Neg Hx     Emphysema Neg Hx        Social History     Socioeconomic History    Marital status:    Occupational History    Occupation: Retired     Employer: RETIRED   Tobacco Use    Smoking status: Never Smoker    Smokeless tobacco: Never Used   Substance and Sexual Activity    Alcohol use: No     Alcohol/week: 0.0 standard drinks    Drug use: No   Social History Narrative    Originally from  [place]; retired from [employer] in [employer]; distant cousin of former NidiaSharda Herber CARRANZA Marquise     Social Determinants of Health     Financial Resource Strain: Low Risk     Difficulty of Paying Living Expenses: Not hard at all   Food Insecurity: No Food Insecurity    Worried About Running Out of Food in the Last Year: Never true    Ran Out of Food in the Last Year: Never true   Transportation Needs: No Transportation Needs    Lack of Transportation (Medical): No    Lack of Transportation (Non-Medical): No   Physical Activity: Insufficiently Active    Days of Exercise per Week: 4 days    Minutes of Exercise per Session: 30 min   Stress: No Stress Concern Present    Feeling of Stress : Not at all   Social Connections: Unknown    Frequency of Communication with Friends and Family: More than three times a week    Frequency of Social Gatherings with Friends and Family: More than three times a week    Active Member of Clubs or Organizations: Yes    Attends Club or Organization Meetings: 1 to 4 times per year    Marital Status:    Housing Stability: Low Risk     Unable to Pay for Housing in the Last Year: No    Number of Places Lived in the Last Year: 1    Unstable Housing in the Last Year: No       Current Outpatient Medications on File Prior to Visit   Medication Sig Dispense Refill    acetaminophen (TYLENOL) 650 MG TbSR Take 1 tablet (650 mg total) by mouth every 8 (eight) hours. 120 tablet 0    aspirin (ECOTRIN) 81 MG EC tablet Take 1 tablet (81 mg total) by mouth 2 (two) times a day. 60 tablet 0    atorvastatin (LIPITOR) 20 MG tablet Take 1 tablet (20 mg total) by mouth once daily. 90 tablet 3    b complex vitamins tablet Take 1 tablet by mouth once daily.      metoprolol succinate (TOPROL-XL) 25 MG 24 hr tablet Take 1 tablet (25 mg total) by mouth once daily. 90 tablet 3    omega-3 fatty acids-vitamin E 1,000 mg Cap Take by mouth.       No current facility-administered medications on file prior to visit.        Review of patient's allergies indicates:   Allergen Reactions    Novocain [procaine] Shortness Of Breath             Physical Exam:  Constitutional:  not in acute distress and well developed  HENT: Head: Normal, normocephalic, atraumatic.  Eyes: conjunctiva clear and sclera nonicteric  Cardiovascular: regular rate and rhythm  Respiratory: normal chest expansion & respiratory effort   and no accessory muscle use  GI: soft, non-tender, without masses or organomegaly  Musculoskeletal: no muscular tenderness noted  Skin: normal color  Neurological: alert, oriented x3  Psychiatric: mood and affect are within normal limits, pt is a good historian; no memory problems were noted    Laboratory:  Lab Results   Component Value Date     07/21/2022    K 3.7 07/21/2022     07/21/2022    CO2 26 07/21/2022    BUN 11 07/21/2022    CREATININE 1.1 07/21/2022    CALCIUM 10.3 07/21/2022    ANIONGAP 11 07/21/2022    ESTGFRAFRICA >60.0 07/21/2022    EGFRNONAA >60.0 07/21/2022       Lab Results   Component Value Date    ALT 23 07/09/2022    AST 25 07/09/2022    ALKPHOS 77 07/09/2022    BILITOT 1.0 07/09/2022       Lab Results   Component Value Date    WBC 8.02 07/21/2022    HGB 16.4 07/21/2022    HCT 53.1 07/21/2022     (H) 07/21/2022     (H) 07/21/2022       Microbiology:  No Pertinent Microbiology    Imaging:  CT A/P reviewed.    Assessment:  Herber Camarillo is a 82 y.o. male who is presenting for initial evaluation of nausea, diarrhea.    Problems:  1. Nausea  2. Diarrhea   3. Hx of SBO    The patient presents with recurrent diarrhea every 3rd day, as well as nausea and abdominal distention.  This started after he developed a small-bowel obstruction that spontaneously resolved with supportive care.  Will evaluate further with EGD as well as stool studies.  Zofran p.r.n. for nausea.    Plan:  1. Stool studies  2. EGD  3. Zofran p.r.n.  4. Follow-up pending the above    Jocelyn Allison MD  Gastroenterology  Fellow    Orders Placed This Encounter   Procedures    Clostridium difficile EIA    Stool culture    Giardia / Cryptosporidum, EIA    Ambulatory referral/consult to Endo Procedure

## 2022-08-18 NOTE — PROGRESS NOTES
I have reviewed the notes, assessments, and/or procedures performed this visit, and I concur with the documentation.    This gentleman without any significant GI history presented with apparent small bowel obstruction shortly after total knee replacement.  The bowel obstruction resolved with NG tube suction and IV fluids.  However he has had residual GI symptoms since that admission.  He has had persistent diarrhea, although curiously the diarrhea is not present every day.  But he has also had nausea and early satiety and anorexia.  For the latter I agree with recommendations for an EGD.  Will get stool samples for now.  Neither the patient nor the family is interested at this time in a colonoscopy understanding he might be too weak for the prep. will follow-up in the office.

## 2022-09-06 ENCOUNTER — PATIENT MESSAGE (OUTPATIENT)
Dept: ADMINISTRATIVE | Facility: OTHER | Age: 82
End: 2022-09-06
Payer: MEDICARE

## 2022-09-13 ENCOUNTER — OFFICE VISIT (OUTPATIENT)
Dept: ORTHOPEDICS | Facility: CLINIC | Age: 82
End: 2022-09-13
Payer: MEDICARE

## 2022-09-13 VITALS — BODY MASS INDEX: 28.89 KG/M2 | HEIGHT: 67 IN | WEIGHT: 184.06 LBS

## 2022-09-13 DIAGNOSIS — M17.11 PRIMARY OSTEOARTHRITIS OF RIGHT KNEE: Primary | ICD-10-CM

## 2022-09-13 DIAGNOSIS — Z96.652 S/P TOTAL KNEE ARTHROPLASTY, LEFT: ICD-10-CM

## 2022-09-13 PROCEDURE — 1101F PT FALLS ASSESS-DOCD LE1/YR: CPT | Mod: CPTII,S$GLB,, | Performed by: ORTHOPAEDIC SURGERY

## 2022-09-13 PROCEDURE — 1159F PR MEDICATION LIST DOCUMENTED IN MEDICAL RECORD: ICD-10-PCS | Mod: CPTII,S$GLB,, | Performed by: ORTHOPAEDIC SURGERY

## 2022-09-13 PROCEDURE — 99024 PR POST-OP FOLLOW-UP VISIT: ICD-10-PCS | Mod: S$GLB,,, | Performed by: ORTHOPAEDIC SURGERY

## 2022-09-13 PROCEDURE — 99999 PR PBB SHADOW E&M-EST. PATIENT-LVL III: CPT | Mod: PBBFAC,,, | Performed by: ORTHOPAEDIC SURGERY

## 2022-09-13 PROCEDURE — 1126F AMNT PAIN NOTED NONE PRSNT: CPT | Mod: CPTII,S$GLB,, | Performed by: ORTHOPAEDIC SURGERY

## 2022-09-13 PROCEDURE — 1160F RVW MEDS BY RX/DR IN RCRD: CPT | Mod: CPTII,S$GLB,, | Performed by: ORTHOPAEDIC SURGERY

## 2022-09-13 PROCEDURE — 99024 POSTOP FOLLOW-UP VISIT: CPT | Mod: S$GLB,,, | Performed by: ORTHOPAEDIC SURGERY

## 2022-09-13 PROCEDURE — 99999 PR PBB SHADOW E&M-EST. PATIENT-LVL III: ICD-10-PCS | Mod: PBBFAC,,, | Performed by: ORTHOPAEDIC SURGERY

## 2022-09-13 PROCEDURE — 1126F PR PAIN SEVERITY QUANTIFIED, NO PAIN PRESENT: ICD-10-PCS | Mod: CPTII,S$GLB,, | Performed by: ORTHOPAEDIC SURGERY

## 2022-09-13 PROCEDURE — 3288F FALL RISK ASSESSMENT DOCD: CPT | Mod: CPTII,S$GLB,, | Performed by: ORTHOPAEDIC SURGERY

## 2022-09-13 PROCEDURE — 1159F MED LIST DOCD IN RCRD: CPT | Mod: CPTII,S$GLB,, | Performed by: ORTHOPAEDIC SURGERY

## 2022-09-13 PROCEDURE — 1101F PR PT FALLS ASSESS DOC 0-1 FALLS W/OUT INJ PAST YR: ICD-10-PCS | Mod: CPTII,S$GLB,, | Performed by: ORTHOPAEDIC SURGERY

## 2022-09-13 PROCEDURE — 3288F PR FALLS RISK ASSESSMENT DOCUMENTED: ICD-10-PCS | Mod: CPTII,S$GLB,, | Performed by: ORTHOPAEDIC SURGERY

## 2022-09-13 PROCEDURE — 1160F PR REVIEW ALL MEDS BY PRESCRIBER/CLIN PHARMACIST DOCUMENTED: ICD-10-PCS | Mod: CPTII,S$GLB,, | Performed by: ORTHOPAEDIC SURGERY

## 2022-09-16 NOTE — PROGRESS NOTES
Subjective:      Patient ID: Herber Camarillo is a 82 y.o. male.    Chief Complaint:   Post-op Evaluation of the Left Knee    HPI  He comes in about 3 months out from left TKA.  He has no pain now, or usually during the day, but he does have up to 4/10 at night.  No new symptoms to report.  He is generally pleased with his progress thus far.      Objective:        Ortho/SPM Exam    On exam, the scar is well healed without redness or tenderness.  There is no effusion.  Active range of motion is 0-115° without crepitus.  No instability to varus/valgus stress in extension or flexion.  No excessive sagittal plane instability.  Calves soft, nontender.  Distal neurovascular intact.        IMAGING:  None today  Assessment:   Progressing well status post left TKA      Plan:   Home exercise program and pain management measures were discussed in detail with the patient and his family members.  I will see him for anniversary follow-up with x-rays.    The patient's pathophysiology was explained in detail with reference to x-rays, models, other visual aids as appropriate.  Treatment options were discussed in detail.  Questions were invited and answered to the patient's satisfaction.      Rodolfo Nguyen MD  Orthopedic Surgery

## 2023-03-22 ENCOUNTER — OFFICE VISIT (OUTPATIENT)
Dept: OPTOMETRY | Facility: CLINIC | Age: 83
End: 2023-03-22
Payer: MEDICARE

## 2023-03-22 DIAGNOSIS — Z96.1 PSEUDOPHAKIA OF BOTH EYES: Primary | ICD-10-CM

## 2023-03-22 DIAGNOSIS — H04.123 DRY EYE SYNDROME OF BOTH EYES: ICD-10-CM

## 2023-03-22 PROCEDURE — 92014 COMPRE OPH EXAM EST PT 1/>: CPT | Mod: HCNC,S$GLB,, | Performed by: OPTOMETRIST

## 2023-03-22 PROCEDURE — 3288F FALL RISK ASSESSMENT DOCD: CPT | Mod: HCNC,CPTII,S$GLB, | Performed by: OPTOMETRIST

## 2023-03-22 PROCEDURE — 1101F PR PT FALLS ASSESS DOC 0-1 FALLS W/OUT INJ PAST YR: ICD-10-PCS | Mod: HCNC,CPTII,S$GLB, | Performed by: OPTOMETRIST

## 2023-03-22 PROCEDURE — 1159F MED LIST DOCD IN RCRD: CPT | Mod: HCNC,CPTII,S$GLB, | Performed by: OPTOMETRIST

## 2023-03-22 PROCEDURE — 92014 PR EYE EXAM, EST PATIENT,COMPREHESV: ICD-10-PCS | Mod: HCNC,S$GLB,, | Performed by: OPTOMETRIST

## 2023-03-22 PROCEDURE — 1126F PR PAIN SEVERITY QUANTIFIED, NO PAIN PRESENT: ICD-10-PCS | Mod: HCNC,CPTII,S$GLB, | Performed by: OPTOMETRIST

## 2023-03-22 PROCEDURE — 1101F PT FALLS ASSESS-DOCD LE1/YR: CPT | Mod: HCNC,CPTII,S$GLB, | Performed by: OPTOMETRIST

## 2023-03-22 PROCEDURE — 1159F PR MEDICATION LIST DOCUMENTED IN MEDICAL RECORD: ICD-10-PCS | Mod: HCNC,CPTII,S$GLB, | Performed by: OPTOMETRIST

## 2023-03-22 PROCEDURE — 99999 PR PBB SHADOW E&M-EST. PATIENT-LVL II: ICD-10-PCS | Mod: PBBFAC,HCNC,, | Performed by: OPTOMETRIST

## 2023-03-22 PROCEDURE — 99999 PR PBB SHADOW E&M-EST. PATIENT-LVL II: CPT | Mod: PBBFAC,HCNC,, | Performed by: OPTOMETRIST

## 2023-03-22 PROCEDURE — 3288F PR FALLS RISK ASSESSMENT DOCUMENTED: ICD-10-PCS | Mod: HCNC,CPTII,S$GLB, | Performed by: OPTOMETRIST

## 2023-03-22 PROCEDURE — 1126F AMNT PAIN NOTED NONE PRSNT: CPT | Mod: HCNC,CPTII,S$GLB, | Performed by: OPTOMETRIST

## 2023-03-22 NOTE — PROGRESS NOTES
HPI    CC: Pt is here today for a Routine exam. He states his vision is doing   well at both ranges.     MAURICE: 2020    (-) Changes in vision   (-) Pain  (-) Irritation   (-) Itching   (-) Flashes  (-) Floaters  (-) Glasses wearer  (-) CL wearer  (-) Uses eye gtts    Does patient want a refraction today? If needed    (-) Eye injury  (+) Eye surgery, Cataract OU  (-)POHx  (-)FOHx    (-)DM       Last edited by Vickie Guan, OD on 3/22/2023 11:08 AM.            Assessment /Plan     For exam results, see Encounter Report.    Pseudophakia of both eyes    Dry eye syndrome of both eyes      MF PCIOL clear and centered OU. Monitor yearly.      2. Educated pt on findings. Recommended ATs BID-TID for added lubrication and comfort. If symptoms worsen or dont improve, RTC. Monitor.        RTC in 1 year for annual eye exam or sooner if needed.

## 2023-05-18 ENCOUNTER — OFFICE VISIT (OUTPATIENT)
Dept: INTERNAL MEDICINE | Facility: CLINIC | Age: 83
End: 2023-05-18
Payer: MEDICARE

## 2023-05-18 ENCOUNTER — HOSPITAL ENCOUNTER (OUTPATIENT)
Dept: RADIOLOGY | Facility: HOSPITAL | Age: 83
Discharge: HOME | End: 2023-05-18
Attending: INTERNAL MEDICINE
Payer: MEDICARE

## 2023-05-18 VITALS
SYSTOLIC BLOOD PRESSURE: 125 MMHG | BODY MASS INDEX: 30.45 KG/M2 | DIASTOLIC BLOOD PRESSURE: 60 MMHG | WEIGHT: 194 LBS | HEIGHT: 67 IN

## 2023-05-18 DIAGNOSIS — Z12.5 ENCOUNTER FOR SCREENING FOR MALIGNANT NEOPLASM OF PROSTATE: ICD-10-CM

## 2023-05-18 DIAGNOSIS — L57.8 ACTINIC DERMATITIS: ICD-10-CM

## 2023-05-18 DIAGNOSIS — Z87.442 HISTORY OF KIDNEY STONES: ICD-10-CM

## 2023-05-18 DIAGNOSIS — C61 PROSTATE CANCER: ICD-10-CM

## 2023-05-18 DIAGNOSIS — E78.2 MIXED HYPERLIPIDEMIA: ICD-10-CM

## 2023-05-18 DIAGNOSIS — Z00.00 ANNUAL PHYSICAL EXAM: Primary | ICD-10-CM

## 2023-05-18 DIAGNOSIS — I25.10 CORONARY ARTERY DISEASE INVOLVING NATIVE CORONARY ARTERY OF NATIVE HEART WITHOUT ANGINA PECTORIS: ICD-10-CM

## 2023-05-18 DIAGNOSIS — E79.89 URIC ACID CRYSTALLOPATHY: ICD-10-CM

## 2023-05-18 DIAGNOSIS — I70.0 ATHEROSCLEROSIS OF AORTA: ICD-10-CM

## 2023-05-18 DIAGNOSIS — E66.09 NON MORBID OBESITY DUE TO EXCESS CALORIES: ICD-10-CM

## 2023-05-18 DIAGNOSIS — R91.1 LUNG NODULE, SOLITARY: ICD-10-CM

## 2023-05-18 DIAGNOSIS — E53.8 LOW SERUM VITAMIN B12: ICD-10-CM

## 2023-05-18 PROBLEM — K56.609 SBO (SMALL BOWEL OBSTRUCTION): Status: RESOLVED | Noted: 2022-07-03 | Resolved: 2023-05-18

## 2023-05-18 PROCEDURE — 74019 RADEX ABDOMEN 2 VIEWS: CPT | Mod: 26,HCNC,, | Performed by: RADIOLOGY

## 2023-05-18 PROCEDURE — 99397 PER PM REEVAL EST PAT 65+ YR: CPT | Mod: HCNC,,, | Performed by: INTERNAL MEDICINE

## 2023-05-18 PROCEDURE — 74019 RADEX ABDOMEN 2 VIEWS: CPT | Mod: TC,HCNC

## 2023-05-18 PROCEDURE — 1159F PR MEDICATION LIST DOCUMENTED IN MEDICAL RECORD: ICD-10-PCS | Mod: HCNC,CPTII,, | Performed by: INTERNAL MEDICINE

## 2023-05-18 PROCEDURE — 1126F PR PAIN SEVERITY QUANTIFIED, NO PAIN PRESENT: ICD-10-PCS | Mod: HCNC,CPTII,, | Performed by: INTERNAL MEDICINE

## 2023-05-18 PROCEDURE — 99214 OFFICE O/P EST MOD 30 MIN: CPT | Mod: HCNC | Performed by: INTERNAL MEDICINE

## 2023-05-18 PROCEDURE — 74019 XR ABDOMEN FLAT AND ERECT: ICD-10-PCS | Mod: 26,HCNC,, | Performed by: RADIOLOGY

## 2023-05-18 PROCEDURE — 99397 PR PREVENTIVE VISIT,EST,65 & OVER: ICD-10-PCS | Mod: HCNC,,, | Performed by: INTERNAL MEDICINE

## 2023-05-18 PROCEDURE — 3074F SYST BP LT 130 MM HG: CPT | Mod: HCNC,CPTII,, | Performed by: INTERNAL MEDICINE

## 2023-05-18 PROCEDURE — 99999 PR PBB SHADOW E&M-EST. PATIENT-LVL IV: ICD-10-PCS | Mod: PBBFAC,HCNC,, | Performed by: INTERNAL MEDICINE

## 2023-05-18 PROCEDURE — 1159F MED LIST DOCD IN RCRD: CPT | Mod: HCNC,CPTII,, | Performed by: INTERNAL MEDICINE

## 2023-05-18 PROCEDURE — 99999 PR PBB SHADOW E&M-EST. PATIENT-LVL IV: CPT | Mod: PBBFAC,HCNC,, | Performed by: INTERNAL MEDICINE

## 2023-05-18 PROCEDURE — 1126F AMNT PAIN NOTED NONE PRSNT: CPT | Mod: HCNC,CPTII,, | Performed by: INTERNAL MEDICINE

## 2023-05-18 PROCEDURE — 1160F PR REVIEW ALL MEDS BY PRESCRIBER/CLIN PHARMACIST DOCUMENTED: ICD-10-PCS | Mod: HCNC,CPTII,, | Performed by: INTERNAL MEDICINE

## 2023-05-18 PROCEDURE — 3078F DIAST BP <80 MM HG: CPT | Mod: HCNC,CPTII,, | Performed by: INTERNAL MEDICINE

## 2023-05-18 PROCEDURE — 1160F RVW MEDS BY RX/DR IN RCRD: CPT | Mod: HCNC,CPTII,, | Performed by: INTERNAL MEDICINE

## 2023-05-18 PROCEDURE — 3078F PR MOST RECENT DIASTOLIC BLOOD PRESSURE < 80 MM HG: ICD-10-PCS | Mod: HCNC,CPTII,, | Performed by: INTERNAL MEDICINE

## 2023-05-18 PROCEDURE — 3074F PR MOST RECENT SYSTOLIC BLOOD PRESSURE < 130 MM HG: ICD-10-PCS | Mod: HCNC,CPTII,, | Performed by: INTERNAL MEDICINE

## 2023-05-18 RX ORDER — ASPIRIN 81 MG/1
81 TABLET ORAL DAILY
Qty: 30 TABLET | Refills: 12
Start: 2023-05-18 | End: 2024-05-17

## 2023-05-18 NOTE — PROGRESS NOTES
Patient ID: Herber Camarillo is a 82 y.o. male.    Chief Complaint: Annual Exam      Assessment:       1. Annual physical exam    2. Non morbid obesity due to excess calories    3. Prostate cancer    4. Uric acid crystallopathy    5. Atherosclerosis of aorta    6. Mixed hyperlipidemia    7. Coronary artery disease involving native coronary artery of native heart without angina pectoris    8. Lung nodule, solitary .7 cm 7/15; stable 12/16, also 2/19    9. Encounter for screening for malignant neoplasm of prostate          Plan:         1. Annual physical exam    2. Non morbid obesity due to excess calories    3. Prostate cancer    4. Uric acid crystallopathy  -     Uric Acid; Future; Expected date: 05/18/2023    5. Atherosclerosis of aorta    6. Mixed hyperlipidemia    7. Coronary artery disease involving native coronary artery of native heart without angina pectoris    8. Lung nodule, solitary .7 cm 7/15; stable 12/16, also 2/19    9. Encounter for screening for malignant neoplasm of prostate  -     PSA, Screening; Future; Expected date: 05/18/2023    10. Low serum vitamin B12  -     Vitamin B12; Future; Expected date: 05/18/2023    11. Actinic dermatitis  -     Ambulatory referral/consult to Dermatology; Future; Expected date: 05/25/2023    12. History of kidney stones: non obstructing 2021  -     X-Ray Abdomen Flat And Erect; Future; Expected date: 05/18/2023    Other orders  -     aspirin (ECOTRIN) 81 MG EC tablet; Take 1 tablet (81 mg total) by mouth once daily.  Dispense: 30 tablet; Refill: 12     Labs reviewed   Keep Cardiology follow-up   He and wife declined COVID booster   Portion control, exercise and slow and steady weight loss recommendations reviewed   Kidney stone issues discussed, he is having no symptoms   Discussed EMG or Ortho assessment for his hand symptoms, he currently declines   Dermatology follow-up   I will review all studies and determine further tx depending on findings       Subjective:     Annual exam, wife with him.     Hospitalized last year with SBO, stable currently.  This was likely in the context of anesthesia for knee surgery.  No further issues.  Bowels moving well.  No abdominal pain, nausea or vomiting.  May have loose stools but not hoa diarrhea and only has 1 BM a day without any blood or mucus.  No weight loss in fact, has gained back all the weight that he lost last year.      Labs done recently for his cardiologist, all acceptable, reviewed.      BP doing well.  BMI 30, discussed.      He has no chest pain, cough, shortness of breath or hemoptysis.  He ask whether he needs a CT scan.  Discussed that the lung nodule that he has been found to have on a CT scan was stable and radiology did not recommend further follow-up unless symptoms occurred.  He reiterates that he is having no symptoms.    Previous history of kidney stones, no current issues.    Patient Active Problem List:     Uric acid crystallopathy     Hyperlipidemia     Prostate cancer: discharged from the urology clinic in 2011.     Atherosclerosis of aorta: see CT scan 7/15     Lung nodule, solitary .7 cm 7/15; stable 12/16, also 2/19     Coronary artery disease involving native coronary artery of native heart without angina pectoris     Non morbid obesity due to excess calories     Primary osteoarthritis of right hip     Primary osteoarthritis of right knee     Impaired mobility and ADLs     Hypokalemia     S/P total knee arthroplasty, left             Review of Systems   Constitutional:  Negative for activity change and unexpected weight change.   HENT:  Negative for hearing loss, rhinorrhea and trouble swallowing.    Eyes:  Negative for discharge and visual disturbance.   Respiratory:  Negative for chest tightness and wheezing.    Cardiovascular:  Negative for chest pain and palpitations.   Gastrointestinal:  Positive for diarrhea. Negative for blood in stool, constipation and vomiting.        Loose stools occasionally but  no more than once daily   Endocrine: Negative for polydipsia and polyuria.   Genitourinary:  Negative for difficulty urinating, hematuria and urgency.   Musculoskeletal:  Positive for arthralgias. Negative for joint swelling and neck pain.        Some numbness both hands  Sometimes with sleep  Sometimes with using iPad  Otherwise, chronic stable arthralgias   Neurological:  Negative for weakness and headaches.   Psychiatric/Behavioral:  Negative for confusion and dysphoric mood.        Objective:      Physical Exam  Constitutional:       Appearance: He is well-developed.   HENT:      Head: Normocephalic and atraumatic.      Right Ear: External ear normal.      Left Ear: External ear normal.   Eyes:      Extraocular Movements: Extraocular movements intact.      Conjunctiva/sclera: Conjunctivae normal.   Neck:      Thyroid: No thyromegaly.   Cardiovascular:      Rate and Rhythm: Normal rate and regular rhythm.      Heart sounds: No murmur heard.  Pulmonary:      Effort: Pulmonary effort is normal. No respiratory distress.      Breath sounds: Normal breath sounds. No wheezing.   Abdominal:      General: There is no distension.      Palpations: Abdomen is soft.      Tenderness: There is no abdominal tenderness.   Musculoskeletal:         General: No tenderness.      Cervical back: Normal range of motion and neck supple.      Right lower leg: No edema.      Left lower leg: No edema.   Lymphadenopathy:      Cervical: No cervical adenopathy.   Skin:     General: Skin is warm and dry.      Comments: Actinic dermatitis, deeply tanned.   Neurological:      General: No focal deficit present.      Mental Status: He is alert and oriented to person, place, and time.      Cranial Nerves: No cranial nerve deficit.   Psychiatric:         Mood and Affect: Mood normal.         Behavior: Behavior normal.         Thought Content: Thought content normal.         Judgment: Judgment normal.           Health Maintenance Due   Topic Date Due     COVID-19 Vaccine (4 - Booster for Moderna series) 03/08/2022    PROSTATE-SPECIFIC ANTIGEN  03/31/2022

## 2023-05-23 ENCOUNTER — OFFICE VISIT (OUTPATIENT)
Dept: CARDIOLOGY | Facility: CLINIC | Age: 83
End: 2023-05-23
Payer: MEDICARE

## 2023-05-23 VITALS
SYSTOLIC BLOOD PRESSURE: 136 MMHG | HEIGHT: 67 IN | DIASTOLIC BLOOD PRESSURE: 63 MMHG | WEIGHT: 192.88 LBS | HEART RATE: 58 BPM | BODY MASS INDEX: 30.27 KG/M2

## 2023-05-23 DIAGNOSIS — Z96.652 S/P TOTAL KNEE ARTHROPLASTY, LEFT: ICD-10-CM

## 2023-05-23 DIAGNOSIS — C61 PROSTATE CANCER: ICD-10-CM

## 2023-05-23 DIAGNOSIS — E78.00 PURE HYPERCHOLESTEROLEMIA: ICD-10-CM

## 2023-05-23 DIAGNOSIS — I25.10 CORONARY ARTERY DISEASE INVOLVING NATIVE CORONARY ARTERY OF NATIVE HEART WITHOUT ANGINA PECTORIS: Primary | ICD-10-CM

## 2023-05-23 DIAGNOSIS — I70.0 ATHEROSCLEROSIS OF AORTA: ICD-10-CM

## 2023-05-23 PROCEDURE — 3078F PR MOST RECENT DIASTOLIC BLOOD PRESSURE < 80 MM HG: ICD-10-PCS | Mod: HCNC,CPTII,S$GLB, | Performed by: INTERNAL MEDICINE

## 2023-05-23 PROCEDURE — 1159F MED LIST DOCD IN RCRD: CPT | Mod: HCNC,CPTII,S$GLB, | Performed by: INTERNAL MEDICINE

## 2023-05-23 PROCEDURE — 99999 PR PBB SHADOW E&M-EST. PATIENT-LVL III: ICD-10-PCS | Mod: PBBFAC,HCNC,, | Performed by: INTERNAL MEDICINE

## 2023-05-23 PROCEDURE — 3078F DIAST BP <80 MM HG: CPT | Mod: HCNC,CPTII,S$GLB, | Performed by: INTERNAL MEDICINE

## 2023-05-23 PROCEDURE — 1101F PR PT FALLS ASSESS DOC 0-1 FALLS W/OUT INJ PAST YR: ICD-10-PCS | Mod: HCNC,CPTII,S$GLB, | Performed by: INTERNAL MEDICINE

## 2023-05-23 PROCEDURE — 99499 UNLISTED E&M SERVICE: CPT | Mod: S$GLB,,, | Performed by: INTERNAL MEDICINE

## 2023-05-23 PROCEDURE — 1101F PT FALLS ASSESS-DOCD LE1/YR: CPT | Mod: HCNC,CPTII,S$GLB, | Performed by: INTERNAL MEDICINE

## 2023-05-23 PROCEDURE — 99213 PR OFFICE/OUTPT VISIT, EST, LEVL III, 20-29 MIN: ICD-10-PCS | Mod: HCNC,S$GLB,, | Performed by: INTERNAL MEDICINE

## 2023-05-23 PROCEDURE — 3075F SYST BP GE 130 - 139MM HG: CPT | Mod: HCNC,CPTII,S$GLB, | Performed by: INTERNAL MEDICINE

## 2023-05-23 PROCEDURE — 1159F PR MEDICATION LIST DOCUMENTED IN MEDICAL RECORD: ICD-10-PCS | Mod: HCNC,CPTII,S$GLB, | Performed by: INTERNAL MEDICINE

## 2023-05-23 PROCEDURE — 99213 OFFICE O/P EST LOW 20 MIN: CPT | Mod: HCNC,S$GLB,, | Performed by: INTERNAL MEDICINE

## 2023-05-23 PROCEDURE — 3288F PR FALLS RISK ASSESSMENT DOCUMENTED: ICD-10-PCS | Mod: HCNC,CPTII,S$GLB, | Performed by: INTERNAL MEDICINE

## 2023-05-23 PROCEDURE — 1126F AMNT PAIN NOTED NONE PRSNT: CPT | Mod: HCNC,CPTII,S$GLB, | Performed by: INTERNAL MEDICINE

## 2023-05-23 PROCEDURE — 3075F PR MOST RECENT SYSTOLIC BLOOD PRESS GE 130-139MM HG: ICD-10-PCS | Mod: HCNC,CPTII,S$GLB, | Performed by: INTERNAL MEDICINE

## 2023-05-23 PROCEDURE — 99499 RISK ADDL DX/OHS AUDIT: ICD-10-PCS | Mod: S$GLB,,, | Performed by: INTERNAL MEDICINE

## 2023-05-23 PROCEDURE — 1126F PR PAIN SEVERITY QUANTIFIED, NO PAIN PRESENT: ICD-10-PCS | Mod: HCNC,CPTII,S$GLB, | Performed by: INTERNAL MEDICINE

## 2023-05-23 PROCEDURE — 99999 PR PBB SHADOW E&M-EST. PATIENT-LVL III: CPT | Mod: PBBFAC,HCNC,, | Performed by: INTERNAL MEDICINE

## 2023-05-23 PROCEDURE — 99213 OFFICE O/P EST LOW 20 MIN: CPT | Mod: HCNC | Performed by: INTERNAL MEDICINE

## 2023-05-23 PROCEDURE — 3288F FALL RISK ASSESSMENT DOCD: CPT | Mod: HCNC,CPTII,S$GLB, | Performed by: INTERNAL MEDICINE

## 2023-05-23 NOTE — PROGRESS NOTES
Subjective:    Patient ID:  Herber Camarillo is a 82 y.o. male who presents for follow-up of CAD    HPI  The patient is a 82 year old male followed with CAD post NSTEMI and PCI to LAD in 2000. He is post TKR last year with a course complicated with bowel obstruction he continue to do well and has no chest pain or WILKINS. He remains active.  Lab Results   Component Value Date     05/12/2023    K 3.8 05/12/2023     05/12/2023    CO2 25 05/12/2023    BUN 13 05/12/2023    CREATININE 0.93 05/12/2023    GLU 96 05/12/2023    HGBA1C 4.9 03/31/2021    MG 1.9 07/09/2022    AST 29 05/12/2023    ALT 29 05/12/2023    ALBUMIN 3.5 05/12/2023    PROT 6.4 05/12/2023    BILITOT 0.7 05/12/2023    WBC 6.40 05/12/2023    HGB 15.9 05/12/2023    HCT 47.3 05/12/2023    HCT 42 07/03/2022    MCV 93 05/12/2023     05/12/2023    INR 1.0 06/14/2022    PSA 0.04 05/18/2023    TSH 1.987 12/16/2016         Lab Results   Component Value Date    CHOL 117 (L) 05/12/2023    HDL 39 (L) 05/12/2023    TRIG 57 05/12/2023       Lab Results   Component Value Date    LDLCALC 66.6 05/12/2023       Past Medical History:   Diagnosis Date    Arthritis     CAD (coronary artery disease) 10/30/2012    Cataract     Dermatochalasis of both upper eyelids     Fever blister     Glucose intolerance (impaired glucose tolerance) 7/24/2013    Heart attack     Hyperlipidemia 10/30/2012    Joint pain     Keloid cicatrix     Lung nodule, solitary 4/30/2015    Non morbid obesity due to excess calories 10/20/2015    Obesity     Overweight(278.02) 9/5/2013    Prostate cancer     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       Current Outpatient Medications:     acetaminophen (TYLENOL) 650 MG TbSR, Take 1 tablet (650 mg total) by mouth every 8 (eight) hours., Disp: 120 tablet, Rfl: 0    aspirin (ECOTRIN) 81 MG EC tablet, Take 1 tablet (81 mg total) by mouth once daily., Disp: 30 tablet, Rfl: 12    atorvastatin (LIPITOR) 20 MG tablet, TAKE 1 TABLET EVERY DAY,  "Disp: 90 tablet, Rfl: 3    b complex vitamins tablet, Take 1 tablet by mouth once daily., Disp: , Rfl:     metoprolol succinate (TOPROL-XL) 25 MG 24 hr tablet, Take 1 tablet (25 mg total) by mouth once daily., Disp: 90 tablet, Rfl: 3    omega-3 fatty acids-vitamin E 1,000 mg Cap, Take by mouth., Disp: , Rfl:           Review of Systems   Constitutional: Negative for decreased appetite, diaphoresis, fever, malaise/fatigue, weight gain and weight loss.   HENT:  Negative for congestion, ear discharge, ear pain and nosebleeds.    Eyes:  Negative for blurred vision, double vision and visual disturbance.   Cardiovascular:  Negative for chest pain, claudication, cyanosis, dyspnea on exertion, irregular heartbeat, leg swelling, near-syncope, orthopnea, palpitations, paroxysmal nocturnal dyspnea and syncope.   Respiratory:  Negative for cough, hemoptysis, shortness of breath, sleep disturbances due to breathing, snoring, sputum production and wheezing.    Endocrine: Negative for polydipsia, polyphagia and polyuria.   Hematologic/Lymphatic: Negative for adenopathy and bleeding problem. Does not bruise/bleed easily.   Skin:  Negative for color change, nail changes, poor wound healing and rash.   Musculoskeletal:  Negative for muscle cramps and muscle weakness.   Gastrointestinal:  Negative for abdominal pain, anorexia, change in bowel habit, hematochezia, nausea and vomiting.   Genitourinary:  Negative for dysuria, frequency and hematuria.   Neurological:  Negative for brief paralysis, difficulty with concentration, excessive daytime sleepiness, dizziness, focal weakness, headaches, light-headedness, seizures, vertigo and weakness.   Psychiatric/Behavioral:  Negative for altered mental status and depression.    Allergic/Immunologic: Negative for persistent infections.      Objective:/63   Pulse (!) 58   Ht 5' 7" (1.702 m)   Wt 87.5 kg (192 lb 14.4 oz)   BMI 30.21 kg/m²             Physical Exam  Constitutional:  "      Appearance: He is well-developed. He is obese.   HENT:      Head: Normocephalic.      Right Ear: External ear normal.      Left Ear: External ear normal.      Nose: Nose normal.   Eyes:      General: No scleral icterus.     Pupils: Pupils are equal, round, and reactive to light.   Neck:      Thyroid: No thyromegaly.      Vascular: No JVD.      Trachea: No tracheal deviation.   Cardiovascular:      Rate and Rhythm: Normal rate and regular rhythm.      Pulses: Intact distal pulses. Decreased pulses.           Carotid pulses are 2+ on the right side and 2+ on the left side.       Dorsalis pedis pulses are 2+ on the right side and 2+ on the left side.      Heart sounds: S1 normal and S2 normal. No murmur heard.    No friction rub. No gallop.   Pulmonary:      Effort: Pulmonary effort is normal.      Breath sounds: Normal breath sounds.   Abdominal:      General: Bowel sounds are normal. There is no distension.      Tenderness: There is no abdominal tenderness. There is no guarding.   Musculoskeletal:         General: No tenderness. Normal range of motion.      Cervical back: Normal range of motion and neck supple.   Lymphadenopathy:      Comments: Palpation of neck and groin lymph nodes normal   Skin:     General: Skin is dry.      Comments: No ankle nor pretibial edema   Neurological:      Mental Status: He is alert and oriented to person, place, and time.      Cranial Nerves: No cranial nerve deficit.      Motor: No abnormal muscle tone.      Coordination: Coordination normal.   Psychiatric:         Behavior: Behavior normal.         Thought Content: Thought content normal.         Judgment: Judgment normal.         Assessment:       1. Coronary artery disease involving native coronary artery of native heart without angina pectoris    2. Pure hypercholesterolemia    3. Atherosclerosis of aorta: see CT scan 7/15    4. Prostate cancer: discharged from the urology clinic in 2011.    5. S/P total knee arthroplasty,  left         Plan:       Herber was seen today for follow-up.    Diagnoses and all orders for this visit:    Coronary artery disease involving native coronary artery of native heart without angina pectoris    Pure hypercholesterolemia  -     Lipid Panel; Future; Expected date: 05/22/2024    Atherosclerosis of aorta: see CT scan 7/15  -     Comprehensive Metabolic Panel; Future; Expected date: 05/22/2024    Prostate cancer: discharged from the urology clinic in 2011.  -     CBC Auto Differential; Future; Expected date: 05/22/2024    S/P total knee arthroplasty, left

## 2023-06-20 NOTE — PROGRESS NOTES
Zoom session: Joint education provided, teaching and education individualized for patient and needs.  Physical and occupational Therapy teaching  Caregiver and home support assessed and confirmed  DME delivered, pt will bring walker to hospital.  Total knee precautions and exercises discussed and reviewed. Elevate leg above the heart, polar ice, dressing and Nimbus pump discussed.   Total hip precautions discussed and reviewed  Discussed home safety, area rugs, cords, tripping hazards.   Discussed pain management, multimodel methods and additional therapies, such as relaxation, guided imagery.   Pt will be seen by pre-op center for clearance.  Medications delivered to bedside prior to DC.   Post-op call or Virtual day after discharge.  Joint Hotline number discussed   OP PT and HH PT  discussed  Answered questions/concerns,  pt verbalized understanding, pt will call with questions concerns.

## 2023-07-07 ENCOUNTER — OFFICE VISIT (OUTPATIENT)
Dept: OTOLARYNGOLOGY | Facility: CLINIC | Age: 83
End: 2023-07-07
Payer: MEDICARE

## 2023-07-07 ENCOUNTER — CLINICAL SUPPORT (OUTPATIENT)
Dept: OTOLARYNGOLOGY | Facility: CLINIC | Age: 83
End: 2023-07-07
Payer: MEDICARE

## 2023-07-07 VITALS
HEIGHT: 67 IN | DIASTOLIC BLOOD PRESSURE: 64 MMHG | BODY MASS INDEX: 30.65 KG/M2 | WEIGHT: 195.31 LBS | HEART RATE: 54 BPM | SYSTOLIC BLOOD PRESSURE: 126 MMHG

## 2023-07-07 DIAGNOSIS — M26.622 ARTHRALGIA OF LEFT TEMPOROMANDIBULAR JOINT: ICD-10-CM

## 2023-07-07 DIAGNOSIS — R07.0 THROAT PAIN: ICD-10-CM

## 2023-07-07 DIAGNOSIS — K21.9 LARYNGOPHARYNGEAL REFLUX (LPR): ICD-10-CM

## 2023-07-07 DIAGNOSIS — H61.23 BILATERAL IMPACTED CERUMEN: Primary | ICD-10-CM

## 2023-07-07 DIAGNOSIS — J30.89 NON-SEASONAL ALLERGIC RHINITIS, UNSPECIFIED TRIGGER: ICD-10-CM

## 2023-07-07 DIAGNOSIS — H60.92 OTITIS EXTERNA OF LEFT EAR, UNSPECIFIED CHRONICITY, UNSPECIFIED TYPE: ICD-10-CM

## 2023-07-07 DIAGNOSIS — H92.02 LEFT EAR PAIN: Primary | ICD-10-CM

## 2023-07-07 PROCEDURE — 3288F PR FALLS RISK ASSESSMENT DOCUMENTED: ICD-10-PCS | Mod: HCNC,CPTII,S$GLB, | Performed by: NURSE PRACTITIONER

## 2023-07-07 PROCEDURE — 99999 PR PBB SHADOW E&M-EST. PATIENT-LVL IV: CPT | Mod: PBBFAC,HCNC,, | Performed by: NURSE PRACTITIONER

## 2023-07-07 PROCEDURE — 3074F PR MOST RECENT SYSTOLIC BLOOD PRESSURE < 130 MM HG: ICD-10-PCS | Mod: HCNC,CPTII,S$GLB, | Performed by: NURSE PRACTITIONER

## 2023-07-07 PROCEDURE — 1160F RVW MEDS BY RX/DR IN RCRD: CPT | Mod: HCNC,CPTII,S$GLB, | Performed by: NURSE PRACTITIONER

## 2023-07-07 PROCEDURE — 1125F PR PAIN SEVERITY QUANTIFIED, PAIN PRESENT: ICD-10-PCS | Mod: HCNC,CPTII,S$GLB, | Performed by: NURSE PRACTITIONER

## 2023-07-07 PROCEDURE — 3288F FALL RISK ASSESSMENT DOCD: CPT | Mod: HCNC,CPTII,S$GLB, | Performed by: NURSE PRACTITIONER

## 2023-07-07 PROCEDURE — 1160F PR REVIEW ALL MEDS BY PRESCRIBER/CLIN PHARMACIST DOCUMENTED: ICD-10-PCS | Mod: HCNC,CPTII,S$GLB, | Performed by: NURSE PRACTITIONER

## 2023-07-07 PROCEDURE — 1101F PR PT FALLS ASSESS DOC 0-1 FALLS W/OUT INJ PAST YR: ICD-10-PCS | Mod: HCNC,CPTII,S$GLB, | Performed by: NURSE PRACTITIONER

## 2023-07-07 PROCEDURE — 69210 REMOVE IMPACTED EAR WAX UNI: CPT | Mod: 51,HCNC,S$GLB, | Performed by: NURSE PRACTITIONER

## 2023-07-07 PROCEDURE — 3078F PR MOST RECENT DIASTOLIC BLOOD PRESSURE < 80 MM HG: ICD-10-PCS | Mod: HCNC,CPTII,S$GLB, | Performed by: NURSE PRACTITIONER

## 2023-07-07 PROCEDURE — 3074F SYST BP LT 130 MM HG: CPT | Mod: HCNC,CPTII,S$GLB, | Performed by: NURSE PRACTITIONER

## 2023-07-07 PROCEDURE — 31575 DIAGNOSTIC LARYNGOSCOPY: CPT | Mod: HCNC,S$GLB,, | Performed by: NURSE PRACTITIONER

## 2023-07-07 PROCEDURE — 92567 TYMPANOMETRY: CPT | Mod: HCNC,S$GLB,,

## 2023-07-07 PROCEDURE — 99999 PR PBB SHADOW E&M-EST. PATIENT-LVL IV: ICD-10-PCS | Mod: PBBFAC,HCNC,, | Performed by: NURSE PRACTITIONER

## 2023-07-07 PROCEDURE — 1159F MED LIST DOCD IN RCRD: CPT | Mod: HCNC,CPTII,S$GLB, | Performed by: NURSE PRACTITIONER

## 2023-07-07 PROCEDURE — 31575 LARYNGOSCOPY: ICD-10-PCS | Mod: HCNC,S$GLB,, | Performed by: NURSE PRACTITIONER

## 2023-07-07 PROCEDURE — 1125F AMNT PAIN NOTED PAIN PRSNT: CPT | Mod: HCNC,CPTII,S$GLB, | Performed by: NURSE PRACTITIONER

## 2023-07-07 PROCEDURE — 92567 PR TYMPA2METRY: ICD-10-PCS | Mod: HCNC,S$GLB,,

## 2023-07-07 PROCEDURE — 1101F PT FALLS ASSESS-DOCD LE1/YR: CPT | Mod: HCNC,CPTII,S$GLB, | Performed by: NURSE PRACTITIONER

## 2023-07-07 PROCEDURE — 1159F PR MEDICATION LIST DOCUMENTED IN MEDICAL RECORD: ICD-10-PCS | Mod: HCNC,CPTII,S$GLB, | Performed by: NURSE PRACTITIONER

## 2023-07-07 PROCEDURE — 3078F DIAST BP <80 MM HG: CPT | Mod: HCNC,CPTII,S$GLB, | Performed by: NURSE PRACTITIONER

## 2023-07-07 PROCEDURE — 69210 EAR CERUMEN REMOVAL: ICD-10-PCS | Mod: 51,HCNC,S$GLB, | Performed by: NURSE PRACTITIONER

## 2023-07-07 PROCEDURE — 99204 OFFICE O/P NEW MOD 45 MIN: CPT | Mod: 25,HCNC,S$GLB, | Performed by: NURSE PRACTITIONER

## 2023-07-07 PROCEDURE — 99204 PR OFFICE/OUTPT VISIT, NEW, LEVL IV, 45-59 MIN: ICD-10-PCS | Mod: 25,HCNC,S$GLB, | Performed by: NURSE PRACTITIONER

## 2023-07-07 RX ORDER — NEOMYCIN SULFATE, POLYMYXIN B SULFATE AND HYDROCORTISONE 10; 3.5; 1 MG/ML; MG/ML; [USP'U]/ML
4 SUSPENSION/ DROPS AURICULAR (OTIC) 2 TIMES DAILY
Qty: 10 ML | Refills: 0 | Status: SHIPPED | OUTPATIENT
Start: 2023-07-07 | End: 2023-07-14

## 2023-07-07 RX ORDER — LEVOCETIRIZINE DIHYDROCHLORIDE 5 MG/1
5 TABLET, FILM COATED ORAL NIGHTLY
Qty: 30 TABLET | Refills: 11 | Status: SHIPPED | OUTPATIENT
Start: 2023-07-07 | End: 2023-08-04 | Stop reason: SDUPTHER

## 2023-07-07 RX ORDER — FLUTICASONE PROPIONATE 50 MCG
2 SPRAY, SUSPENSION (ML) NASAL DAILY
Qty: 16 G | Refills: 11 | Status: SHIPPED | OUTPATIENT
Start: 2023-07-07 | End: 2023-08-04 | Stop reason: SDUPTHER

## 2023-07-07 RX ORDER — PANTOPRAZOLE SODIUM 40 MG/1
40 TABLET, DELAYED RELEASE ORAL DAILY
Qty: 30 TABLET | Refills: 11 | Status: SHIPPED | OUTPATIENT
Start: 2023-07-07 | End: 2023-08-04

## 2023-07-07 NOTE — PROCEDURES
Ear Cerumen Removal    Date/Time: 7/7/2023 11:20 AM  Performed by: Marycruz Henson NP  Authorized by: Marycruz Henson NP     Consent Done?:  Yes (Verbal)  Location details:  Both ears  Procedure type: curette    Procedure type comment:  Suction  Cerumen  Removal Results:  Cerumen completely removed  Patient tolerance:  Patient tolerated the procedure well with no immediate complications  Laryngoscopy    Date/Time: 7/7/2023 11:20 AM  Performed by: Marycruz Henson NP  Authorized by: Marycruz Henson NP     Consent Done?:  Yes (Verbal)  Anesthesia:     Local anesthetic:  Afrin and lidocaine spray  Laryngoscopy:     Areas examined:  Nasal cavities, nasopharynx, oropharynx, hypopharynx, larynx and vocal cords  Nose External:      No external nasal deformity  Nose Intranasal:      Mucosa no polyps     Mucosa ulcers not present     No mucosa lesions present     No septum gross deformity     Enlarged turbinates  Nasopharynx:      No mucosa lesions     Adenoids not present     Posterior choanae patent     Eustachian tube patent (post nasal drip)  Larynx/hypopharynx:      No epiglottis lesions     No epiglottis edema     No AE folds lesions     No vocal cord polyps     Equal and normal bilateral     No hypopharynx lesions     Piriform sinus pooling     No piriform sinus lesions     Post cricoid edema (mild)     Post cricoid erythema (mild)

## 2023-07-07 NOTE — PROGRESS NOTES
Patient ID: Herber Camarillo is a 82 y.o. y.o. male    Chief Complaint:   Chief Complaint   Patient presents with    Ear Fullness     2 weeks- feels full from the neck into the ear       Patient is referred to me by Dr. Juice Ortiz Jr. in consultation for evaluation of a possible wax impaction in bilateral ears.  he has complaints of hearing loss in the affected ears and left ear pain. Ear pain started 2 weeks ago. He went to the  and had cerumen removed.   This has been an issue in the past.  The patient has not been using any sort of ear drop to soften the wax.  He is also c/o throat pain that started 6 weeks ago. No choking, coughing or dysphagia. He admits to having acid reflux and post nasal drips. He is not on any oral antihistamines or nasal sprays.       Review of Systems   Constitutional: Negative for fever, chills, fatigue and unexpected weight change.   HENT: Positive for ear blockage. Negative for hearing loss, nosebleeds, congestion, sore throat, facial swelling, rhinorrhea, sneezing, trouble swallowing, dental problem, voice change, postnasal drip, sinus pressure, tinnitus and ear discharge.    Eyes: Negative for redness, itching and visual disturbance.   Respiratory: Negative for cough, choking and wheezing.    Cardiovascular: Negative for chest pain and palpitations.   Gastrointestinal: Negative for abdominal pain.        No reflux.   Musculoskeletal: Negative for gait problem.   Skin: Negative for rash.   Neurological: Negative for dizziness, light-headedness and headaches.     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     Past Surgical History:   Procedure Laterality Date    CARDIAC CATHETERIZATION  01/2000    CATARACT EXTRACTION W/  INTRAOCULAR LENS IMPLANT Left 9/28/2020    Procedure: EXTRACTION, CATARACT, WITH IOL INSERTION;  Surgeon: Nam Morin MD;  Location: [facility];  Service: Ophthalmology;  Laterality: Left;    CATARACT EXTRACTION W/  INTRAOCULAR LENS IMPLANT Right 10/12/2020    Procedure: EXTRACTION, CATARACT, WITH IOL INSERTION;  Surgeon: Nam Morin MD;  Location: [facility];  Service: Ophthalmology;  Laterality: Right;  laser    CHOLECYSTECTOMY      HERNIA REPAIR      HIP SURGERY Right 01/11/2017    THR    KNEE ARTHROPLASTY Left 6/20/2022    Procedure: ARTHROPLASTY, KNEE/ KAVON NEXGEN/ CEMENTED TIBIA;  Surgeon: Rodolfo Nguyen MD;  Location: [facility];  Service: Orthopedics;  Laterality: Left;    PROSTATE SURGERY      SHOULDER OPEN ROTATOR CUFF REPAIR      TONSILLECTOMY       Social History     Socioeconomic History    Marital status:    Occupational History    Occupation: Retired     Employer: RETIRED   Tobacco Use    Smoking status: Never    Smokeless tobacco: Never   Substance and Sexual Activity    Alcohol use: No     Alcohol/week: 0.0 standard drinks    Drug use: No    Sexual activity: Not Currently     Partners: Female   Social History Narrative    Originally from [place]; retired from [employer] in sales; distant cousin of former David Camarillo     Social Determinants of Health     Financial Resource Strain: Low Risk     Difficulty of Paying Living Expenses: Not hard at all   Food Insecurity: No Food Insecurity    Worried About Running Out of Food in the Last Year: Never true    Ran Out of Food in the Last Year: Never true   Transportation Needs: No Transportation Needs    Lack of Transportation (Medical): No    Lack of Transportation (Non-Medical): No   Physical Activity: Sufficiently Active    Days of Exercise per Week: 4  days    Minutes of Exercise per Session: 90 min   Stress: No Stress Concern Present    Feeling of Stress : Not at all   Social Connections: Unknown    Frequency of Communication with Friends and Family: More than three times a week    Frequency of Social Gatherings with Friends and Family: More than three times a week    Active Member of Clubs or Organizations: No    Attends Club or Organization Meetings: 1 to 4 times per year    Marital Status:    Housing Stability: Low Risk     Unable to Pay for Housing in the Last Year: No    Number of Places Lived in the Last Year: 1    Unstable Housing in the Last Year: No     Family History   Problem Relation Age of Onset    Heart disease Mother     Heart disease Father     Cancer Sister         Lung    Heart disease Brother     Heart disease Brother     Hyperlipidemia Daughter     No Known Problems Daughter     No Known Problems Son     Amblyopia Neg Hx     Blindness Neg Hx     Cataracts Neg Hx     Diabetes Neg Hx     Glaucoma Neg Hx     Hypertension Neg Hx     Macular degeneration Neg Hx     Retinal detachment Neg Hx     Strabismus Neg Hx     Stroke Neg Hx     Thyroid disease Neg Hx     Melanoma Neg Hx     Psoriasis Neg Hx     Lupus Neg Hx     Eczema Neg Hx     Acne Neg Hx     Asthma Neg Hx     Emphysema Neg Hx        Objective:      Vitals:    07/07/23 1101   BP: 126/64   Pulse: (!) 54       Constitutional: Well appearing / communicating without difficutly.  NAD.  Eyes: EOM I Bilaterally  Head/Face: Normocephalic. Negative paranasal sinus pressure/tenderness.  Salivary glands WNL.  House Brackmann I Bilaterally. + left TMJ tenderness    Right Ear: Tympanic membrane, external ear and ear canal normal.  EAC has impacted cerumen. Decreased hearing is noted.   Left Ear: Tympanic membrane, external ear and ear canal normal.  EAC has impacted cerumen, mild erythema and edema. Decreased hearing is noted.   Nose: No gross nasal septal deviation. Inferior Turbinates 3+  bilaterally. No septal perforation. No masses/lesions. External nasal skin appears normal without masses/lesions.   Oral Cavity: Gingiva/lips within normal limits.  Dentition/gingiva healthy appearing. Mucus membranes moist. Floor of mouth soft, no masses palpated. Oral Tongue mobile. Hard Palate appears normal.    Oropharynx: Base of tongue appears normal. No masses/lesions noted. Tonsillar fossa/pharyngeal wall without lesions. Posterior oropharynx WNL.  Soft palate without masses. Midline uvula.   Neck/Lymphatic: No LAD I-VI bilaterally.  No thyromegaly.  No masses noted on exam.    Mirror laryngoscopy/nasopharyngoscopy: Active gag reflex.  Unable to perform.    Neuro/Psychiatric: AOx3.  Normal mood and affect.   Cardiovascular: Normal carotid pulses bilaterally, no increasing jugular venous distention noted at cervical region bilaterally.    Respiratory: Normal respiratory effort, no stridor, no retractions noted.        Ear Cerumen Removal    Date/Time: 7/7/2023 11:20 AM  Performed by: Marycruz Henson NP  Authorized by: Marycruz Henson NP     Consent Done?:  Yes (Verbal)  Location details:  Both ears  Procedure type: curette    Procedure type comment:  Suction  Cerumen  Removal Results:  Cerumen completely removed  Patient tolerance:  Patient tolerated the procedure well with no immediate complications    Laryngoscopy    Date/Time: 7/7/2023 11:20 AM  Performed by: Marycruz Henson NP  Authorized by: Marycruz Henson NP     Consent Done?:  Yes (Verbal)  Anesthesia:     Local anesthetic:  Afrin and lidocaine spray  Laryngoscopy:     Areas examined:  Nasal cavities, nasopharynx, oropharynx, hypopharynx, larynx and vocal cords  Nose External:      No external nasal deformity  Nose Intranasal:      Mucosa no polyps     Mucosa ulcers not present     No mucosa lesions present     No septum gross deformity     Enlarged turbinates  Nasopharynx:      No mucosa lesions     Adenoids not present     Posterior choanae  patent     Eustachian tube patent (post nasal drip)  Larynx/hypopharynx:      No epiglottis lesions     No epiglottis edema     No AE folds lesions     No vocal cord polyps     Equal and normal bilateral     No hypopharynx lesions     Piriform sinus pooling     No piriform sinus lesions     Post cricoid edema (mild)     Post cricoid erythema (mild)      Diagnostic testing:  Tympanometry revealed Type A tympanograms in both ears.        Assessment:         ICD-10-CM ICD-9-CM    1. Bilateral impacted cerumen  H61.23 380.4 Ear Cerumen Removal      2. Throat pain  R07.0 784.1 Laryngoscopy      3. Otitis externa of left ear, unspecified chronicity, unspecified type  H60.92 380.10       4. Laryngopharyngeal reflux (LPR)  K21.9 478.79       5. Arthralgia of left temporomandibular joint  M26.622 524.62       6. Non-seasonal allergic rhinitis, unspecified trigger  J30.89 477.8            Plan:       - Cerumen impaction:  Removed today without difficulty.  I would recommend the use of a wax softening drop, either over the counter Debrox or mineral oil, on a weekly basis.  I also instructed the patient to avoid Qtips.    -Start on Ciprodex 4 drops in left ear bid x 7 days   -start on pantoprazole 40 mg daily  -start on Flonase 2 sprays in each nostril daily  -start on Xyzal 5 mg nightly  -f/u 4-6 weeks         Marycruz Henson NP  Answers submitted by the patient for this visit:  Review of Symptoms Questionnaire  (Submitted on 7/6/2023)  None of these: Yes  ear pain: Yes  sore throat: Yes  None of these : Yes  None of these: Yes  None of these : Yes  diarrhea: Yes  None of these: Yes  None of these : Yes  None of these: Yes  None of these : Yes  None of these: Yes  Bruises or bleeds easily: Yes  None of these: Yes

## 2023-07-07 NOTE — PROGRESS NOTES
Herber Camarillo, a 82 y.o. male was seen today in the clinic for tympanometry only.    Tympanometry revealed Type A tympanograms in both ears. ear.

## 2023-08-04 ENCOUNTER — OFFICE VISIT (OUTPATIENT)
Dept: OTOLARYNGOLOGY | Facility: CLINIC | Age: 83
End: 2023-08-04
Payer: MEDICARE

## 2023-08-04 ENCOUNTER — CLINICAL SUPPORT (OUTPATIENT)
Dept: OTOLARYNGOLOGY | Facility: CLINIC | Age: 83
End: 2023-08-04
Payer: MEDICARE

## 2023-08-04 VITALS
DIASTOLIC BLOOD PRESSURE: 81 MMHG | HEART RATE: 57 BPM | SYSTOLIC BLOOD PRESSURE: 142 MMHG | BODY MASS INDEX: 30.93 KG/M2 | HEIGHT: 67 IN | WEIGHT: 197.06 LBS

## 2023-08-04 DIAGNOSIS — K21.9 LARYNGOPHARYNGEAL REFLUX (LPR): ICD-10-CM

## 2023-08-04 DIAGNOSIS — J30.89 NON-SEASONAL ALLERGIC RHINITIS, UNSPECIFIED TRIGGER: ICD-10-CM

## 2023-08-04 DIAGNOSIS — H60.92 OTITIS EXTERNA OF LEFT EAR, UNSPECIFIED CHRONICITY, UNSPECIFIED TYPE: Primary | ICD-10-CM

## 2023-08-04 DIAGNOSIS — Z97.4 WEARS HEARING AID: ICD-10-CM

## 2023-08-04 DIAGNOSIS — H93.8X2 EAR FULLNESS, LEFT: Primary | ICD-10-CM

## 2023-08-04 PROCEDURE — 99999 PR PBB SHADOW E&M-EST. PATIENT-LVL III: CPT | Mod: PBBFAC,HCNC,, | Performed by: NURSE PRACTITIONER

## 2023-08-04 PROCEDURE — 1160F RVW MEDS BY RX/DR IN RCRD: CPT | Mod: HCNC,CPTII,S$GLB, | Performed by: NURSE PRACTITIONER

## 2023-08-04 PROCEDURE — 1101F PT FALLS ASSESS-DOCD LE1/YR: CPT | Mod: HCNC,CPTII,S$GLB, | Performed by: NURSE PRACTITIONER

## 2023-08-04 PROCEDURE — 1101F PR PT FALLS ASSESS DOC 0-1 FALLS W/OUT INJ PAST YR: ICD-10-PCS | Mod: HCNC,CPTII,S$GLB, | Performed by: NURSE PRACTITIONER

## 2023-08-04 PROCEDURE — 1126F AMNT PAIN NOTED NONE PRSNT: CPT | Mod: HCNC,CPTII,S$GLB, | Performed by: NURSE PRACTITIONER

## 2023-08-04 PROCEDURE — 99214 PR OFFICE/OUTPT VISIT, EST, LEVL IV, 30-39 MIN: ICD-10-PCS | Mod: HCNC,S$GLB,, | Performed by: NURSE PRACTITIONER

## 2023-08-04 PROCEDURE — 99999 PR PBB SHADOW E&M-EST. PATIENT-LVL III: ICD-10-PCS | Mod: PBBFAC,HCNC,, | Performed by: NURSE PRACTITIONER

## 2023-08-04 PROCEDURE — 3077F PR MOST RECENT SYSTOLIC BLOOD PRESSURE >= 140 MM HG: ICD-10-PCS | Mod: HCNC,CPTII,S$GLB, | Performed by: NURSE PRACTITIONER

## 2023-08-04 PROCEDURE — 92567 PR TYMPA2METRY: ICD-10-PCS | Mod: HCNC,S$GLB,,

## 2023-08-04 PROCEDURE — 3079F PR MOST RECENT DIASTOLIC BLOOD PRESSURE 80-89 MM HG: ICD-10-PCS | Mod: HCNC,CPTII,S$GLB, | Performed by: NURSE PRACTITIONER

## 2023-08-04 PROCEDURE — 1160F PR REVIEW ALL MEDS BY PRESCRIBER/CLIN PHARMACIST DOCUMENTED: ICD-10-PCS | Mod: HCNC,CPTII,S$GLB, | Performed by: NURSE PRACTITIONER

## 2023-08-04 PROCEDURE — 3288F FALL RISK ASSESSMENT DOCD: CPT | Mod: HCNC,CPTII,S$GLB, | Performed by: NURSE PRACTITIONER

## 2023-08-04 PROCEDURE — 3288F PR FALLS RISK ASSESSMENT DOCUMENTED: ICD-10-PCS | Mod: HCNC,CPTII,S$GLB, | Performed by: NURSE PRACTITIONER

## 2023-08-04 PROCEDURE — 1159F PR MEDICATION LIST DOCUMENTED IN MEDICAL RECORD: ICD-10-PCS | Mod: HCNC,CPTII,S$GLB, | Performed by: NURSE PRACTITIONER

## 2023-08-04 PROCEDURE — 3079F DIAST BP 80-89 MM HG: CPT | Mod: HCNC,CPTII,S$GLB, | Performed by: NURSE PRACTITIONER

## 2023-08-04 PROCEDURE — 99214 OFFICE O/P EST MOD 30 MIN: CPT | Mod: HCNC,S$GLB,, | Performed by: NURSE PRACTITIONER

## 2023-08-04 PROCEDURE — 1126F PR PAIN SEVERITY QUANTIFIED, NO PAIN PRESENT: ICD-10-PCS | Mod: HCNC,CPTII,S$GLB, | Performed by: NURSE PRACTITIONER

## 2023-08-04 PROCEDURE — 92567 TYMPANOMETRY: CPT | Mod: HCNC,S$GLB,,

## 2023-08-04 PROCEDURE — 3077F SYST BP >= 140 MM HG: CPT | Mod: HCNC,CPTII,S$GLB, | Performed by: NURSE PRACTITIONER

## 2023-08-04 PROCEDURE — 1159F MED LIST DOCD IN RCRD: CPT | Mod: HCNC,CPTII,S$GLB, | Performed by: NURSE PRACTITIONER

## 2023-08-04 RX ORDER — FLUTICASONE PROPIONATE 50 MCG
2 SPRAY, SUSPENSION (ML) NASAL DAILY
Qty: 16 G | Refills: 3 | Status: SHIPPED | OUTPATIENT
Start: 2023-08-04 | End: 2023-10-13

## 2023-08-04 RX ORDER — LEVOCETIRIZINE DIHYDROCHLORIDE 5 MG/1
5 TABLET, FILM COATED ORAL NIGHTLY
Qty: 90 TABLET | Refills: 3 | Status: SHIPPED | OUTPATIENT
Start: 2023-08-04 | End: 2024-08-03

## 2023-08-04 NOTE — PROGRESS NOTES
Patient ID: Herber Camarillo is a 83 y.o. y.o. male    Chief Complaint:   Chief Complaint   Patient presents with    Follow-up     Throat is improved   Left ear stopped up        HPI 7/7/2023: Patient is referred to me by Dr. Juice Ortiz Jr. in consultation for evaluation of a possible wax impaction in bilateral ears.  he has complaints of hearing loss in the affected ears and left ear pain. Ear pain started 2 weeks ago. He went to the  and had cerumen removed.   This has been an issue in the past.  The patient has not been using any sort of ear drop to soften the wax.  He is also c/o throat pain that started 6 weeks ago. No choking, coughing or dysphagia. He admits to having acid reflux and post nasal drips. He is not on any oral antihistamines or nasal sprays.     Interval HPI 8/4/2023:  Follow up visit. He completed Ciprodex for otitis externa of the left ear. At the last visit, he was started on pantoprazole 40 mg daily for LPR and Flonase and Xyzal for allergic rhinitis. He reports sinus symptoms have improved with the Flonase and Xyzal. No longer has throat pain.He still has fullness sensation in the left ear. He has a hx of hearing loss and wears hearing aids. He had a hearing test a couple months.       Review of Systems   Constitutional: Negative for fever, chills, fatigue and unexpected weight change.   HENT: Positive for ear blockage. Negative for hearing loss, nosebleeds, congestion, sore throat, facial swelling, rhinorrhea, sneezing, trouble swallowing, dental problem, voice change, postnasal drip, sinus pressure, tinnitus and ear discharge.    Eyes: Negative for redness, itching and visual disturbance.   Respiratory: Negative for cough, choking and wheezing.    Cardiovascular: Negative for chest pain and palpitations.   Gastrointestinal: Negative for abdominal pain.        No reflux.   Musculoskeletal: Negative for gait problem.   Skin: Negative for rash.   Neurological: Negative for dizziness,  light-headedness and headaches.     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     Past Surgical History:   Procedure Laterality Date    CARDIAC CATHETERIZATION  01/2000    CATARACT EXTRACTION W/  INTRAOCULAR LENS IMPLANT Left 9/28/2020    Procedure: EXTRACTION, CATARACT, WITH IOL INSERTION;  Surgeon: Nam Morin MD;  Location: [facility];  Service: Ophthalmology;  Laterality: Left;    CATARACT EXTRACTION W/  INTRAOCULAR LENS IMPLANT Right 10/12/2020    Procedure: EXTRACTION, CATARACT, WITH IOL INSERTION;  Surgeon: Nam Morin MD;  Location: [facility];  Service: Ophthalmology;  Laterality: Right;  laser    CHOLECYSTECTOMY      HERNIA REPAIR      HIP SURGERY Right 01/11/2017    THR    KNEE ARTHROPLASTY Left 6/20/2022    Procedure: ARTHROPLASTY, KNEE/ KAVON NEXGEN/ CEMENTED TIBIA;  Surgeon: Rodolfo Nguyen MD;  Location: [facility];  Service: Orthopedics;  Laterality: Left;    PROSTATE SURGERY      SHOULDER OPEN ROTATOR CUFF REPAIR      TONSILLECTOMY       Social History     Socioeconomic History    Marital status:    Occupational History    Occupation: Retired     Employer: RETIRED   Tobacco Use    Smoking status: Never    Smokeless tobacco: Never   Substance and Sexual Activity    Alcohol use: No     Alcohol/week: 0.0 standard drinks of alcohol    Drug use: No    Sexual activity: Not Currently     Partners: Female   Social History Narrative    Originally from [place]; retired from [employer] in [employer]; distant cousin of former David Covarrubias  Determinants of Health     Financial Resource Strain: Low Risk  (5/18/2023)    Overall Financial Resource Strain (CARDIA)     Difficulty of Paying Living Expenses: Not hard at all   Food Insecurity: No Food Insecurity (5/18/2023)    Hunger Vital Sign     Worried About Running Out of Food in the Last Year: Never true     Ran Out of Food in the Last Year: Never true   Transportation Needs: No Transportation Needs (5/18/2023)    PRAPARE - Transportation     Lack of Transportation (Medical): No     Lack of Transportation (Non-Medical): No   Physical Activity: Sufficiently Active (5/18/2023)    Exercise Vital Sign     Days of Exercise per Week: 4 days     Minutes of Exercise per Session: 90 min   Stress: No Stress Concern Present (5/18/2023)    [place] [employer] - Occupational Stress Questionnaire     Feeling of Stress : Not at all   Social Connections: Unknown (5/18/2023)    Social Connection and Isolation Panel [NHANES]     Frequency of Communication with Friends and Family: More than three times a week     Frequency of Social Gatherings with Friends and Family: More than three times a week     Active Member of Clubs or Organizations: No     Attends Club or Organization Meetings: 1 to 4 times per year     Marital Status:    Housing Stability: Low Risk  (5/18/2023)    Housing Stability Vital Sign     Unable to Pay for Housing in the Last Year: No     Number of Places Lived in the Last Year: 1     Unstable Housing in the Last Year: No     Family History   Problem Relation Age of Onset    Heart disease Mother     Heart disease Father     Cancer Sister         Lung    Heart disease Brother     Heart disease Brother     Hyperlipidemia Daughter     No Known Problems Daughter     No Known Problems Son     Amblyopia Neg Hx     Blindness Neg Hx     Cataracts Neg Hx     Diabetes Neg Hx     Glaucoma Neg Hx     Hypertension Neg Hx     Macular degeneration Neg Hx     Retinal detachment Neg Hx      Strabismus Neg Hx     Stroke Neg Hx     Thyroid disease Neg Hx     Melanoma Neg Hx     Psoriasis Neg Hx     Lupus Neg Hx     Eczema Neg Hx     Acne Neg Hx     Asthma Neg Hx     Emphysema Neg Hx        Objective:      Vitals:    08/04/23 1401   BP: (!) 142/81   Pulse: (!) 57         Constitutional: Well appearing / communicating without difficutly.  NAD.  Eyes: EOM I Bilaterally  Head/Face: Normocephalic. Negative paranasal sinus pressure/tenderness.  Salivary glands WNL.  House Brackmann I Bilaterally.    Right Ear: Auricle normal appearance. External Auditory Canal within normal limits no lesions or masses,TM w/o masses/lesions/perforations. TM mobility noted.   Left Ear: Auricle normal appearance. External Auditory Canal within normal limits no lesions or masses,TM w/o masses/lesions/perforations. TM mobility noted.  Nose: No gross nasal septal deviation. Inferior Turbinates 3+ bilaterally. No septal perforation. No masses/lesions. External nasal skin appears normal without masses/lesions.   Oral Cavity: Gingiva/lips within normal limits.  Dentition/gingiva healthy appearing. Mucus membranes moist. Floor of mouth soft, no masses palpated. Oral Tongue mobile. Hard Palate appears normal.    Oropharynx: Base of tongue appears normal. No masses/lesions noted. Tonsillar fossa/pharyngeal wall without lesions. Posterior oropharynx WNL.  Soft palate without masses. Midline uvula.   Neck/Lymphatic: No LAD I-VI bilaterally.  No thyromegaly.  No masses noted on exam.    Mirror laryngoscopy/nasopharyngoscopy: Active gag reflex.  Unable to perform.    Neuro/Psychiatric: AOx3.  Normal mood and affect.   Cardiovascular: Normal carotid pulses bilaterally, no increasing jugular venous distention noted at cervical region bilaterally.    Respiratory: Normal respiratory effort, no stridor, no retractions noted.        Diagnostic testing:  Tympanometry revealed Type A tympanograms, bilaterally.           Assessment:         ICD-10-CM  ICD-9-CM    1. Otitis externa of left ear, unspecified chronicity, unspecified type -resolved  H60.92 380.10       2. Non-seasonal allergic rhinitis, unspecified trigger  J30.89 477.8       3. Laryngopharyngeal reflux (LPR)  K21.9 478.79       4. Wears hearing aid  Z97.4 V45.89              Plan:      -Tympanometry revealed Type A tympanograms, bilaterally.   -bilateral normal appearance of the EAC and TMs. No cerumen impaction.    -can stop pantoprazole   -continue on Flonase 2 sprays in each nostril daily  -continue on Xyzal 5 mg nightly  -f/u 6 months        Marycruz Henson NP      Answers submitted by the patient for this visit:  Review of Symptoms Questionnaire  (Submitted on 7/30/2023)  None of these: Yes  ear pain: Yes  sore throat: Yes  Eye Drainage?: Yes  None of these: Yes  None of these : Yes  None of these: Yes  None of these: Yes  None of these : Yes  None of these: Yes  None of these : Yes  None of these: Yes  None of these: Yes  None of these: Yes

## 2023-08-04 NOTE — PROGRESS NOTES
[place] TERE Camarillo, a 83 y.o. male was seen today in the clinic for tympanometry only due to patient complaint of ear fullness of left ear.    Tympanometry revealed Type A tympanograms, bilaterally.

## 2023-08-23 ENCOUNTER — PES CALL (OUTPATIENT)
Dept: ADMINISTRATIVE | Facility: CLINIC | Age: 83
End: 2023-08-23
Payer: MEDICARE

## 2023-09-06 ENCOUNTER — TELEPHONE (OUTPATIENT)
Dept: ADMINISTRATIVE | Facility: CLINIC | Age: 83
End: 2023-09-06
Payer: MEDICARE

## 2023-09-07 ENCOUNTER — OFFICE VISIT (OUTPATIENT)
Dept: FAMILY MEDICINE | Facility: CLINIC | Age: 83
End: 2023-09-07
Payer: MEDICARE

## 2023-09-07 VITALS
SYSTOLIC BLOOD PRESSURE: 136 MMHG | HEART RATE: 63 BPM | HEIGHT: 67 IN | WEIGHT: 194 LBS | BODY MASS INDEX: 30.45 KG/M2 | TEMPERATURE: 98 F | OXYGEN SATURATION: 97 % | DIASTOLIC BLOOD PRESSURE: 78 MMHG

## 2023-09-07 DIAGNOSIS — E78.00 PURE HYPERCHOLESTEROLEMIA: ICD-10-CM

## 2023-09-07 DIAGNOSIS — Z00.00 ENCOUNTER FOR PREVENTIVE HEALTH EXAMINATION: Primary | ICD-10-CM

## 2023-09-07 DIAGNOSIS — I25.10 CORONARY ARTERY DISEASE INVOLVING NATIVE CORONARY ARTERY OF NATIVE HEART WITHOUT ANGINA PECTORIS: ICD-10-CM

## 2023-09-07 DIAGNOSIS — I70.0 ATHEROSCLEROSIS OF AORTA: ICD-10-CM

## 2023-09-07 DIAGNOSIS — M17.11 PRIMARY OSTEOARTHRITIS OF RIGHT KNEE: ICD-10-CM

## 2023-09-07 DIAGNOSIS — C61 PROSTATE CANCER: ICD-10-CM

## 2023-09-07 DIAGNOSIS — Z87.442 HISTORY OF KIDNEY STONES: ICD-10-CM

## 2023-09-07 DIAGNOSIS — E79.89 URIC ACID CRYSTALLOPATHY: ICD-10-CM

## 2023-09-07 DIAGNOSIS — Z96.652 S/P TOTAL KNEE ARTHROPLASTY, LEFT: ICD-10-CM

## 2023-09-07 DIAGNOSIS — E87.6 HYPOKALEMIA: ICD-10-CM

## 2023-09-07 DIAGNOSIS — R91.1 LUNG NODULE, SOLITARY: ICD-10-CM

## 2023-09-07 PROCEDURE — 1125F AMNT PAIN NOTED PAIN PRSNT: CPT | Mod: HCNC,CPTII,S$GLB, | Performed by: PEDIATRICS

## 2023-09-07 PROCEDURE — 1101F PT FALLS ASSESS-DOCD LE1/YR: CPT | Mod: HCNC,CPTII,S$GLB, | Performed by: PEDIATRICS

## 2023-09-07 PROCEDURE — 1159F PR MEDICATION LIST DOCUMENTED IN MEDICAL RECORD: ICD-10-PCS | Mod: HCNC,CPTII,S$GLB, | Performed by: PEDIATRICS

## 2023-09-07 PROCEDURE — 1159F MED LIST DOCD IN RCRD: CPT | Mod: HCNC,CPTII,S$GLB, | Performed by: PEDIATRICS

## 2023-09-07 PROCEDURE — 1170F PR FUNCTIONAL STATUS ASSESSED: ICD-10-PCS | Mod: HCNC,CPTII,S$GLB, | Performed by: PEDIATRICS

## 2023-09-07 PROCEDURE — 99999 PR PBB SHADOW E&M-EST. PATIENT-LVL IV: CPT | Mod: PBBFAC,HCNC,, | Performed by: PEDIATRICS

## 2023-09-07 PROCEDURE — G0439 PR MEDICARE ANNUAL WELLNESS SUBSEQUENT VISIT: ICD-10-PCS | Mod: HCNC,S$GLB,, | Performed by: PEDIATRICS

## 2023-09-07 PROCEDURE — 3075F SYST BP GE 130 - 139MM HG: CPT | Mod: HCNC,CPTII,S$GLB, | Performed by: PEDIATRICS

## 2023-09-07 PROCEDURE — 3078F DIAST BP <80 MM HG: CPT | Mod: HCNC,CPTII,S$GLB, | Performed by: PEDIATRICS

## 2023-09-07 PROCEDURE — 3288F FALL RISK ASSESSMENT DOCD: CPT | Mod: HCNC,CPTII,S$GLB, | Performed by: PEDIATRICS

## 2023-09-07 PROCEDURE — 1101F PR PT FALLS ASSESS DOC 0-1 FALLS W/OUT INJ PAST YR: ICD-10-PCS | Mod: HCNC,CPTII,S$GLB, | Performed by: PEDIATRICS

## 2023-09-07 PROCEDURE — 1125F PR PAIN SEVERITY QUANTIFIED, PAIN PRESENT: ICD-10-PCS | Mod: HCNC,CPTII,S$GLB, | Performed by: PEDIATRICS

## 2023-09-07 PROCEDURE — 1160F PR REVIEW ALL MEDS BY PRESCRIBER/CLIN PHARMACIST DOCUMENTED: ICD-10-PCS | Mod: HCNC,CPTII,S$GLB, | Performed by: PEDIATRICS

## 2023-09-07 PROCEDURE — 3075F PR MOST RECENT SYSTOLIC BLOOD PRESS GE 130-139MM HG: ICD-10-PCS | Mod: HCNC,CPTII,S$GLB, | Performed by: PEDIATRICS

## 2023-09-07 PROCEDURE — 1160F RVW MEDS BY RX/DR IN RCRD: CPT | Mod: HCNC,CPTII,S$GLB, | Performed by: PEDIATRICS

## 2023-09-07 PROCEDURE — 3288F PR FALLS RISK ASSESSMENT DOCUMENTED: ICD-10-PCS | Mod: HCNC,CPTII,S$GLB, | Performed by: PEDIATRICS

## 2023-09-07 PROCEDURE — 99999 PR PBB SHADOW E&M-EST. PATIENT-LVL IV: ICD-10-PCS | Mod: PBBFAC,HCNC,, | Performed by: PEDIATRICS

## 2023-09-07 PROCEDURE — G0439 PPPS, SUBSEQ VISIT: HCPCS | Mod: HCNC,S$GLB,, | Performed by: PEDIATRICS

## 2023-09-07 PROCEDURE — 3078F PR MOST RECENT DIASTOLIC BLOOD PRESSURE < 80 MM HG: ICD-10-PCS | Mod: HCNC,CPTII,S$GLB, | Performed by: PEDIATRICS

## 2023-09-07 PROCEDURE — 1170F FXNL STATUS ASSESSED: CPT | Mod: HCNC,CPTII,S$GLB, | Performed by: PEDIATRICS

## 2023-09-07 RX ORDER — HYDROCORTISONE 1 %
GEL (GRAM) TOPICAL
COMMUNITY
Start: 2023-08-21

## 2023-09-07 NOTE — PROGRESS NOTES
"  Herber Camarillo presented for a  Medicare AWV and comprehensive Health Risk Assessment today. The following components were reviewed and updated:    Medical history  Family History  Social history  Allergies and Current Medications  Health Risk Assessment  Health Maintenance  Care Team         ** See Completed Assessments for Annual Wellness Visit within the encounter summary.**         The following assessments were completed:  Living Situation  CAGE  Depression Screening  Timed Get Up and Go  Whisper Test  Cognitive Function Screening  Nutrition Screening  ADL Screening  PAQ Screening    Patient does not have Rx for Opioids  Patient does not use substance     Vitals:    09/07/23 1311   BP: 136/78   Pulse: 63   Temp: 97.9 °F (36.6 °C)   TempSrc: Temporal   SpO2: 97%   Weight: 88 kg (194 lb 0.1 oz)   Height: 5' 7" (1.702 m)     Body mass index is 30.39 kg/m².  Physical Exam  Constitutional:       General: He is not in acute distress.     Appearance: Normal appearance.   HENT:      Head: Normocephalic and atraumatic.      Right Ear: External ear normal.      Left Ear: External ear normal.      Nose: Nose normal.      Mouth/Throat:      Mouth: Mucous membranes are moist.      Pharynx: Oropharynx is clear.   Eyes:      Extraocular Movements: Extraocular movements intact.      Conjunctiva/sclera: Conjunctivae normal.      Pupils: Pupils are equal, round, and reactive to light.   Cardiovascular:      Rate and Rhythm: Normal rate and regular rhythm.      Pulses: Normal pulses.      Heart sounds: Normal heart sounds.   Pulmonary:      Effort: Pulmonary effort is normal. No respiratory distress.      Breath sounds: Normal breath sounds. No wheezing.   Abdominal:      General: Abdomen is flat.   Musculoskeletal:         General: No swelling. Normal range of motion.      Cervical back: Normal range of motion and neck supple.   Skin:     General: Skin is warm and dry.      Findings: No rash.   Neurological:      Mental Status: He " is alert and oriented to person, place, and time.      Gait: Gait normal.   Psychiatric:         Mood and Affect: Mood normal.         Behavior: Behavior normal.               Diagnoses and health risks identified today and associated recommendations/orders:    1. Encounter for preventive health examination  Chart reviewed. Problem list updated. Discussed current medical diagnosis, current medications, medical/surgical/family/social history; updated provider list; documented vital signs; identified any cognitive impairment; and updated risk factor list. Addressed any outstanding health maintenance. Provided patient with personalized health advice. Continue to follow up with PCP and any specialists.       2. Atherosclerosis of aorta: see CT scan 7/15  Chronic; stable on current treatment plan; follow up as scheduled.  Managed by cardiology  Aspirin, metoprolol    3. Prostate cancer: discharged from the urology clinic in 2011.  Chronic; stable on current treatment plan; follow up as scheduled.  Managed by dr hernandez, has not had problems in several years    4. 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  Managed by dr hernandez, has not had problems in several years    5. Coronary artery disease involving native coronary artery of native heart without angina pectoris  Chronic; stable on current treatment plan; follow up as scheduled.  Managed by cardiology  Aspirin, metoprolol    6. Lung nodule, solitary .7 cm 7/15; stable 12/16, also 2/19  Chronic; stable on current treatment plan; follow up as scheduled.    7. Pure hypercholesterolemia  Chronic; stable on current treatment plan; follow up as scheduled.  Lipitor  Managed by pcp    8. Hypokalemia  Chronic; stable on current treatment plan; follow up as scheduled.    9. History of kidney stones: non obstructing 10/21  Chronic; stable on current treatment plan; follow up as scheduled.    10. Primary osteoarthritis of right  knee  Chronic; stable on current treatment plan; follow up as scheduled.  Managed by andino, ortho    11. S/P total knee arthroplasty, left  Chronic; stable on current treatment plan; follow up as scheduled.  Managed by vik andino    Provided Herber with a 5-10 year written screening schedule and personal prevention plan. Recommendations were developed using the USPSTF age appropriate recommendations. Education, counseling, and referrals were provided as needed. After Visit Summary printed and given to patient which includes a list of additional screenings\tests needed.    Follow up in about 1 year (around 9/7/2024).      Javier Farmer NP   [facility]  9/7/23         I offered to discuss advanced care planning, including how to pick a person who would make decisions for you if you were unable to make them for yourself, called a health care power of , and what kind of decisions you might make such as use of life sustaining treatments such as ventilators and tube feeding when faced with a life limiting illness recorded on a living will that they will need to know. (How you want to be cared for as you near the end of your natural life)     X Patient is interested in learning more about how to make advanced directives.  I provided them paperwork and offered to discuss this with them.

## 2023-09-07 NOTE — PATIENT INSTRUCTIONS
Counseling and Referral of Other Preventative  (Italic type indicates deductible and co-insurance are waived)    Patient Name: Herber Camarillo  Today's Date: 9/7/2023    Health Maintenance       Date Due Completion Date    Influenza Vaccine (1) 09/08/2023 (Originally 9/1/2023) 9/27/2022    COVID-19 Vaccine (4 - Moderna series) 05/18/2024 (Originally 3/8/2022) 1/11/2022    Lipid Panel 05/12/2024 5/12/2023    PROSTATE-SPECIFIC ANTIGEN 05/18/2024 5/18/2023    Aspirin/Antiplatelet Therapy 08/04/2024 8/4/2023    TETANUS VACCINE 10/17/2029 10/17/2019        No orders of the defined types were placed in this encounter.      The following information is provided to all patients.  This information is to help you find resources for any of the problems found today that may be affecting your health:                Living healthy guide: www.[facility].[place].[facility]      Understanding Diabetes: www.diabetes.org      Eating healthy: www.cdc.gov/healthyweight      CDC home safety checklist: www.cdc.gov/steadi/patient.html      Agency on Aging: www.goea.[place].[facility]      Alcoholics anonymous (AA): www.aa.org      Physical Activity: www.ira.nih.gov/qk7ecmt      Tobacco use: www.quitwithusla.org

## 2023-09-19 ENCOUNTER — OFFICE VISIT (OUTPATIENT)
Dept: DERMATOLOGY | Facility: CLINIC | Age: 83
End: 2023-09-19
Payer: MEDICARE

## 2023-09-19 DIAGNOSIS — L28.1 PRURIGO NODULARIS: ICD-10-CM

## 2023-09-19 DIAGNOSIS — L72.0 EPIDERMAL INCLUSION CYST: ICD-10-CM

## 2023-09-19 DIAGNOSIS — L57.0 ACTINIC KERATOSIS: ICD-10-CM

## 2023-09-19 DIAGNOSIS — D18.01 CHERRY ANGIOMA: ICD-10-CM

## 2023-09-19 DIAGNOSIS — Z12.83 SCREENING EXAM FOR SKIN CANCER: ICD-10-CM

## 2023-09-19 DIAGNOSIS — L82.1 SEBORRHEIC KERATOSES: ICD-10-CM

## 2023-09-19 DIAGNOSIS — D22.9 MULTIPLE BENIGN NEVI: ICD-10-CM

## 2023-09-19 DIAGNOSIS — L81.4 LENTIGO: ICD-10-CM

## 2023-09-19 DIAGNOSIS — D48.5 NEOPLASM OF UNCERTAIN BEHAVIOR OF SKIN: Primary | ICD-10-CM

## 2023-09-19 PROCEDURE — 1126F AMNT PAIN NOTED NONE PRSNT: CPT | Mod: HCNC,CPTII,S$GLB, | Performed by: STUDENT IN AN ORGANIZED HEALTH CARE EDUCATION/TRAINING PROGRAM

## 2023-09-19 PROCEDURE — 17000 DESTRUCT PREMALG LESION: CPT | Mod: HCNC,XS,S$GLB, | Performed by: STUDENT IN AN ORGANIZED HEALTH CARE EDUCATION/TRAINING PROGRAM

## 2023-09-19 PROCEDURE — 88305 TISSUE EXAM BY PATHOLOGIST: CPT | Mod: HCNC | Performed by: STUDENT IN AN ORGANIZED HEALTH CARE EDUCATION/TRAINING PROGRAM

## 2023-09-19 PROCEDURE — 11900 PR INJECTION INTO SKIN LESIONS, UP TO 7: ICD-10-PCS | Mod: HCNC,XS,S$GLB, | Performed by: STUDENT IN AN ORGANIZED HEALTH CARE EDUCATION/TRAINING PROGRAM

## 2023-09-19 PROCEDURE — 99213 OFFICE O/P EST LOW 20 MIN: CPT | Mod: 25,HCNC,S$GLB, | Performed by: STUDENT IN AN ORGANIZED HEALTH CARE EDUCATION/TRAINING PROGRAM

## 2023-09-19 PROCEDURE — 1160F PR REVIEW ALL MEDS BY PRESCRIBER/CLIN PHARMACIST DOCUMENTED: ICD-10-PCS | Mod: HCNC,CPTII,S$GLB, | Performed by: STUDENT IN AN ORGANIZED HEALTH CARE EDUCATION/TRAINING PROGRAM

## 2023-09-19 PROCEDURE — 1159F PR MEDICATION LIST DOCUMENTED IN MEDICAL RECORD: ICD-10-PCS | Mod: HCNC,CPTII,S$GLB, | Performed by: STUDENT IN AN ORGANIZED HEALTH CARE EDUCATION/TRAINING PROGRAM

## 2023-09-19 PROCEDURE — 1126F PR PAIN SEVERITY QUANTIFIED, NO PAIN PRESENT: ICD-10-PCS | Mod: HCNC,CPTII,S$GLB, | Performed by: STUDENT IN AN ORGANIZED HEALTH CARE EDUCATION/TRAINING PROGRAM

## 2023-09-19 PROCEDURE — 1160F RVW MEDS BY RX/DR IN RCRD: CPT | Mod: HCNC,CPTII,S$GLB, | Performed by: STUDENT IN AN ORGANIZED HEALTH CARE EDUCATION/TRAINING PROGRAM

## 2023-09-19 PROCEDURE — 88305 TISSUE EXAM BY PATHOLOGIST: CPT | Mod: 26,HCNC,, | Performed by: STUDENT IN AN ORGANIZED HEALTH CARE EDUCATION/TRAINING PROGRAM

## 2023-09-19 PROCEDURE — 99213 PR OFFICE/OUTPT VISIT, EST, LEVL III, 20-29 MIN: ICD-10-PCS | Mod: 25,HCNC,S$GLB, | Performed by: STUDENT IN AN ORGANIZED HEALTH CARE EDUCATION/TRAINING PROGRAM

## 2023-09-19 PROCEDURE — 99999 PR PBB SHADOW E&M-EST. PATIENT-LVL III: ICD-10-PCS | Mod: PBBFAC,HCNC,, | Performed by: STUDENT IN AN ORGANIZED HEALTH CARE EDUCATION/TRAINING PROGRAM

## 2023-09-19 PROCEDURE — 11102 PR TANGENTIAL BIOPSY, SKIN, SINGLE LESION: ICD-10-PCS | Mod: HCNC,S$GLB,, | Performed by: STUDENT IN AN ORGANIZED HEALTH CARE EDUCATION/TRAINING PROGRAM

## 2023-09-19 PROCEDURE — 88305 TISSUE EXAM BY PATHOLOGIST: ICD-10-PCS | Mod: 26,HCNC,, | Performed by: STUDENT IN AN ORGANIZED HEALTH CARE EDUCATION/TRAINING PROGRAM

## 2023-09-19 PROCEDURE — 3288F FALL RISK ASSESSMENT DOCD: CPT | Mod: HCNC,CPTII,S$GLB, | Performed by: STUDENT IN AN ORGANIZED HEALTH CARE EDUCATION/TRAINING PROGRAM

## 2023-09-19 PROCEDURE — 17000 PR DESTRUCTION(LASER SURGERY,CRYOSURGERY,CHEMOSURGERY),PREMALIGNANT LESIONS,FIRST LESION: ICD-10-PCS | Mod: HCNC,XS,S$GLB, | Performed by: STUDENT IN AN ORGANIZED HEALTH CARE EDUCATION/TRAINING PROGRAM

## 2023-09-19 PROCEDURE — 1101F PT FALLS ASSESS-DOCD LE1/YR: CPT | Mod: HCNC,CPTII,S$GLB, | Performed by: STUDENT IN AN ORGANIZED HEALTH CARE EDUCATION/TRAINING PROGRAM

## 2023-09-19 PROCEDURE — 11900 INJECT SKIN LESIONS </W 7: CPT | Mod: HCNC,XS,S$GLB, | Performed by: STUDENT IN AN ORGANIZED HEALTH CARE EDUCATION/TRAINING PROGRAM

## 2023-09-19 PROCEDURE — 99999 PR PBB SHADOW E&M-EST. PATIENT-LVL III: CPT | Mod: PBBFAC,HCNC,, | Performed by: STUDENT IN AN ORGANIZED HEALTH CARE EDUCATION/TRAINING PROGRAM

## 2023-09-19 PROCEDURE — 1101F PR PT FALLS ASSESS DOC 0-1 FALLS W/OUT INJ PAST YR: ICD-10-PCS | Mod: HCNC,CPTII,S$GLB, | Performed by: STUDENT IN AN ORGANIZED HEALTH CARE EDUCATION/TRAINING PROGRAM

## 2023-09-19 PROCEDURE — 1159F MED LIST DOCD IN RCRD: CPT | Mod: HCNC,CPTII,S$GLB, | Performed by: STUDENT IN AN ORGANIZED HEALTH CARE EDUCATION/TRAINING PROGRAM

## 2023-09-19 PROCEDURE — 11102 TANGNTL BX SKIN SINGLE LES: CPT | Mod: HCNC,S$GLB,, | Performed by: STUDENT IN AN ORGANIZED HEALTH CARE EDUCATION/TRAINING PROGRAM

## 2023-09-19 PROCEDURE — 3288F PR FALLS RISK ASSESSMENT DOCUMENTED: ICD-10-PCS | Mod: HCNC,CPTII,S$GLB, | Performed by: STUDENT IN AN ORGANIZED HEALTH CARE EDUCATION/TRAINING PROGRAM

## 2023-09-19 NOTE — PATIENT INSTRUCTIONS
Shave Biopsy Wound Care    Your doctor has performed a shave biopsy today.  A band aid and vaseline ointment has been placed over the site.  This should remain in place for NO LONGER THAN 48 hours.  It is fine to remove the bandaid after 24 hours, if the area is no longer bleeding. It is recommended that you keep the area dry (do not wet)) for the first 24 hours.  After 24 hours, wash the area with warm soap and water and apply Vaseline jelly.  Many patients prefer to use Neosporin or Bacitracin ointment.  This is acceptable; however, know that you can develop an allergy to this medication even if you have used it safely for years.  It is important to keep the area moist.  Letting it dry out and get air slows healing time, and will worsen the scar.        If you notice increasing redness, tenderness, pain, or yellow drainage at the biopsy site, please notify your doctor.  These are signs of an infection.    If your biopsy site is bleeding, apply firm pressure for 15 minutes straight.  Repeat for another 15 minutes, if it is still bleeding.   If the surgical site continues to bleed, then please contact your doctor.      For [facility] users:   You will receive your biopsy results in [facility] as soon as they are available. Please be assured that your physician/provider will review your results and will then determine what further treatment, evaluation, or planning is required. You should be contacted by your physician's/provider's office within 5 business days of receiving your results; If not, please reach out to directly. This is one more way [employer] is putting you first.     [facility] [place] [address]/ [phone] FAX/ [email or web address]

## 2023-09-19 NOTE — PROGRESS NOTES
Subjective:      Patient ID:  Herber Camarillo is a 83 y.o. male who presents for   Chief Complaint   Patient presents with    Skin Check     tbse     Pt is here today for TBSE.    Patient with new complaint of lesion(s)  Location: right scalp  Duration: several months  Symptoms: none  Relieving factors/Previous treatments: none    Patient with new complaint of lesion(s)  Location: left cheek  Duration: several months  Symptoms: itching  Relieving factors/Previous treatments: none              Review of Systems   Constitutional:  Negative for fever and chills.   Respiratory:  Negative for cough and shortness of breath.    Gastrointestinal:  Negative for nausea and vomiting.   Musculoskeletal:  Negative for joint swelling and arthralgias.   Skin:  Negative for daily sunscreen use, activity-related sunscreen use and wears hat.   Hematologic/Lymphatic: Bruises/bleeds easily.       Objective:   Physical Exam   Constitutional: He appears well-developed and well-nourished. No distress.   Neurological: He is alert and oriented to person, place, and time. He is not disoriented.   Psychiatric: He has a normal mood and affect.   Skin:   Areas Examined (abnormalities noted in diagram):   Head / Face Inspection Performed  Neck Inspection Performed  Chest / Axilla Inspection Performed  Abdomen Inspection Performed  Back Inspection Performed  RUE Inspected  LUE Inspection Performed                 Diagram Legend     Erythematous scaling macule/papule c/w actinic keratosis       Vascular papule c/w angioma      Pigmented verrucoid papule/plaque c/w seborrheic keratosis      Yellow umbilicated papule c/w sebaceous hyperplasia      Irregularly shaped tan macule c/w lentigo     1-2 mm smooth white papules consistent with Milia      Movable subcutaneous cyst with punctum c/w epidermal inclusion cyst      Subcutaneous movable cyst c/w pilar cyst      Firm pink to brown papule c/w dermatofibroma      Pedunculated fleshy papule(s) c/w skin  tag(s)      Evenly pigmented macule c/w junctional nevus     Mildly variegated pigmented, slightly irregular-bordered macule c/w mildly atypical nevus      Flesh colored to evenly pigmented papule c/w intradermal nevus       Pink pearly papule/plaque c/w basal cell carcinoma      Erythematous hyperkeratotic cursted plaque c/w SCC      Surgical scar with no sign of skin cancer recurrence      Open and closed comedones      Inflammatory papules and pustules      Verrucoid papule consistent consistent with wart     Erythematous eczematous patches and plaques     Dystrophic onycholytic nail with subungual debris c/w onychomycosis     Umbilicated papule    Erythematous-base heme-crusted tan verrucoid plaque consistent with inflamed seborrheic keratosis     Erythematous Silvery Scaling Plaque c/w Psoriasis     See annotation        Assessment / Plan:      Pathology Orders:       Normal Orders This Visit    Specimen to Pathology, Dermatology     Questions:    Procedure Type: Dermatology and skin neoplasms    Number of Specimens: 1    ------------------------: -------------------------    Spec 1 Procedure: Biopsy    Spec 1 Clinical Impression: idn vs neurofibroma vs tag vs other    Spec 1 Source: right parietal scalp    Release to patient: Immediate          Neoplasm of uncertain behavior of skin  -     Specimen to Pathology, Dermatology    Shave biopsy procedure note:  Shave biopsy performed after verbal consent including risk of infection, scar, recurrence, need for additional treatment of site. Area prepped with alcohol, anesthetized with approximately 1.0cc of 1% lidocaine with epinephrine. Lesional tissue shaved with razor blade. Hemostasis achieved with application of aluminum chloride followed by hyfrecation. No complications. Dressing applied. Wound care explained.    Actinic keratosis  Cryosurgery Procedure Note    Verbal consent from the patient is obtained including, but not limited to, risk of  hypopigmentation/hyperpigmentation, scar, recurrence of lesion. The patient is aware of the precancerous quality and need for treatment of these lesions. Liquid nitrogen cryosurgery is applied to the 1 actinic keratoses, as detailed in the physical exam, to produce a freeze injury. The patient is aware that blisters may form and is instructed on wound care with gentle cleansing and use of vaseline ointment to keep moist until healed. The patient is supplied a handout on cryosurgery and is instructed to call if lesions do not completely resolve.    Prurigo nodularis  Counseled to avoid picking    Intralesional Kenalog 10mg/cc (0.4 cc total) injected into 4 lesions on the arms today after obtaining verbal consent including risk of surrounding hypopigmentation. Patient tolerated procedure well.    Units: 1  NDC for Kenalog 10mg/cc:  1557-9768-59    -     triamcinolone acetonide injection 10 mg  -     MD [id number] ACETONIDE INJ PER 10MG  -     MD INJECTION INTO SKIN LESIONS, UP TO 7    Seborrheic keratoses  Epidermal inclusion cyst  Multiple benign nevi  Cherry angioma  Lentigo  Reassurance given to patient. No treatment is necessary.   Treatment of benign, asymptomatic lesions may be considered cosmetic.    Screening exam for skin cancer  Upper body skin examination performed today including at least 6 points as noted in physical examination. Suspicious lesions noted.    Recommend daily sun protection/avoidance and use of at least SPF 30, broad spectrum sunscreen (OTC drug).          Follow up in about 1 year (around 9/19/2024) for UBSE.

## 2023-09-26 LAB
FINAL PATHOLOGIC DIAGNOSIS: NORMAL
Lab: NORMAL

## 2023-10-13 RX ORDER — FLUTICASONE PROPIONATE 50 MCG
2 SPRAY, SUSPENSION (ML) NASAL
Qty: 48 G | Refills: 10 | Status: SHIPPED | OUTPATIENT
Start: 2023-10-13

## 2023-10-14 RX ORDER — METOPROLOL SUCCINATE 25 MG/1
25 TABLET, EXTENDED RELEASE ORAL
Qty: 90 TABLET | Refills: 3 | Status: SHIPPED | OUTPATIENT
Start: 2023-10-14

## 2023-10-17 ENCOUNTER — PATIENT MESSAGE (OUTPATIENT)
Dept: OPTOMETRY | Facility: CLINIC | Age: 83
End: 2023-10-17
Payer: MEDICARE

## 2023-11-08 ENCOUNTER — HOSPITAL ENCOUNTER (OUTPATIENT)
Dept: RADIOLOGY | Facility: HOSPITAL | Age: 83
Discharge: HOME | End: 2023-11-08
Attending: NURSE PRACTITIONER
Payer: MEDICARE

## 2023-11-08 ENCOUNTER — OFFICE VISIT (OUTPATIENT)
Dept: ORTHOPEDICS | Facility: CLINIC | Age: 83
End: 2023-11-08
Payer: MEDICARE

## 2023-11-08 VITALS — HEIGHT: 67 IN | WEIGHT: 187.38 LBS | BODY MASS INDEX: 29.41 KG/M2

## 2023-11-08 DIAGNOSIS — M25.551 RIGHT HIP PAIN: ICD-10-CM

## 2023-11-08 DIAGNOSIS — M25.551 RIGHT HIP PAIN: Primary | ICD-10-CM

## 2023-11-08 DIAGNOSIS — M70.61 TROCHANTERIC BURSITIS OF RIGHT HIP: Primary | ICD-10-CM

## 2023-11-08 PROCEDURE — 20610 PR DRAIN/INJECT LARGE JOINT/BURSA: ICD-10-PCS | Mod: RT,S$GLB,, | Performed by: NURSE PRACTITIONER

## 2023-11-08 PROCEDURE — 20610 DRAIN/INJ JOINT/BURSA W/O US: CPT | Mod: RT,S$GLB,, | Performed by: NURSE PRACTITIONER

## 2023-11-08 PROCEDURE — 3288F PR FALLS RISK ASSESSMENT DOCUMENTED: ICD-10-PCS | Mod: CPTII,S$GLB,, | Performed by: NURSE PRACTITIONER

## 2023-11-08 PROCEDURE — 73502 X-RAY EXAM HIP UNI 2-3 VIEWS: CPT | Mod: TC,RT

## 2023-11-08 PROCEDURE — 1101F PR PT FALLS ASSESS DOC 0-1 FALLS W/OUT INJ PAST YR: ICD-10-PCS | Mod: CPTII,S$GLB,, | Performed by: NURSE PRACTITIONER

## 2023-11-08 PROCEDURE — 3288F FALL RISK ASSESSMENT DOCD: CPT | Mod: CPTII,S$GLB,, | Performed by: NURSE PRACTITIONER

## 2023-11-08 PROCEDURE — 99999 PR PBB SHADOW E&M-EST. PATIENT-LVL III: ICD-10-PCS | Mod: PBBFAC,,, | Performed by: NURSE PRACTITIONER

## 2023-11-08 PROCEDURE — 73502 X-RAY EXAM HIP UNI 2-3 VIEWS: CPT | Mod: 26,RT,, | Performed by: RADIOLOGY

## 2023-11-08 PROCEDURE — 1159F MED LIST DOCD IN RCRD: CPT | Mod: CPTII,S$GLB,, | Performed by: NURSE PRACTITIONER

## 2023-11-08 PROCEDURE — 1101F PT FALLS ASSESS-DOCD LE1/YR: CPT | Mod: CPTII,S$GLB,, | Performed by: NURSE PRACTITIONER

## 2023-11-08 PROCEDURE — 1160F PR REVIEW ALL MEDS BY PRESCRIBER/CLIN PHARMACIST DOCUMENTED: ICD-10-PCS | Mod: CPTII,S$GLB,, | Performed by: NURSE PRACTITIONER

## 2023-11-08 PROCEDURE — 99213 OFFICE O/P EST LOW 20 MIN: CPT | Mod: 25,S$GLB,, | Performed by: NURSE PRACTITIONER

## 2023-11-08 PROCEDURE — 1160F RVW MEDS BY RX/DR IN RCRD: CPT | Mod: CPTII,S$GLB,, | Performed by: NURSE PRACTITIONER

## 2023-11-08 PROCEDURE — 1125F AMNT PAIN NOTED PAIN PRSNT: CPT | Mod: CPTII,S$GLB,, | Performed by: NURSE PRACTITIONER

## 2023-11-08 PROCEDURE — 73502 XR HIP WITH PELVIS WHEN PERFORMED, 2 OR 3  VIEWS RIGHT: ICD-10-PCS | Mod: 26,RT,, | Performed by: RADIOLOGY

## 2023-11-08 PROCEDURE — 1125F PR PAIN SEVERITY QUANTIFIED, PAIN PRESENT: ICD-10-PCS | Mod: CPTII,S$GLB,, | Performed by: NURSE PRACTITIONER

## 2023-11-08 PROCEDURE — 99999 PR PBB SHADOW E&M-EST. PATIENT-LVL III: CPT | Mod: PBBFAC,,, | Performed by: NURSE PRACTITIONER

## 2023-11-08 PROCEDURE — 99213 PR OFFICE/OUTPT VISIT, EST, LEVL III, 20-29 MIN: ICD-10-PCS | Mod: 25,S$GLB,, | Performed by: NURSE PRACTITIONER

## 2023-11-08 PROCEDURE — 1159F PR MEDICATION LIST DOCUMENTED IN MEDICAL RECORD: ICD-10-PCS | Mod: CPTII,S$GLB,, | Performed by: NURSE PRACTITIONER

## 2023-11-08 RX ORDER — TRIAMCINOLONE ACETONIDE 40 MG/ML
40 INJECTION, SUSPENSION INTRA-ARTICULAR; INTRAMUSCULAR
Status: COMPLETED | OUTPATIENT
Start: 2023-11-08 | End: 2023-11-08

## 2023-11-08 RX ORDER — TRIAMCINOLONE ACETONIDE 40 MG/ML
40 INJECTION, SUSPENSION INTRA-ARTICULAR; INTRAMUSCULAR
Status: DISCONTINUED | OUTPATIENT
Start: 2023-11-08 | End: 2023-11-08

## 2023-11-08 RX ADMIN — TRIAMCINOLONE ACETONIDE 40 MG: 40 INJECTION, SUSPENSION INTRA-ARTICULAR; INTRAMUSCULAR at 04:11

## 2023-11-08 NOTE — PROGRESS NOTES
CC: Pain of the Right Hip      HPI: Pt with c/o right hip pain for the past few weeks. The pain is aching and located over the lateral hip. He can press on the exact spot. There is not radiation. There is not groin pain. He has a history of right hip replacement in the past. He is very active and works in his garden everyday. He has some right knee pain, but he had a difficult time after his left knee replacement and his wife would prefer to avoid another surgery if possible. The right knee isn't hurting him today. He is ambulating without assistive device. There is not a limp.    ROS  General: denies fever and chills  Resp: no c/o sob  CVS: no c/o cp  MSK: c/o right hip pain    PE  General: AAOx3, pleasant and cooperative  Resp: respirations even and unlabored  MSK: right hip exam  - [facility]  - straight leg raise  - pain with internal rotation  - pain with external rotation  + pain over the greater trochanter    Xray:  Reviewed by me with the patient and his wife: Right hip arthroplasty identified.  The position and alignment is unchanged as compared to the previous study.  Postsurgical changes noted in the pelvis.  Mild DJD.  No acute fracture or bone destruction identified       Assessment:  Trochanteric bursitis, right hip    Plan:  Cortisone injection right greater trochanter today  RICE  Nsaids prn  F/u as needed    Greater Trochanter Bursa Injection Procedure Note   Diagnosis: right greater trochanteric bursitis  Indications: right hip pain  Procedure Details: Verbal consent was obtained for the procedure. The injection site was identified and the skin was prepared with alcohol. The right hip was injected from a lateral approach with 1 ml of Kenalog and 1 ml Lidocaine under sterile technique using a 25 gauge 1 1/2 inch needle. The needle was removed and the area cleansed and dressed.  Complications:  Patient tolerated the procedure well.    he was advised to rest the leg today, using ice and Tylenol as  needed for comfort and swelling. he may have an increase in discomfort tonight followed by steady improvement over the next several days. It may take 1-3 weeks following the injection to get the full benefit of the medication.

## 2023-11-20 ENCOUNTER — OFFICE VISIT (OUTPATIENT)
Dept: UROLOGY | Facility: CLINIC | Age: 83
End: 2023-11-20
Payer: MEDICARE

## 2023-11-20 VITALS
WEIGHT: 195.19 LBS | HEART RATE: 60 BPM | DIASTOLIC BLOOD PRESSURE: 77 MMHG | BODY MASS INDEX: 30.64 KG/M2 | HEIGHT: 67 IN | SYSTOLIC BLOOD PRESSURE: 127 MMHG

## 2023-11-20 DIAGNOSIS — R10.9 FLANK PAIN, ACUTE: Primary | ICD-10-CM

## 2023-11-20 DIAGNOSIS — R10.30 LOWER ABDOMINAL PAIN: ICD-10-CM

## 2023-11-20 LAB
AMORPH CRY UR QL COMP ASSIST: NORMAL
BACTERIA #/AREA URNS AUTO: NORMAL /HPF
BILIRUB SERPL-MCNC: ABNORMAL MG/DL
BLOOD URINE, POC: ABNORMAL
CLARITY, POC UA: CLEAR
COLOR, POC UA: ABNORMAL
GLUCOSE UR QL STRIP: ABNORMAL
KETONES UR QL STRIP: ABNORMAL
LEUKOCYTE ESTERASE URINE, POC: ABNORMAL
MICROSCOPIC COMMENT: NORMAL
NITRITE, POC UA: ABNORMAL
PH, POC UA: 5
PROTEIN, POC: ABNORMAL
SPECIFIC GRAVITY, POC UA: 1.01
UROBILINOGEN, POC UA: ABNORMAL
WBC #/AREA URNS AUTO: 1 /HPF (ref 0–5)

## 2023-11-20 PROCEDURE — 1101F PR PT FALLS ASSESS DOC 0-1 FALLS W/OUT INJ PAST YR: ICD-10-PCS | Mod: HCNC,CPTII,S$GLB, | Performed by: UROLOGY

## 2023-11-20 PROCEDURE — 1125F PR PAIN SEVERITY QUANTIFIED, PAIN PRESENT: ICD-10-PCS | Mod: HCNC,CPTII,S$GLB, | Performed by: UROLOGY

## 2023-11-20 PROCEDURE — 3288F FALL RISK ASSESSMENT DOCD: CPT | Mod: HCNC,CPTII,S$GLB, | Performed by: UROLOGY

## 2023-11-20 PROCEDURE — 81002 POCT URINE DIPSTICK WITHOUT MICROSCOPE: ICD-10-PCS | Mod: HCNC,S$GLB,, | Performed by: UROLOGY

## 2023-11-20 PROCEDURE — 3074F PR MOST RECENT SYSTOLIC BLOOD PRESSURE < 130 MM HG: ICD-10-PCS | Mod: HCNC,CPTII,S$GLB, | Performed by: UROLOGY

## 2023-11-20 PROCEDURE — 3078F PR MOST RECENT DIASTOLIC BLOOD PRESSURE < 80 MM HG: ICD-10-PCS | Mod: HCNC,CPTII,S$GLB, | Performed by: UROLOGY

## 2023-11-20 PROCEDURE — 3078F DIAST BP <80 MM HG: CPT | Mod: HCNC,CPTII,S$GLB, | Performed by: UROLOGY

## 2023-11-20 PROCEDURE — 81001 URINALYSIS AUTO W/SCOPE: CPT | Mod: HCNC | Performed by: UROLOGY

## 2023-11-20 PROCEDURE — 1125F AMNT PAIN NOTED PAIN PRSNT: CPT | Mod: HCNC,CPTII,S$GLB, | Performed by: UROLOGY

## 2023-11-20 PROCEDURE — 81002 URINALYSIS NONAUTO W/O SCOPE: CPT | Mod: HCNC,S$GLB,, | Performed by: UROLOGY

## 2023-11-20 PROCEDURE — 99204 OFFICE O/P NEW MOD 45 MIN: CPT | Mod: HCNC,S$GLB,, | Performed by: UROLOGY

## 2023-11-20 PROCEDURE — 1101F PT FALLS ASSESS-DOCD LE1/YR: CPT | Mod: HCNC,CPTII,S$GLB, | Performed by: UROLOGY

## 2023-11-20 PROCEDURE — 3074F SYST BP LT 130 MM HG: CPT | Mod: HCNC,CPTII,S$GLB, | Performed by: UROLOGY

## 2023-11-20 PROCEDURE — 1159F PR MEDICATION LIST DOCUMENTED IN MEDICAL RECORD: ICD-10-PCS | Mod: HCNC,CPTII,S$GLB, | Performed by: UROLOGY

## 2023-11-20 PROCEDURE — 99999 PR PBB SHADOW E&M-EST. PATIENT-LVL III: ICD-10-PCS | Mod: PBBFAC,,, | Performed by: UROLOGY

## 2023-11-20 PROCEDURE — 3288F PR FALLS RISK ASSESSMENT DOCUMENTED: ICD-10-PCS | Mod: HCNC,CPTII,S$GLB, | Performed by: UROLOGY

## 2023-11-20 PROCEDURE — 99204 PR OFFICE/OUTPT VISIT, NEW, LEVL IV, 45-59 MIN: ICD-10-PCS | Mod: HCNC,S$GLB,, | Performed by: UROLOGY

## 2023-11-20 PROCEDURE — 99999 PR PBB SHADOW E&M-EST. PATIENT-LVL III: CPT | Mod: PBBFAC,,, | Performed by: UROLOGY

## 2023-11-20 PROCEDURE — 87086 URINE CULTURE/COLONY COUNT: CPT | Mod: HCNC | Performed by: UROLOGY

## 2023-11-20 PROCEDURE — 1159F MED LIST DOCD IN RCRD: CPT | Mod: HCNC,CPTII,S$GLB, | Performed by: UROLOGY

## 2023-11-20 NOTE — PROGRESS NOTES
Subjective:       Patient ID: Herber Camarillo is a 83 y.o. male.    Chief Complaint    Lower back pain     This is a 83 y.o.  male patient that is new to me.  The patient was self referred for lower back pain. Patient complains of right flank pain without radiation to the abdomen, groin, and testicles. Onset of symptoms was abrupt starting 4 days ago with unchanged course since that time. Patient describes the pain as sharp, intermittent, occurring about every   day and lasting 3 minutes per episode and rated as moderate, 5/10 pain. The patient has had nausea and vomiting and no diaphoresis. There has been no fever or chills. The patient is not complaining of dysuria, hematuria or frequency. Risk factors for urolithiasis: history of stone disease. Reports history of uric acid stones, took allopurinol in the past but stopped taking it 6-7 years ago. Reports taking tylenol for the pain with some relief. Reports increasing water intake since symptoms started.         LAST PSA  Lab Results   Component Value Date    PSA 0.04 05/18/2023    PSA 0.03 03/31/2021    PSA 0.04 01/13/2020    PSA 0.03 01/08/2019    PSA 0.03 12/04/2017    PSA 0.04 12/16/2016    PSA 0.03 10/13/2015    PSA 0.03 09/30/2014    PSA 0.02 08/27/2013    PSA 0.03 11/05/2012    PSA 0.03 10/05/2011    PSA 0.02 09/09/2010    PSA 0.02 04/12/2010    PSA 0.04 08/25/2009    PSA 0.02 10/20/2008    PSA <0.1 01/09/2008    PSA <0.1 06/19/2007    PSA <0.1 11/08/2006    PSA <0.1 01/11/2006    PSA <0.1 07/07/2005       Lab Results   Component Value Date    CREATININE 0.93 05/12/2023       ---  PMH/PSH/Medications/Allergies/Social history reviewed and as in chart.    Review of Systems   Constitutional:  Negative for activity change, chills and fever.   Respiratory:  Negative for shortness of breath.    Cardiovascular:  Negative for chest pain and palpitations.   Gastrointestinal:  Positive for nausea and vomiting. Negative for abdominal pain and constipation.    Genitourinary:  Positive for flank pain (right side). Negative for difficulty urinating, dysuria, frequency, hematuria and urgency.   Neurological:  Negative for dizziness and light-headedness.       Objective:      Physical Exam  HENT:      Head: Normocephalic.   Pulmonary:      Effort: Pulmonary effort is normal.   Abdominal:      Tenderness: There is right CVA tenderness.   Musculoskeletal:         General: Normal range of motion.      Cervical back: Normal range of motion.   Skin:     General: Skin is warm and dry.   Neurological:      Mental Status: He is alert and oriented to person, place, and time.       Urine dipstick- negative, trace hematuria  Assessment:     Problem Noted   Lower Abdominal Pain 11/20/2023   Flank Pain, Acute 11/20/2023       Plan:     Ordered UA and culture of urine.  Ordered non contrast CT  Instructed to increase fluid intake, 2L of water per day.  Follow-up after CT results.    DAVID Howell    The above referenced imaging and interpretations were personally reviewed.  Disclaimer: This note has been generated using voice-recognition software. There may be typographical errors that have been missed during proof-reading.

## 2023-11-22 LAB — BACTERIA UR CULT: NO GROWTH

## 2023-11-25 NOTE — ADDENDUM NOTE
11/25/2023  Herson Araiza is a 42 y.o., male.      Patient Active Problem List   Diagnosis    Hypokalemia       No past surgical history on file.     Tobacco Use:  The patient  has no history on file for tobacco use.     Results for orders placed or performed during the hospital encounter of 11/23/23   EKG 12-lead    Collection Time: 11/23/23  9:48 PM    Narrative    Test Reason : E87.6,    Vent. Rate : 095 BPM     Atrial Rate : 095 BPM     P-R Int : 126 ms          QRS Dur : 070 ms      QT Int : 558 ms       P-R-T Axes : 050 068 066 degrees     QTc Int : 701 ms    Normal sinus rhythm  Low voltage QRS  Nonspecific T wave abnormality  Abnormal ECG  No previous ECGs available    Referred By: [email or web address]   SELF           Confirmed By:         Imaging Results              US Abdomen Limited (Final result)  Result time 11/23/23 23:56:48      Final result by Dolly Sol MD (11/23/23 23:56:48)                   Narrative:    EXAM:  US Abdomen Limited, Right Upper Quadrant    CLINICAL HISTORY:  The patient is 42 years old and is Male; elevated LFTs    TECHNIQUE:  Real-time ultrasound of the right upper quadrant with image documentation.    COMPARISON:  No relevant prior studies available.    FINDINGS:  Liver:  Diffuse increased echogenicity of the liver except at the gallbladder fossa. No focal liver mass.  No intrahepatic bile duct dilation.  Gallbladder:  Gallbladder containing sludge. No gallbladder wall thickening or pericholecystic fluid. Nontender to palpation.  Common bile duct:  No stones.  No dilation.  Pancreas: Obscured by bowel gas.  Right kidney:  No right hydronephrosis. 11 cm in length.  No stones.    IMPRESSION:  1.  Fatty liver.  2.  Gallbladder sludge.    Electronically signed by:  Dolly Curtis MD  11/23/2023 11:56 PM [facility] Workstation: [id number]                                     Lab  Addendum  created 06/24/22 1355 by Shankar Christian RN    Clinical Note Signed       Results   Component Value Date    WBC 4.87 11/25/2023    HGB 8.6 (L) 11/25/2023    HCT 25.4 (L) 11/25/2023     (H) 11/25/2023    PLT 82 (L) 11/25/2023     BMP  Lab Results   Component Value Date     (L) 11/25/2023    K 3.4 (L) 11/25/2023     11/25/2023    CO2 23 11/25/2023    BUN 2 (L) 11/25/2023    CREATININE 0.4 (L) 11/25/2023    CALCIUM 6.7 (LL) 11/25/2023    ANIONGAP 5 (L) 11/25/2023    GLU 79 11/25/2023    GLU 90 11/24/2023     (H) 11/23/2023       No results found for this or any previous visit.         Pre-op Assessment    I have reviewed the Patient Summary Reports.     I have reviewed the Nursing Notes. I have reviewed the NPO Status.   I have reviewed the Medications.     Review of Systems  Anesthesia Hx:  No problems with previous Anesthesia             Denies Family Hx of Anesthesia complications.    Denies Personal Hx of Anesthesia complications.                    Social:  Smoker, Alcohol Use       Hematology/Oncology:    Oncology Normal    -- Anemia:       --  Thrombocytopenia:            Hematology Comments: 5th Vodka Daily                     EENT/Dental:  EENT/Dental Normal           Cardiovascular:  Cardiovascular Normal                  ECG has been reviewed.                          Pulmonary:  Pulmonary Normal                       Renal/:  Renal/ Normal                 Hepatic/GI:     GERD, well controlled Liver Disease,  Nausea & Vomiting on admit    No N/V at this time        Liver Disease, Abnormal Liver Enzymes  , Cirrhosis      Musculoskeletal:  Arthritis      Joint Disease:  Arthritis, Osteoarthritis     Spine Disorders: cervical Chronic Pain           Neurological:  Neurology Normal              Osteoarthritis                           Endocrine:  Endocrine Normal            Dermatological:  Skin Normal    Psych:  Psychiatric Normal                    Physical Exam  General: Well nourished, Cooperative, Alert and Oriented    Airway:  Mallampati: III   Mouth  Opening: Small, but > 3cm  TM Distance: 4 - 6 cm  Tongue: Normal  Neck ROM: Extension Decreased    Dental:  Intact    Chest/Lungs:  Clear to auscultation, Normal Respiratory Rate    Heart:  Rate: Normal  Rhythm: Regular Rhythm        Anesthesia Plan  Type of Anesthesia, risks & benefits discussed:    Anesthesia Type: Gen Natural Airway  Intra-op Monitoring Plan: Standard ASA Monitors  Post Op Pain Control Plan: multimodal analgesia and IV/PO Opioids PRN  Airway Plan: Direct and Video, Post-Induction  Informed Consent: Informed consent signed with the Patient and all parties understand the risks and agree with anesthesia plan.  All questions answered.   ASA Score: 3  Anesthesia Plan Notes:       GNA  POM  Propofol     Ready For Surgery From Anesthesia Perspective.     .

## 2024-09-27 NOTE — TELEPHONE ENCOUNTER
----- Message from Wendi Griffin sent at 8/15/2022 10:07 AM CDT -----  Regarding: appt  Contact: pt@ [phone]  Pt calling to schedule an appt with Gastro, was admitted to the hospital in July for diarrhea and small bowel obstruction. Says he had no follow up appt, but he is having the same symptoms. Diarrhea, nausea and vomiting, next available appt in epic 9/27, patient states he can't wait that long to be seen. Please call.    
Spoke with patient. Appointment scheduled  
Oriented - self; Oriented - place; Oriented - time

## 2025-07-22 NOTE — SUBJECTIVE & OBJECTIVE
Interval History: NAEON. Afebrile. HDS. Felt great this AM, planned on discharging home as he tolerated diet and had multiple Bms. Then had n/v after breakfast.       Medications:  Continuous Infusions:   lactated ringers 75 mL/hr at 07/05/22 1621     Scheduled Meds:   enoxaparin  40 mg Subcutaneous Daily    metoprolol  5 mg Intravenous Q12H    ondansetron  4 mg Intravenous Once     PRN Meds:acetaminophen, HYDROmorphone, HYDROmorphone, ondansetron, promethazine, sodium chloride 0.9%     Review of patient's allergies indicates:   Allergen Reactions    Novocain [procaine] Shortness Of Breath           Objective:     Vital Signs (Most Recent):  Temp: 97.4 °F (36.3 °C) (07/06/22 1212)  Pulse: 88 (07/06/22 1212)  Resp: 16 (07/06/22 1212)  BP: (!) 151/68 (07/06/22 1212)  SpO2: 97 % (07/06/22 1212)   Vital Signs (24h Range):  Temp:  [96 °F (35.6 °C)-98.5 °F (36.9 °C)] 97.4 °F (36.3 °C)  Pulse:  [78-93] 88  Resp:  [16-18] 16  SpO2:  [92 %-97 %] 97 %  BP: (148-162)/(68-70) 151/68     Weight: 86.2 kg (190 lb)  Body mass index is 29.75 kg/m².    Intake/Output - Last 3 Shifts         07/04 0700 07/05 0659 07/05 0700 07/06 0659 07/06 0700  07/07 0659    P.O.   350    IV Piggyback       Total Intake(mL/kg)   350 (4.1)    Urine (mL/kg/hr) 650 (0.3)      Drains 1650 300     Total Output 2300 300     Net -2300 -300 +350           Urine Occurrence 1 x  1 x    Stool Occurrence   1 x    Emesis Occurrence   2 x            Physical Exam  Vitals and nursing note reviewed.   Constitutional:       General: He is not in acute distress.     Comments: Room air   HENT:      Head: Normocephalic and atraumatic.      Nose: Nose normal.      Mouth/Throat:      Mouth: Mucous membranes are moist.      Pharynx: Oropharynx is clear.   Eyes:      Extraocular Movements: Extraocular movements intact.      Conjunctiva/sclera: Conjunctivae normal.   Cardiovascular:      Rate and Rhythm: Normal rate.   Pulmonary:      Effort: Pulmonary effort is normal. No  Nithin Pino is a 86 y.o. 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), Chronic diastolic heart failure (10/31/2013), Chronic ischemic heart disease (10/31/2013), CKD (chronic kidney disease) stage 3, GFR 30-59 ml/min (11/04/2015), Coronary artery disease, DM (diabetes mellitus) (2013), DM (diabetes mellitus) (2011), Heart valve regurgitation (11/04/2015), Hematuria (06/25/2020), History of alcohol abuse (01/22/2018), History of atherectomy (01/21/2018), History of chronic kidney disease (01/22/2018), History of PTCA (10/31/2013), History of PTCA (03/09/2016), Hyperlipidemia, Hypertension, Insomnia (06/17/2013), Iron deficiency anemia (10/19/2023), Ischemic cardiomyopathy (10/31/2013), Long term (current) use of antithrombotics/antiplatelets (01/21/2018), Malignant neoplasm of overlapping sites of bladder (06/08/2023), Myocardial infarction, Old MI (myocardial infarction) (1994), Peripheral vascular disease, Polyneuropathy, RBBB (10/31/2013), S/P CABG (coronary artery bypass graft) (10/31/2013), Shortness of breath (10/31/2013), Simple chronic bronchitis (6/17/2013), Tobacco dependence, Trouble in sleeping, Type 2 diabetes with peripheral circulatory disorder, controlled, and Wears glasses. who presented to the ED for further evaluation of acute onset chest pain which began earlier today while driving to the store.  Patient has significant cardiac history including CAD s/p stent placement and CABG currently followed by Dr. Sadler from cardiology outpatient.  Patient described endorsing substernal chest pressure which began acutely while driving to the store around 2:00 p.m. Pain was somewhat relieved by taking two sublingual  nitros and completely resolved upon administration of nitro spray upon EMS arrival.  He reported no known alleviating or aggravating factors noted in his currently chest pain-free at time of bedside assessment.  Patient also reports endorsing nonproductive cough with daughter reported to ED staff that he has been experiencing shortness a breath and congestion after spring his home with ortho insect killer back on 07/18/25 and has been feeling ill since.  Patient denied endorsing any lightheadedness, dizziness, headache, visual changes, fever, chills, sweats, nausea, vomiting, abdominal pain, dysuria, hematuria, melena, hematochezia, diarrhea, or onset neurological deficits.  Prior to onset of symptoms, patient reported being in his usual state of health with no other concerns or complaints.  Initial workup in the ED revealed patient to be afebrile without leukocytosis, hemodynamically stable, creatinine/GFR 2.1/30, blood glucose 273, , troponin 0.101.  Chest x-ray negative for acute findings.  EKG reveals sinus rhythm with occasional PVCs, T-wave abnormalities, in age indeterminate infarcts.  Troponin initially elevated at 0.109 with repeat 0.101 and 0.108.  Cardiology consulted by ED staff and recommended patient be initiated on heparin and NSTEMI pathway inpatient will be evaluated in the morning regarding further ischemic workup.  Patient admitted to Hospital Medicine inpatient for continued medical management.    PCP: MUKUL Garg     respiratory distress.   Abdominal:      Palpations: Abdomen is soft.      Tenderness: There is no guarding or rebound.      Comments: Soft, non-tender. Less distension than day prior. No peritonitic signs   Musculoskeletal:         General: No deformity.   Skin:     General: Skin is warm and dry.   Neurological:      General: No focal deficit present.      Mental Status: He is alert.       Significant Labs:  I have reviewed all pertinent lab results within the past 24 hours.  CBC:   Recent Labs   Lab 07/06/22  0446   WBC 5.21   RBC 4.21*   HGB 13.1*   HCT 38.3*      MCV 91   MCH 31.1*   MCHC 34.2       CMP:   Recent Labs   Lab 07/06/22  0446   GLU 89   CALCIUM 8.1*   ALBUMIN 2.7*   PROT 5.1*      K 3.2*   CO2 31*   CL 95   BUN 13   CREATININE 0.7   ALKPHOS 79   ALT 31   AST 37   BILITOT 1.4*         Significant Diagnostics:  I have reviewed all pertinent imaging results/findings within the past 24 hours.

## (undated) DEVICE — GOWN SURGICAL XX LARGE X LONG

## (undated) DEVICE — KIT TOTAL KNEE TKOFG

## (undated) DEVICE — PUMP COLD THERAPY

## (undated) DEVICE — GLASSES EYE PROTECTIVE

## (undated) DEVICE — ELECTRODE REM PLYHSV RETURN 9

## (undated) DEVICE — SUT 1 36IN COATED VICRYL UN

## (undated) DEVICE — SOL BETADINE 5%

## (undated) DEVICE — WRAP KNEE ACCU THERM GEL PACK

## (undated) DEVICE — GLOVE BIOGEL SKINSENSE PI 7.5

## (undated) DEVICE — MASK FLYTE HOOD PEEL AWAY

## (undated) DEVICE — ELECTRODE BLD 1 INCH TEFLON

## (undated) DEVICE — SUT 2/0 36IN COATED VICRYL

## (undated) DEVICE — ADHESIVE DERMABOND ADVANCED

## (undated) DEVICE — BANDAGE ESMARK 6X12

## (undated) DEVICE — Device

## (undated) DEVICE — TOWEL OR XRAY WHITE 17X26IN

## (undated) DEVICE — GLOVE BIOGEL SKINSENSE PI 6.5

## (undated) DEVICE — WRAP PROTECTIVE LEG POS STRL

## (undated) DEVICE — DRESSING AQUACEL AG ADV 3.5X12

## (undated) DEVICE — CASSETTE INFINITI

## (undated) DEVICE — CONTAINER SPECIMEN OR STER 4OZ

## (undated) DEVICE — GLOVE BIOGEL SKINSENSE PI 8.0

## (undated) DEVICE — GAUZE SPONGE 4X4 12PLY

## (undated) DEVICE — SEE MEDLINE ITEM 157144

## (undated) DEVICE — SUT MONOCRYL PLUS UD 3-0 27

## (undated) DEVICE — PULSAVAC ZIMMER

## (undated) DEVICE — SUT VICRYL PLUS 0 CT1 36IN

## (undated) DEVICE — BLADE RECIP RIBBED

## (undated) DEVICE — GOWN SMARTGOWN 3XL XLONG

## (undated) DEVICE — PAD CAST SPECIALIST STRL 6

## (undated) DEVICE — SEE MEDLINE ITEM 146298

## (undated) DEVICE — SEE MEDLINE ITEM 157131

## (undated) DEVICE — UNDERGLOVES BIOGEL PI SIZE 8

## (undated) DEVICE — BANDAGE MATRIX HK LOOP 4IN 5YD

## (undated) DEVICE — SYR SLIP TIP 1CC

## (undated) DEVICE — DRAPE INCISE IOBAN 2 23X33IN

## (undated) DEVICE — BLADE SAGITTAL 18 X 1.27 X 90M

## (undated) DEVICE — TOURNIQUET SB QC DP 34X4IN

## (undated) DEVICE — MARKER SKIN STND TIP BLUE BARR

## (undated) DEVICE — BANDAGE MATRIX HK LOOP 6IN 5YD

## (undated) DEVICE — MIXER BONE CEMENT

## (undated) DEVICE — TUBE SUCTION KAMVAC MINI 20/BX

## (undated) DEVICE — PAD ABD 8X10 STERILE

## (undated) DEVICE — SOL IRR NACL .9% 3000ML

## (undated) DEVICE — ALCOHOL 70% ISOP RUBBING 4OZ